# Patient Record
Sex: FEMALE | Race: WHITE | NOT HISPANIC OR LATINO | Employment: OTHER | URBAN - METROPOLITAN AREA
[De-identification: names, ages, dates, MRNs, and addresses within clinical notes are randomized per-mention and may not be internally consistent; named-entity substitution may affect disease eponyms.]

---

## 2017-01-10 ENCOUNTER — CONVERSION ENCOUNTER (OUTPATIENT)
Dept: FAMILY MEDICINE CLINIC | Facility: CLINIC | Age: 67
End: 2017-01-10

## 2017-01-10 LAB
ALBUMIN SERPL-MCNC: 4.1 G/DL (ref 3.6–5.1)
ALBUMIN/GLOB SERPL: ABNORMAL {RATIO} (ref 1–2.5)
ALP SERPL-CCNC: 93 UNITS/L (ref 33–130)
ALT SERPL-CCNC: 21 UNITS/L (ref 6–29)
AST SERPL-CCNC: 23 UNITS/L (ref 10–35)
BASOPHILS # BLD AUTO: ABNORMAL 10*3/MM3 (ref 0–200)
BASOPHILS NFR BLD AUTO: 0.5 %
BILIRUB SERPL-MCNC: 0.5 MG/DL (ref 0.2–1.2)
BUN SERPL-MCNC: 18 MG/DL (ref 7–25)
BUN/CREAT SERPL: ABNORMAL (ref 6–22)
CALCIUM SERPL-MCNC: 10.3 MG/DL (ref 8.6–10.4)
CHLORIDE SERPL-SCNC: 102 MMOL/L (ref 98–110)
CHOLEST SERPL-MCNC: 209 MG/DL (ref 125–200)
CHOLEST/HDLC SERPL: ABNORMAL {RATIO}
CO2 CONTENT VENOUS: 32 MMOL/L (ref 20–31)
CONV NEUTROPHILS/100 LEUKOCYTES IN BODY FLUID BY MANUAL COUNT: 67 %
CONV TOTAL PROTEIN: 7.3 G/DL (ref 6.1–8.1)
CREAT UR-MCNC: 0.72 MG/DL (ref 0.5–0.99)
EOSINOPHIL # BLD AUTO: 3 %
EOSINOPHIL # BLD AUTO: ABNORMAL 10*3/MM3 (ref 15–500)
ERYTHROCYTE [DISTWIDTH] IN BLOOD BY AUTOMATED COUNT: 13.6 % (ref 11–15)
GLOBULIN UR ELPH-MCNC: ABNORMAL G/DL (ref 1.9–3.7)
GLUCOSE SERPL-MCNC: 95 MG/DL (ref 65–99)
HCT VFR BLD AUTO: 43.2 % (ref 35–45)
HDLC SERPL-MCNC: 46 MG/DL
HGB BLD-MCNC: 14.4 G/DL (ref 11.7–15.5)
LDLC SERPL CALC-MCNC: ABNORMAL MG/DL
LYMPHOCYTES # BLD AUTO: ABNORMAL 10*3/MM3 (ref 850–3900)
LYMPHOCYTES NFR BLD AUTO: 24.8 %
MCH RBC QN AUTO: 31 PG (ref 27–33)
MCHC RBC AUTO-ENTMCNC: ABNORMAL % (ref 32–36)
MCV RBC AUTO: 93.3 FL (ref 80–100)
MONOCYTES # BLD AUTO: ABNORMAL 10*3/MICROLITER (ref 200–950)
MONOCYTES NFR BLD AUTO: 4.7 %
NEUTROPHILS # BLD AUTO: ABNORMAL 10*3/MM3 (ref 1500–7800)
PLATELET # BLD AUTO: ABNORMAL 10*3/MM3 (ref 140–400)
PMV BLD AUTO: 9.3 FL (ref 7.5–11.5)
POTASSIUM SERPL-SCNC: 4.2 MMOL/L (ref 3.5–5.3)
RBC # BLD AUTO: ABNORMAL 10*6/MM3 (ref 3.8–5.1)
SODIUM SERPL-SCNC: 142 MMOL/L (ref 135–146)
TRIGL SERPL-MCNC: 400 MG/DL
TSH SERPL-ACNC: 3.06 MIU/L (ref 0.4–4.5)
WBC # BLD AUTO: ABNORMAL K/UL (ref 3.8–10.8)

## 2017-06-02 LAB
CONV RF TITER: 10
CRP SERPL-MCNC: 0.89 MG/DL
DSDNA AB SER-ACNC: 4 [IU]/ML
ERYTHROCYTE [SEDIMENTATION RATE] IN BLOOD BY WESTERGREN METHOD: 14 MM/HR

## 2017-09-19 LAB
ALBUMIN SERPL-MCNC: 4.4 G/DL (ref 3.6–5.1)
ALBUMIN/GLOB SERPL: ABNORMAL {RATIO} (ref 1–2.5)
ALP SERPL-CCNC: 107 UNITS/L (ref 33–130)
ALT SERPL-CCNC: 21 UNITS/L (ref 6–29)
AST SERPL-CCNC: 26 UNITS/L (ref 10–35)
BASOPHILS # BLD AUTO: ABNORMAL 10*3/MM3 (ref 0–200)
BASOPHILS NFR BLD AUTO: 1.5 %
BILIRUB SERPL-MCNC: 0.8 MG/DL (ref 0.2–1.2)
BILIRUB UR QL STRIP: NEGATIVE
BUN SERPL-MCNC: 19 MG/DL (ref 7–25)
BUN/CREAT SERPL: ABNORMAL (ref 6–22)
C3 SERPL-MCNC: 221 MG/DL (ref 90–180)
C4 SERPL-MCNC: 29 MG/DL (ref 16–47)
CALCIUM SERPL-MCNC: 9.9 MG/DL (ref 8.6–10.4)
CHLORIDE SERPL-SCNC: 103 MMOL/L (ref 98–110)
CO2 CONTENT VENOUS: 32 MMOL/L (ref 20–31)
COLOR UR: ABNORMAL
CONV BACTERIA IN URINE MICRO: ABNORMAL /HPF
CONV HYALINE CASTS IN URINE MICRO: ABNORMAL
CONV NEUTROPHILS/100 LEUKOCYTES IN BODY FLUID BY MANUAL COUNT: 63.8 %
CONV PROTEIN IN URINE BY AUTOMATED TEST STRIP: NEGATIVE
CONV TOTAL PROTEIN: 7.6 G/DL (ref 6.1–8.1)
CREAT UR-MCNC: 0.77 MG/DL (ref 0.5–0.99)
CRP SERPL-MCNC: 1.02 MG/DL
EOSINOPHIL # BLD AUTO: 3.4 %
EOSINOPHIL # BLD AUTO: ABNORMAL 10*3/MM3 (ref 15–500)
ERYTHROCYTE [DISTWIDTH] IN BLOOD BY AUTOMATED COUNT: 13.2 % (ref 11–15)
ERYTHROCYTE [SEDIMENTATION RATE] IN BLOOD BY WESTERGREN METHOD: 6 MM/HR
GLOBULIN UR ELPH-MCNC: ABNORMAL G/DL (ref 1.9–3.7)
GLUCOSE SERPL-MCNC: 97 MG/DL (ref 65–99)
GLUCOSE UR QL: NEGATIVE G/DL
HCT VFR BLD AUTO: 42.2 % (ref 35–45)
HGB BLD-MCNC: 15 G/DL (ref 11.7–15.5)
HGB UR QL STRIP: NEGATIVE
KETONES UR QL STRIP: NEGATIVE
LYMPHOCYTES # BLD AUTO: ABNORMAL 10*3/MM3 (ref 850–3900)
LYMPHOCYTES NFR BLD AUTO: 25.2 %
MCH RBC QN AUTO: 32.5 PG (ref 27–33)
MCHC RBC AUTO-ENTMCNC: ABNORMAL % (ref 32–36)
MCV RBC AUTO: 91.5 FL (ref 80–100)
MONOCYTES # BLD AUTO: ABNORMAL 10*3/MICROLITER (ref 200–950)
MONOCYTES NFR BLD AUTO: 6.1 %
NEUTROPHILS # BLD AUTO: ABNORMAL 10*3/MM3 (ref 1500–7800)
PH UR STRIP.AUTO: 7.5 [PH] (ref 5–8)
PLATELET # BLD AUTO: ABNORMAL 10*3/MM3 (ref 140–400)
PMV BLD AUTO: 10.4 FL (ref 7.5–12.5)
POTASSIUM SERPL-SCNC: 4.3 MMOL/L (ref 3.5–5.3)
RBC # BLD AUTO: ABNORMAL 10*6/MM3 (ref 3.8–5.1)
RBC #/AREA URNS HPF: ABNORMAL /[HPF]
SODIUM SERPL-SCNC: 141 MMOL/L (ref 135–146)
SP GR UR: 1.02 (ref 1–1.03)
SQUAMOUS #/AREA URNS HPF: ABNORMAL /HPF
WBC # BLD AUTO: ABNORMAL K/UL (ref 3.8–10.8)
WBC #/AREA URNS HPF: ABNORMAL CELLS/HPF

## 2017-10-11 ENCOUNTER — HOSPITAL ENCOUNTER (OUTPATIENT)
Dept: RHEUMATOLOGY | Facility: CLINIC | Age: 67
Discharge: HOME | End: 2017-10-11
Attending: INTERNAL MEDICINE | Admitting: INTERNAL MEDICINE

## 2017-11-20 ENCOUNTER — HOSPITAL ENCOUNTER (OUTPATIENT)
Dept: LAB | Facility: HOSPITAL | Age: 67
Discharge: HOME | End: 2017-11-20
Attending: INTERNAL MEDICINE | Admitting: INTERNAL MEDICINE

## 2017-11-20 LAB
ALBUMIN SERPL-MCNC: 3.9 G/DL (ref 3.5–4.8)
ALBUMIN/GLOB SERPL: 1.3 {RATIO} (ref 1–1.7)
ALP SERPL-CCNC: 86 IU/L (ref 32–91)
ALT SERPL-CCNC: 27 IU/L (ref 14–54)
ANION GAP SERPL CALC-SCNC: 10.3 MMOL/L (ref 10–20)
AST SERPL-CCNC: 27 IU/L (ref 15–41)
BASOPHILS # BLD AUTO: 0 10*3/UL (ref 0–0.2)
BASOPHILS NFR BLD AUTO: 1 % (ref 0–2)
BILIRUB SERPL-MCNC: 1.1 MG/DL (ref 0.3–1.2)
BILIRUB UR QL STRIP: NEGATIVE MG/DL
BUN SERPL-MCNC: 16 MG/DL (ref 8–20)
BUN/CREAT SERPL: 22.9 (ref 5.4–26.2)
CALCIUM SERPL-MCNC: 9.2 MG/DL (ref 8.9–10.3)
CASTS URNS QL MICRO: ABNORMAL /[LPF]
CHLORIDE SERPL-SCNC: 102 MMOL/L (ref 101–111)
COLOR UR: YELLOW
CONV BACTERIA IN URINE MICRO: NEGATIVE
CONV CLARITY OF URINE: CLEAR
CONV CO2: 29 MMOL/L (ref 22–32)
CONV HIV-1/ HIV-2: NORMAL
CONV HIV-1/ HIV-2: NORMAL
CONV HYALINE CASTS IN URINE MICRO: 2 /[LPF] (ref 0–5)
CONV PROTEIN IN URINE BY AUTOMATED TEST STRIP: NEGATIVE MG/DL
CONV SMALL ROUND CELLS: ABNORMAL /[HPF]
CONV TOTAL PROTEIN: 6.8 G/DL (ref 6.1–7.9)
CONV UROBILINOGEN IN URINE BY AUTOMATED TEST STRIP: 0.2 MG/DL
CREAT UR-MCNC: 0.7 MG/DL (ref 0.4–1)
CRP SERPL-MCNC: 0.98 MG/DL (ref 0–0.7)
DIFFERENTIAL METHOD BLD: (no result)
EOSINOPHIL # BLD AUTO: 0.2 10*3/UL (ref 0–0.3)
EOSINOPHIL # BLD AUTO: 2 % (ref 0–3)
ERYTHROCYTE [DISTWIDTH] IN BLOOD BY AUTOMATED COUNT: 14.6 % (ref 11.5–14.5)
ERYTHROCYTE [SEDIMENTATION RATE] IN BLOOD BY WESTERGREN METHOD: 21 MM/HR (ref 0–30)
GLOBULIN UR ELPH-MCNC: 2.9 G/DL (ref 2.5–3.8)
GLUCOSE SERPL-MCNC: 103 MG/DL (ref 65–99)
GLUCOSE UR QL: NEGATIVE MG/DL
HAV IGM SERPL QL IA: NONREACTIVE
HBV CORE IGM SERPL QL IA: NONREACTIVE
HBV SURFACE AG SERPL QL IA: NONREACTIVE
HCT VFR BLD AUTO: 41.8 % (ref 35–49)
HCV AB SER DONR QL: NORMAL
HCV AB SER DONR QL: NORMAL
HGB BLD-MCNC: 14.2 G/DL (ref 12–15)
HGB UR QL STRIP: NEGATIVE
KETONES UR QL STRIP: NEGATIVE MG/DL
LEUKOCYTE ESTERASE UR QL STRIP: ABNORMAL
LYMPHOCYTES # BLD AUTO: 1.4 10*3/UL (ref 0.8–4.8)
LYMPHOCYTES NFR BLD AUTO: 17 % (ref 18–42)
MCH RBC QN AUTO: 31.8 PG (ref 26–32)
MCHC RBC AUTO-ENTMCNC: 34 G/DL (ref 32–36)
MCV RBC AUTO: 93.4 FL (ref 80–94)
MONOCYTES # BLD AUTO: 0.6 10*3/UL (ref 0.1–1.3)
MONOCYTES NFR BLD AUTO: 7 % (ref 2–11)
NEUTROPHILS # BLD AUTO: 6.1 10*3/UL (ref 2.3–8.6)
NEUTROPHILS NFR BLD AUTO: 73 % (ref 50–75)
NITRITE UR QL STRIP: NEGATIVE
NRBC BLD AUTO-RTO: 0 /100{WBCS}
NRBC/RBC NFR BLD MANUAL: 0 10*3/UL
PH UR STRIP.AUTO: 6.5 [PH] (ref 4.5–8)
PLATELET # BLD AUTO: 185 10*3/UL (ref 150–450)
PMV BLD AUTO: 7.7 FL (ref 7.4–10.4)
POTASSIUM SERPL-SCNC: 3.3 MMOL/L (ref 3.6–5.1)
PTH-INTACT SERPL-MCNC: 92 PG/ML (ref 11–72)
RBC # BLD AUTO: 4.48 10*6/UL (ref 4–5.4)
RBC #/AREA URNS HPF: 1 /[HPF] (ref 0–3)
SODIUM SERPL-SCNC: 138 MMOL/L (ref 136–144)
SP GR UR: 1.01 (ref 1–1.03)
SPERM URNS QL MICRO: ABNORMAL /[HPF]
SQUAMOUS SPT QL MICRO: 1 /[HPF] (ref 0–5)
UNIDENT CRYS URNS QL MICRO: ABNORMAL /[HPF]
WBC # BLD AUTO: 8.3 10*3/UL (ref 4.5–11.5)
WBC #/AREA URNS HPF: 2 /[HPF] (ref 0–5)
YEAST SPEC QL WET PREP: ABNORMAL /[HPF]

## 2017-11-20 PROCEDURE — 86481 TB AG RESPONSE T-CELL SUSP: CPT

## 2017-11-21 ENCOUNTER — LAB REQUISITION (OUTPATIENT)
Dept: LAB | Facility: HOSPITAL | Age: 67
End: 2017-11-21

## 2017-11-21 DIAGNOSIS — Z00.00 ROUTINE GENERAL MEDICAL EXAMINATION AT A HEALTH CARE FACILITY: ICD-10-CM

## 2017-11-22 LAB
TSPOT INTERPRETATION: NEGATIVE
TSPOT NIL CONTROL: 0
TSPOT PANEL A: 0
TSPOT PANEL B: 0
TSPOT POS CONTROL: 51

## 2017-11-24 LAB — TSPOT INTERPRETATION: NORMAL

## 2017-12-20 LAB
BUN SERPL-MCNC: 12 MG/DL (ref 7–25)
BUN/CREAT SERPL: NORMAL (ref 6–22)
CALCIUM SERPL-MCNC: 9.7 MG/DL (ref 8.6–10.2)
CHLORIDE SERPL-SCNC: 104 MMOL/L (ref 98–110)
CONV CO2: 29 MMOL/L (ref 21–33)
CREAT UR-MCNC: 0.8 MG/DL (ref 0.6–1.18)
GLUCOSE SERPL-MCNC: 92 MG/DL (ref 65–99)
MAGNESIUM SERPL-MCNC: 2.2 MG/DL (ref 1.5–2.5)
POTASSIUM SERPL-SCNC: 3.5 MMOL/L (ref 3.5–5.3)
SODIUM SERPL-SCNC: 141 MMOL/L (ref 135–146)
T4 FREE SERPL-MCNC: 1.2 NG/DL (ref 0.8–1.8)
TSH SERPL-ACNC: 1.23 MICROINTL UNITS/ML (ref 0.4–4.5)

## 2018-01-19 ENCOUNTER — HOSPITAL ENCOUNTER (OUTPATIENT)
Dept: LAB | Facility: HOSPITAL | Age: 68
Discharge: HOME | End: 2018-01-19
Attending: INTERNAL MEDICINE | Admitting: INTERNAL MEDICINE

## 2018-01-19 LAB
ALBUMIN SERPL-MCNC: 4.1 G/DL (ref 3.5–4.8)
ALBUMIN/GLOB SERPL: 1.3 {RATIO} (ref 1–1.7)
ALP SERPL-CCNC: 80 IU/L (ref 32–91)
ALT SERPL-CCNC: 25 IU/L (ref 14–54)
ANION GAP SERPL CALC-SCNC: 15.1 MMOL/L (ref 10–20)
AST SERPL-CCNC: 31 IU/L (ref 15–41)
BASOPHILS # BLD AUTO: 0.1 10*3/UL (ref 0–0.2)
BASOPHILS NFR BLD AUTO: 1 % (ref 0–2)
BILIRUB SERPL-MCNC: 0.7 MG/DL (ref 0.3–1.2)
BILIRUB UR QL STRIP: NEGATIVE MG/DL
BUN SERPL-MCNC: 14 MG/DL (ref 8–20)
BUN/CREAT SERPL: 20 (ref 5.4–26.2)
CALCIUM SERPL-MCNC: 9.6 MG/DL (ref 8.9–10.3)
CASTS URNS QL MICRO: NORMAL /[LPF]
CHLORIDE SERPL-SCNC: 104 MMOL/L (ref 101–111)
COLOR UR: YELLOW
CONV BACTERIA IN URINE MICRO: NEGATIVE
CONV CLARITY OF URINE: NORMAL
CONV CO2: 24 MMOL/L (ref 22–32)
CONV HYALINE CASTS IN URINE MICRO: NORMAL /[LPF] (ref 0–5)
CONV PROTEIN IN URINE BY AUTOMATED TEST STRIP: NEGATIVE MG/DL
CONV SMALL ROUND CELLS: NORMAL /[HPF]
CONV TOTAL PROTEIN: 7.2 G/DL (ref 6.1–7.9)
CONV UROBILINOGEN IN URINE BY AUTOMATED TEST STRIP: 0.2 MG/DL
CREAT UR-MCNC: 0.7 MG/DL (ref 0.4–1)
CRP SERPL-MCNC: 0.62 MG/DL (ref 0–0.7)
CULTURE INDICATED?: NORMAL
DIFFERENTIAL METHOD BLD: (no result)
EOSINOPHIL # BLD AUTO: 0.3 10*3/UL (ref 0–0.3)
EOSINOPHIL # BLD AUTO: 5 % (ref 0–3)
ERYTHROCYTE [DISTWIDTH] IN BLOOD BY AUTOMATED COUNT: 15.2 % (ref 11.5–14.5)
ERYTHROCYTE [SEDIMENTATION RATE] IN BLOOD BY WESTERGREN METHOD: 26 MM/HR (ref 0–30)
GLOBULIN UR ELPH-MCNC: 3.1 G/DL (ref 2.5–3.8)
GLUCOSE SERPL-MCNC: 96 MG/DL (ref 65–99)
GLUCOSE UR QL: NEGATIVE MG/DL
HCT VFR BLD AUTO: 40.2 % (ref 35–49)
HGB BLD-MCNC: 13.8 G/DL (ref 12–15)
HGB UR QL STRIP: NEGATIVE
KETONES UR QL STRIP: NEGATIVE MG/DL
LEUKOCYTE ESTERASE UR QL STRIP: NEGATIVE
LYMPHOCYTES # BLD AUTO: 1.3 10*3/UL (ref 0.8–4.8)
LYMPHOCYTES NFR BLD AUTO: 23 % (ref 18–42)
MCH RBC QN AUTO: 32.7 PG (ref 26–32)
MCHC RBC AUTO-ENTMCNC: 34.3 G/DL (ref 32–36)
MCV RBC AUTO: 95.4 FL (ref 80–94)
MONOCYTES # BLD AUTO: 0.3 10*3/UL (ref 0.1–1.3)
MONOCYTES NFR BLD AUTO: 5 % (ref 2–11)
NEUTROPHILS # BLD AUTO: 3.9 10*3/UL (ref 2.3–8.6)
NEUTROPHILS NFR BLD AUTO: 66 % (ref 50–75)
NITRITE UR QL STRIP: NEGATIVE
NRBC BLD AUTO-RTO: 0 /100{WBCS}
NRBC/RBC NFR BLD MANUAL: 0 10*3/UL
PH UR STRIP.AUTO: 6 [PH] (ref 4.5–8)
PLATELET # BLD AUTO: 220 10*3/UL (ref 150–450)
PMV BLD AUTO: 8.4 FL (ref 7.4–10.4)
POTASSIUM SERPL-SCNC: 4.1 MMOL/L (ref 3.6–5.1)
RBC # BLD AUTO: 4.22 10*6/UL (ref 4–5.4)
RBC #/AREA URNS HPF: 1 /[HPF] (ref 0–3)
SODIUM SERPL-SCNC: 139 MMOL/L (ref 136–144)
SP GR UR: 1.02 (ref 1–1.03)
SPERM URNS QL MICRO: NORMAL /[HPF]
SQUAMOUS SPT QL MICRO: 2 /[HPF] (ref 0–5)
UNIDENT CRYS URNS QL MICRO: NORMAL /[HPF]
WBC # BLD AUTO: 5.8 10*3/UL (ref 4.5–11.5)
WBC #/AREA URNS HPF: 1 /[HPF] (ref 0–5)
YEAST SPEC QL WET PREP: NORMAL /[HPF]

## 2018-01-24 LAB
ANTI-CARDIOLIPIN IGG ANTIBODY: <14 GPL
INR PPP: 1
PTT LA MIX: 34 SEC
SCREEN DRVVT: 39 SEC

## 2018-04-05 ENCOUNTER — HOSPITAL ENCOUNTER (OUTPATIENT)
Dept: LAB | Facility: HOSPITAL | Age: 68
Discharge: HOME | End: 2018-04-05
Attending: INTERNAL MEDICINE | Admitting: INTERNAL MEDICINE

## 2018-04-05 LAB
ALBUMIN SERPL-MCNC: 4 G/DL (ref 3.5–4.8)
ALBUMIN/GLOB SERPL: 1.4 {RATIO} (ref 1–1.7)
ALP SERPL-CCNC: 87 IU/L (ref 32–91)
ALT SERPL-CCNC: 31 IU/L (ref 14–54)
ANION GAP SERPL CALC-SCNC: 10.1 MMOL/L (ref 10–20)
AST SERPL-CCNC: 32 IU/L (ref 15–41)
BASOPHILS # BLD AUTO: 0 10*3/UL (ref 0–0.2)
BASOPHILS NFR BLD AUTO: 1 % (ref 0–2)
BILIRUB SERPL-MCNC: 0.3 MG/DL (ref 0.3–1.2)
BILIRUB UR QL STRIP: NEGATIVE MG/DL
BUN SERPL-MCNC: 13 MG/DL (ref 8–20)
BUN/CREAT SERPL: 16.3 (ref 5.4–26.2)
CALCIUM SERPL-MCNC: 9.3 MG/DL (ref 8.9–10.3)
CASTS URNS QL MICRO: ABNORMAL /[LPF]
CHLORIDE SERPL-SCNC: 103 MMOL/L (ref 101–111)
COLOR UR: YELLOW
CONV BACTERIA IN URINE MICRO: NEGATIVE
CONV CLARITY OF URINE: CLEAR
CONV CO2: 29 MMOL/L (ref 22–32)
CONV HYALINE CASTS IN URINE MICRO: 2 /[LPF] (ref 0–5)
CONV PROTEIN IN URINE BY AUTOMATED TEST STRIP: NEGATIVE MG/DL
CONV SMALL ROUND CELLS: ABNORMAL /[HPF]
CONV TOTAL PROTEIN: 6.8 G/DL (ref 6.1–7.9)
CONV UROBILINOGEN IN URINE BY AUTOMATED TEST STRIP: 0.2 MG/DL
CREAT UR-MCNC: 0.8 MG/DL (ref 0.4–1)
CRP SERPL-MCNC: 0.79 MG/DL (ref 0–0.7)
CULTURE INDICATED?: ABNORMAL
DIFFERENTIAL METHOD BLD: (no result)
EOSINOPHIL # BLD AUTO: 0.2 10*3/UL (ref 0–0.3)
EOSINOPHIL # BLD AUTO: 5 % (ref 0–3)
ERYTHROCYTE [DISTWIDTH] IN BLOOD BY AUTOMATED COUNT: 14.6 % (ref 11.5–14.5)
ERYTHROCYTE [SEDIMENTATION RATE] IN BLOOD BY WESTERGREN METHOD: 20 MM/HR (ref 0–30)
GLOBULIN UR ELPH-MCNC: 2.8 G/DL (ref 2.5–3.8)
GLUCOSE SERPL-MCNC: 103 MG/DL (ref 65–99)
GLUCOSE UR QL: NEGATIVE MG/DL
HCT VFR BLD AUTO: 39.8 % (ref 35–49)
HGB BLD-MCNC: 13.3 G/DL (ref 12–15)
HGB UR QL STRIP: NEGATIVE
KETONES UR QL STRIP: NEGATIVE MG/DL
LEUKOCYTE ESTERASE UR QL STRIP: ABNORMAL
LYMPHOCYTES # BLD AUTO: 1.5 10*3/UL (ref 0.8–4.8)
LYMPHOCYTES NFR BLD AUTO: 28 % (ref 18–42)
MCH RBC QN AUTO: 32.4 PG (ref 26–32)
MCHC RBC AUTO-ENTMCNC: 33.5 G/DL (ref 32–36)
MCV RBC AUTO: 96.8 FL (ref 80–94)
MONOCYTES # BLD AUTO: 0.4 10*3/UL (ref 0.1–1.3)
MONOCYTES NFR BLD AUTO: 8 % (ref 2–11)
NEUTROPHILS # BLD AUTO: 3 10*3/UL (ref 2.3–8.6)
NEUTROPHILS NFR BLD AUTO: 58 % (ref 50–75)
NITRITE UR QL STRIP: NEGATIVE
NRBC BLD AUTO-RTO: 0 /100{WBCS}
NRBC/RBC NFR BLD MANUAL: 0 10*3/UL
PH UR STRIP.AUTO: 6.5 [PH] (ref 4.5–8)
PLATELET # BLD AUTO: 196 10*3/UL (ref 150–450)
PMV BLD AUTO: 8.4 FL (ref 7.4–10.4)
POTASSIUM SERPL-SCNC: 4.1 MMOL/L (ref 3.6–5.1)
RBC # BLD AUTO: 4.12 10*6/UL (ref 4–5.4)
RBC #/AREA URNS HPF: 1 /[HPF] (ref 0–3)
SODIUM SERPL-SCNC: 138 MMOL/L (ref 136–144)
SP GR UR: 1.01 (ref 1–1.03)
SPERM URNS QL MICRO: ABNORMAL /[HPF]
SQUAMOUS SPT QL MICRO: 2 /[HPF] (ref 0–5)
UNIDENT CRYS URNS QL MICRO: ABNORMAL /[HPF]
WBC # BLD AUTO: 5.2 10*3/UL (ref 4.5–11.5)
WBC #/AREA URNS HPF: 5 /[HPF] (ref 0–5)
YEAST SPEC QL WET PREP: ABNORMAL /[HPF]

## 2018-06-07 ENCOUNTER — HOSPITAL ENCOUNTER (OUTPATIENT)
Dept: FAMILY MEDICINE CLINIC | Facility: CLINIC | Age: 68
Discharge: HOME | End: 2018-06-07
Attending: NURSE PRACTITIONER | Admitting: NURSE PRACTITIONER

## 2018-06-11 ENCOUNTER — HOSPITAL ENCOUNTER (OUTPATIENT)
Dept: CARDIOLOGY | Facility: HOSPITAL | Age: 68
Discharge: HOME | End: 2018-06-11

## 2018-07-09 ENCOUNTER — HOSPITAL ENCOUNTER (OUTPATIENT)
Dept: LAB | Facility: HOSPITAL | Age: 68
Discharge: HOME | End: 2018-07-09
Attending: INTERNAL MEDICINE | Admitting: INTERNAL MEDICINE

## 2018-07-09 LAB
ALBUMIN SERPL-MCNC: 4.2 G/DL (ref 3.5–4.8)
ALBUMIN/GLOB SERPL: 1.2 {RATIO} (ref 1–1.7)
ALP SERPL-CCNC: 92 IU/L (ref 32–91)
ALT SERPL-CCNC: 39 IU/L (ref 14–54)
ANION GAP SERPL CALC-SCNC: 9.6 MMOL/L (ref 10–20)
AST SERPL-CCNC: 41 IU/L (ref 15–41)
BASOPHILS # BLD AUTO: 0.1 10*3/UL (ref 0–0.2)
BASOPHILS NFR BLD AUTO: 1 % (ref 0–2)
BILIRUB SERPL-MCNC: 1 MG/DL (ref 0.3–1.2)
BILIRUB UR QL STRIP: NEGATIVE MG/DL
BUN SERPL-MCNC: 16 MG/DL (ref 8–20)
BUN/CREAT SERPL: 20 (ref 5.4–26.2)
CALCIUM SERPL-MCNC: 10.2 MG/DL (ref 8.9–10.3)
CASTS URNS QL MICRO: ABNORMAL /[LPF]
CHLORIDE SERPL-SCNC: 101 MMOL/L (ref 101–111)
COLOR UR: YELLOW
CONV BACTERIA IN URINE MICRO: NEGATIVE
CONV CLARITY OF URINE: CLEAR
CONV CO2: 32 MMOL/L (ref 22–32)
CONV HYALINE CASTS IN URINE MICRO: ABNORMAL /[LPF] (ref 0–5)
CONV PROTEIN IN URINE BY AUTOMATED TEST STRIP: NEGATIVE MG/DL
CONV SMALL ROUND CELLS: ABNORMAL /[HPF]
CONV TOTAL PROTEIN: 7.6 G/DL (ref 6.1–7.9)
CONV UROBILINOGEN IN URINE BY AUTOMATED TEST STRIP: 0.2 MG/DL
CREAT UR-MCNC: 0.8 MG/DL (ref 0.4–1)
CRP SERPL-MCNC: 1.39 MG/DL (ref 0–0.7)
CULTURE INDICATED?: ABNORMAL
DIFFERENTIAL METHOD BLD: (no result)
EOSINOPHIL # BLD AUTO: 0.3 10*3/UL (ref 0–0.3)
EOSINOPHIL # BLD AUTO: 4 % (ref 0–3)
ERYTHROCYTE [DISTWIDTH] IN BLOOD BY AUTOMATED COUNT: 15.3 % (ref 11.5–14.5)
ERYTHROCYTE [SEDIMENTATION RATE] IN BLOOD BY WESTERGREN METHOD: 24 MM/HR (ref 0–30)
GLOBULIN UR ELPH-MCNC: 3.4 G/DL (ref 2.5–3.8)
GLUCOSE SERPL-MCNC: 106 MG/DL (ref 65–99)
GLUCOSE UR QL: NEGATIVE MG/DL
HCT VFR BLD AUTO: 44.2 % (ref 35–49)
HGB BLD-MCNC: 14.8 G/DL (ref 12–15)
HGB UR QL STRIP: NEGATIVE
KETONES UR QL STRIP: NEGATIVE MG/DL
LEUKOCYTE ESTERASE UR QL STRIP: ABNORMAL
LYMPHOCYTES # BLD AUTO: 1.1 10*3/UL (ref 0.8–4.8)
LYMPHOCYTES NFR BLD AUTO: 17 % (ref 18–42)
MCH RBC QN AUTO: 32.5 PG (ref 26–32)
MCHC RBC AUTO-ENTMCNC: 33.5 G/DL (ref 32–36)
MCV RBC AUTO: 96.9 FL (ref 80–94)
MONOCYTES # BLD AUTO: 0.6 10*3/UL (ref 0.1–1.3)
MONOCYTES NFR BLD AUTO: 10 % (ref 2–11)
NEUTROPHILS # BLD AUTO: 4.2 10*3/UL (ref 2.3–8.6)
NEUTROPHILS NFR BLD AUTO: 68 % (ref 50–75)
NITRITE UR QL STRIP: NEGATIVE
NRBC BLD AUTO-RTO: 0 /100{WBCS}
NRBC/RBC NFR BLD MANUAL: 0 10*3/UL
PH UR STRIP.AUTO: 6 [PH] (ref 4.5–8)
PLATELET # BLD AUTO: 233 10*3/UL (ref 150–450)
PMV BLD AUTO: 8 FL (ref 7.4–10.4)
POTASSIUM SERPL-SCNC: 3.6 MMOL/L (ref 3.6–5.1)
RBC # BLD AUTO: 4.56 10*6/UL (ref 4–5.4)
RBC #/AREA URNS HPF: 1 /[HPF] (ref 0–3)
SODIUM SERPL-SCNC: 139 MMOL/L (ref 136–144)
SP GR UR: 1.01 (ref 1–1.03)
SPERM URNS QL MICRO: ABNORMAL /[HPF]
SQUAMOUS SPT QL MICRO: 5 /[HPF] (ref 0–5)
UNIDENT CRYS URNS QL MICRO: ABNORMAL /[HPF]
WBC # BLD AUTO: 6.3 10*3/UL (ref 4.5–11.5)
WBC #/AREA URNS HPF: 10 /[HPF] (ref 0–5)
YEAST SPEC QL WET PREP: ABNORMAL /[HPF]

## 2018-08-10 ENCOUNTER — HOSPITAL ENCOUNTER (OUTPATIENT)
Dept: FAMILY MEDICINE CLINIC | Facility: CLINIC | Age: 68
Discharge: HOME | End: 2018-08-10
Attending: NURSE PRACTITIONER | Admitting: NURSE PRACTITIONER

## 2018-08-10 LAB
ALBUMIN SERPL-MCNC: 3.9 G/DL (ref 3.6–5.1)
ALBUMIN/GLOB SERPL: ABNORMAL {RATIO} (ref 1–2.5)
ALP SERPL-CCNC: 100 UNITS/L (ref 33–130)
ALT SERPL-CCNC: 14 UNITS/L (ref 6–29)
AST SERPL-CCNC: 20 UNITS/L (ref 10–35)
BASOPHILS # BLD AUTO: ABNORMAL 10*3/MM3 (ref 0–200)
BASOPHILS NFR BLD AUTO: 1 %
BILIRUB SERPL-MCNC: 1 MG/DL (ref 0.2–1.2)
BUN SERPL-MCNC: 14 MG/DL (ref 7–25)
BUN/CREAT SERPL: ABNORMAL (ref 6–22)
CALCIUM SERPL-MCNC: 9.4 MG/DL (ref 8.6–10.4)
CHLORIDE SERPL-SCNC: 102 MMOL/L (ref 98–110)
CO2 CONTENT VENOUS: 28 MMOL/L (ref 20–32)
CONV NEUTROPHILS/100 LEUKOCYTES IN BODY FLUID BY MANUAL COUNT: 73.6 %
CONV TOTAL PROTEIN: 7 G/DL (ref 6.1–8.1)
CREAT UR-MCNC: 1.13 MG/DL (ref 0.5–0.99)
EOSINOPHIL # BLD AUTO: 2.6 %
EOSINOPHIL # BLD AUTO: ABNORMAL 10*3/MM3 (ref 15–500)
ERYTHROCYTE [DISTWIDTH] IN BLOOD BY AUTOMATED COUNT: 13.8 % (ref 11–15)
GLOBULIN UR ELPH-MCNC: ABNORMAL G/DL (ref 1.9–3.7)
GLUCOSE SERPL-MCNC: 91 MG/DL (ref 65–99)
HCT VFR BLD AUTO: 34.6 % (ref 35–45)
HGB BLD-MCNC: 11.6 G/DL (ref 11.7–15.5)
LYMPHOCYTES # BLD AUTO: ABNORMAL 10*3/MM3 (ref 850–3900)
LYMPHOCYTES NFR BLD AUTO: 15.8 %
MCH RBC QN AUTO: 32.1 PG (ref 27–33)
MCHC RBC AUTO-ENTMCNC: ABNORMAL % (ref 32–36)
MCV RBC AUTO: 95.8 FL (ref 80–100)
MONOCYTES # BLD AUTO: ABNORMAL 10*3/MICROLITER (ref 200–950)
MONOCYTES NFR BLD AUTO: 7 %
NEUTROPHILS # BLD AUTO: ABNORMAL 10*3/MM3 (ref 1500–7800)
PLATELET # BLD AUTO: ABNORMAL 10*3/MM3 (ref 140–400)
PMV BLD AUTO: 9.9 FL (ref 7.5–12.5)
POTASSIUM SERPL-SCNC: 4.1 MMOL/L (ref 3.5–5.3)
RBC # BLD AUTO: ABNORMAL 10*6/MM3 (ref 3.8–5.1)
SODIUM SERPL-SCNC: 140 MMOL/L (ref 135–146)
TSH SERPL-ACNC: 1.33 MIU/L (ref 0.4–4.5)
WBC # BLD AUTO: ABNORMAL K/UL (ref 3.8–10.8)

## 2018-11-23 ENCOUNTER — HOSPITAL ENCOUNTER (OUTPATIENT)
Dept: LAB | Facility: HOSPITAL | Age: 68
Discharge: HOME | End: 2018-11-23
Attending: NURSE PRACTITIONER | Admitting: NURSE PRACTITIONER

## 2018-11-23 LAB
ALBUMIN SERPL-MCNC: 4 G/DL (ref 3.5–4.8)
ALBUMIN/GLOB SERPL: 1.3 {RATIO} (ref 1–1.7)
ALP SERPL-CCNC: 82 IU/L (ref 32–91)
ALT SERPL-CCNC: 31 IU/L (ref 14–54)
ANION GAP SERPL CALC-SCNC: 12 MMOL/L (ref 10–20)
AST SERPL-CCNC: 35 IU/L (ref 15–41)
BASOPHILS # BLD AUTO: 0 10*3/UL (ref 0–0.2)
BASOPHILS NFR BLD AUTO: 0 % (ref 0–2)
BILIRUB SERPL-MCNC: 0.8 MG/DL (ref 0.3–1.2)
BUN SERPL-MCNC: 10 MG/DL (ref 8–20)
BUN/CREAT SERPL: 12.5 (ref 5.4–26.2)
CALCIUM SERPL-MCNC: 9.4 MG/DL (ref 8.9–10.3)
CHLORIDE SERPL-SCNC: 106 MMOL/L (ref 101–111)
CONV CO2: 28 MMOL/L (ref 22–32)
CONV TOTAL PROTEIN: 7.2 G/DL (ref 6.1–7.9)
CREAT UR-MCNC: 0.8 MG/DL (ref 0.4–1)
CRP SERPL-MCNC: 0.28 MG/DL (ref 0–0.7)
DIFFERENTIAL METHOD BLD: (no result)
EOSINOPHIL # BLD AUTO: 0.2 10*3/UL (ref 0–0.3)
EOSINOPHIL # BLD AUTO: 3 % (ref 0–3)
ERYTHROCYTE [DISTWIDTH] IN BLOOD BY AUTOMATED COUNT: 15.8 % (ref 11.5–14.5)
ERYTHROCYTE [SEDIMENTATION RATE] IN BLOOD BY WESTERGREN METHOD: 26 MM/HR (ref 0–30)
GLOBULIN UR ELPH-MCNC: 3.2 G/DL (ref 2.5–3.8)
GLUCOSE SERPL-MCNC: 95 MG/DL (ref 65–99)
HCT VFR BLD AUTO: 40.7 % (ref 35–49)
HGB BLD-MCNC: 13.5 G/DL (ref 12–15)
LYMPHOCYTES # BLD AUTO: 2.1 10*3/UL (ref 0.8–4.8)
LYMPHOCYTES NFR BLD AUTO: 34 % (ref 18–42)
MAGNESIUM SERPL-MCNC: 2.1 MG/DL (ref 1.8–2.5)
MCH RBC QN AUTO: 30 PG (ref 26–32)
MCHC RBC AUTO-ENTMCNC: 33.1 G/DL (ref 32–36)
MCV RBC AUTO: 90.5 FL (ref 80–94)
MONOCYTES # BLD AUTO: 0.4 10*3/UL (ref 0.1–1.3)
MONOCYTES NFR BLD AUTO: 6 % (ref 2–11)
NEUTROPHILS # BLD AUTO: 3.5 10*3/UL (ref 2.3–8.6)
NEUTROPHILS NFR BLD AUTO: 57 % (ref 50–75)
NRBC BLD AUTO-RTO: 0 /100{WBCS}
NRBC/RBC NFR BLD MANUAL: 0 10*3/UL
PLATELET # BLD AUTO: 249 10*3/UL (ref 150–450)
PMV BLD AUTO: 8.1 FL (ref 7.4–10.4)
POTASSIUM SERPL-SCNC: 4 MMOL/L (ref 3.6–5.1)
RBC # BLD AUTO: 4.5 10*6/UL (ref 4–5.4)
SODIUM SERPL-SCNC: 142 MMOL/L (ref 136–144)
URATE SERPL-MCNC: 4.7 MG/DL (ref 2.6–8)
WBC # BLD AUTO: 6.1 10*3/UL (ref 4.5–11.5)

## 2018-11-27 ENCOUNTER — HOSPITAL ENCOUNTER (OUTPATIENT)
Dept: LAB | Facility: HOSPITAL | Age: 68
Discharge: HOME | End: 2018-11-27
Attending: NURSE PRACTITIONER | Admitting: NURSE PRACTITIONER

## 2018-11-27 PROCEDURE — 86481 TB AG RESPONSE T-CELL SUSP: CPT

## 2018-11-28 ENCOUNTER — LAB REQUISITION (OUTPATIENT)
Dept: LAB | Facility: HOSPITAL | Age: 68
End: 2018-11-28

## 2018-11-28 DIAGNOSIS — Z00.00 ROUTINE GENERAL MEDICAL EXAMINATION AT A HEALTH CARE FACILITY: ICD-10-CM

## 2018-11-28 LAB
HAV IGM SERPL QL IA: NONREACTIVE
HBV CORE IGM SERPL QL IA: NONREACTIVE
HBV SURFACE AG SERPL QL IA: NONREACTIVE
HCV AB SER DONR QL: NORMAL
HCV AB SER DONR QL: NORMAL

## 2018-11-29 LAB
TSPOT INTERPRETATION: NEGATIVE
TSPOT INTERPRETATION: NORMAL
TSPOT NIL CONTROL INTERPRETATION: NORMAL
TSPOT PANEL A: 1
TSPOT PANEL B: 0
TSPOT POS CONTROL INTERPRETATION: NORMAL

## 2019-04-29 LAB
ALBUMIN SERPL-MCNC: 4.2 G/DL (ref 3.6–5.1)
ALBUMIN/GLOB SERPL: NORMAL {RATIO} (ref 1–2.5)
ALP SERPL-CCNC: 94 UNITS/L (ref 33–130)
ALT SERPL-CCNC: 25 UNITS/L (ref 6–29)
AST SERPL-CCNC: 26 UNITS/L (ref 10–35)
BILIRUB SERPL-MCNC: 0.5 MG/DL (ref 0.2–1.2)
BUN SERPL-MCNC: 15 MG/DL (ref 7–25)
BUN/CREAT SERPL: NORMAL (ref 6–22)
CALCIUM SERPL-MCNC: 10.4 MG/DL (ref 8.6–10.4)
CHLORIDE SERPL-SCNC: 102 MMOL/L (ref 98–110)
CO2 CONTENT VENOUS: 32 MMOL/L (ref 20–32)
CONV TOTAL PROTEIN: 7.2 G/DL (ref 6.1–8.1)
CREAT UR-MCNC: 0.73 MG/DL (ref 0.6–0.93)
GLOBULIN UR ELPH-MCNC: NORMAL G/DL (ref 1.9–3.7)
GLUCOSE SERPL-MCNC: 96 MG/DL (ref 65–99)
POTASSIUM SERPL-SCNC: 4.1 MMOL/L (ref 3.5–5.3)
SODIUM SERPL-SCNC: 141 MMOL/L (ref 135–146)
TSH SERPL-ACNC: 3.91 MIU/L (ref 0.4–4.5)

## 2019-06-03 VITALS
HEART RATE: 65 BPM | WEIGHT: 166.2 LBS | BODY MASS INDEX: 28.38 KG/M2 | DIASTOLIC BLOOD PRESSURE: 85 MMHG | OXYGEN SATURATION: 97 % | HEIGHT: 64 IN | SYSTOLIC BLOOD PRESSURE: 139 MMHG

## 2019-07-24 ENCOUNTER — TELEPHONE (OUTPATIENT)
Dept: FAMILY MEDICINE CLINIC | Facility: CLINIC | Age: 69
End: 2019-07-24

## 2019-08-12 ENCOUNTER — OFFICE VISIT (OUTPATIENT)
Dept: FAMILY MEDICINE CLINIC | Facility: CLINIC | Age: 69
End: 2019-08-12

## 2019-08-12 VITALS
WEIGHT: 167 LBS | SYSTOLIC BLOOD PRESSURE: 123 MMHG | HEART RATE: 73 BPM | HEIGHT: 64 IN | OXYGEN SATURATION: 93 % | BODY MASS INDEX: 28.51 KG/M2 | TEMPERATURE: 98.1 F | DIASTOLIC BLOOD PRESSURE: 78 MMHG

## 2019-08-12 DIAGNOSIS — Z00.00 PREVENTATIVE HEALTH CARE: ICD-10-CM

## 2019-08-12 DIAGNOSIS — Z12.39 BREAST CANCER SCREENING: ICD-10-CM

## 2019-08-12 DIAGNOSIS — Z78.0 POSTMENOPAUSE: ICD-10-CM

## 2019-08-12 DIAGNOSIS — R25.2 LEG CRAMPS: ICD-10-CM

## 2019-08-12 DIAGNOSIS — E78.2 MIXED HYPERLIPIDEMIA: Primary | ICD-10-CM

## 2019-08-12 DIAGNOSIS — Z13.820 ENCOUNTER FOR SCREENING FOR OSTEOPOROSIS: ICD-10-CM

## 2019-08-12 DIAGNOSIS — Z12.31 SCREENING MAMMOGRAM, ENCOUNTER FOR: ICD-10-CM

## 2019-08-12 DIAGNOSIS — E03.9 ACQUIRED HYPOTHYROIDISM: ICD-10-CM

## 2019-08-12 DIAGNOSIS — E55.9 HYPOVITAMINOSIS D: ICD-10-CM

## 2019-08-12 PROBLEM — M81.0 OSTEOPOROSIS: Status: ACTIVE | Noted: 2017-10-11

## 2019-08-12 PROBLEM — E87.6 HYPOKALEMIA: Status: ACTIVE | Noted: 2017-11-28

## 2019-08-12 PROBLEM — R76.8 ELEVATED ANTINUCLEAR ANTIBODY (ANA) LEVEL: Status: ACTIVE | Noted: 2017-09-18

## 2019-08-12 PROBLEM — F41.8 ANXIETY ASSOCIATED WITH DEPRESSION: Status: ACTIVE | Noted: 2018-05-21

## 2019-08-12 PROBLEM — Z79.899 OTHER LONG TERM (CURRENT) DRUG THERAPY: Status: ACTIVE | Noted: 2018-04-12

## 2019-08-12 PROBLEM — J30.9 ALLERGIC RHINITIS: Status: ACTIVE | Noted: 2019-01-28

## 2019-08-12 PROBLEM — Z23 ENCOUNTER FOR IMMUNIZATION: Status: ACTIVE | Noted: 2017-10-11

## 2019-08-12 PROBLEM — I10 HYPERTENSION: Status: ACTIVE | Noted: 2019-08-12

## 2019-08-12 PROBLEM — M54.50 LOW BACK PAIN: Status: ACTIVE | Noted: 2017-09-18

## 2019-08-12 PROBLEM — G56.00 CARPAL TUNNEL SYNDROME: Status: ACTIVE | Noted: 2017-10-11

## 2019-08-12 PROCEDURE — 99214 OFFICE O/P EST MOD 30 MIN: CPT | Performed by: NURSE PRACTITIONER

## 2019-08-12 RX ORDER — IBUPROFEN 200 MG
2 CAPSULE ORAL 2 TIMES DAILY
COMMUNITY
Start: 2018-09-18 | End: 2020-05-29

## 2019-08-12 RX ORDER — ATENOLOL 50 MG/1
1 TABLET ORAL DAILY
COMMUNITY
Start: 2019-06-03 | End: 2019-09-06 | Stop reason: SDUPTHER

## 2019-08-12 RX ORDER — CALCIUM CARBONATE 300MG(750)
400 TABLET,CHEWABLE ORAL 2 TIMES DAILY
Qty: 60 TABLET | Refills: 3 | Status: SHIPPED | OUTPATIENT
Start: 2019-08-12 | End: 2020-05-29

## 2019-08-12 RX ORDER — DOXEPIN HYDROCHLORIDE 50 MG/1
1 CAPSULE ORAL DAILY
COMMUNITY
Start: 2019-06-08 | End: 2021-09-28 | Stop reason: ALTCHOICE

## 2019-08-12 RX ORDER — LANOLIN ALCOHOL/MO/W.PET/CERES
1 CREAM (GRAM) TOPICAL DAILY
COMMUNITY
Start: 2019-07-01 | End: 2020-05-29

## 2019-08-12 RX ORDER — HYDROCHLOROTHIAZIDE 25 MG/1
1 TABLET ORAL DAILY
COMMUNITY
Start: 2019-07-06 | End: 2019-12-23

## 2019-08-12 RX ORDER — LOSARTAN POTASSIUM 100 MG/1
1 TABLET ORAL DAILY
COMMUNITY
Start: 2019-06-07 | End: 2019-12-19

## 2019-08-12 RX ORDER — SERTRALINE HYDROCHLORIDE 100 MG/1
1 TABLET, FILM COATED ORAL DAILY
COMMUNITY
Start: 2019-07-06 | End: 2020-01-07

## 2019-08-12 RX ORDER — LEVOTHYROXINE SODIUM 0.1 MG/1
1 TABLET ORAL DAILY
COMMUNITY
Start: 2019-06-03 | End: 2019-08-15 | Stop reason: DRUGHIGH

## 2019-08-12 NOTE — PROGRESS NOTES
Subjective   Shannon Dyson is a 68 y.o. female.     68-year-old obese white female with history of hypertension, hyperlipidemia, RA, persistent breast disease, hypothyroidism, anxiety depression who comes in today for follow-up visit he complains of left lower extremity soreness.  Patient states last night she had a very bad cramp in her left lower calf and today she is having pain on the outside near the area with ambulation.  There is no swelling or erythema noted.  Patient is afraid of blood clots will monitor weight for 2 days and she is to let me know if symptoms persist.  I am doing blood work today and place her magnesium  Blood pressure 120/78 heart rate 72 with murmur she denies any chest pain, dizziness, tachycardia, or edema  Last visit we increased her Zoloft 100 mg and she states she is doing a lot better  Weight is unchanged at 167.  I am scheduling patient for mammogram and bone scan she still refuses colonoscopy     fasting blood work today   DEXA scan/ mammogram   Call office if left lower extremity does not improve         The following portions of the patient's history were reviewed and updated as appropriate: allergies, current medications, past family history, past medical history, past social history, past surgical history and problem list.    Review of Systems   Constitutional: Negative.    HENT: Negative.    Respiratory: Negative.    Cardiovascular: Negative.    Gastrointestinal: Negative.    Genitourinary: Negative.    Musculoskeletal:        Left leg pain   Neurological: Negative.    Psychiatric/Behavioral: Negative.        Objective   Physical Exam   Constitutional: She is oriented to person, place, and time. She appears well-developed and well-nourished.   Cardiovascular: Normal rate and regular rhythm.   Murmur heard.  Pulmonary/Chest: Effort normal.   Abdominal: Soft. Bowel sounds are normal.   Musculoskeletal:   Soreness on outside the area with ambulation no swelling or redness  or heat noted to extremity.  I think this is soreness from severity of leg cramp the patient is going to monitor   Neurological: She is alert and oriented to person, place, and time.   Skin: Skin is dry.   Psychiatric: She has a normal mood and affect.         Assessment/Plan   Shannon was seen today for leg pain.    Diagnoses and all orders for this visit:    Mixed hyperlipidemia  -     Lipid Panel With LDL / HDL Ratio    Hypovitaminosis D  -     Vitamin D 1,25 Dihydroxy    Acquired hypothyroidism  -     TSH+Free T4  -     T3    Preventative health care  -     CBC (No Diff)  -     Comprehensive Metabolic Panel    Leg cramps  -     Magnesium 400 MG tablet; Take 400 mg by mouth 2 (Two) Times a Day.    Encounter for screening for osteoporosis  -     DEXA Bone Density Axial; Future    Postmenopause  -     DEXA Bone Density Axial; Future    Breast cancer screening  -     Mammo Screening Digital Tomosynthesis Bilateral With CAD; Future    Screening mammogram, encounter for  -     Mammo Screening Digital Tomosynthesis Bilateral With CAD; Future

## 2019-08-12 NOTE — PATIENT INSTRUCTIONS
Blood work today   take magnesium as directed   any signs of symptoms of blood clot Call office for ultrasound   keep appointment for mammogram and DEXA scan

## 2019-08-14 LAB
1,25(OH)2D3 SERPL-MCNC: 28.7 PG/ML (ref 19.9–79.3)
ALBUMIN SERPL-MCNC: 4.4 G/DL (ref 3.5–4.8)
ALBUMIN/GLOB SERPL: 1.6 {RATIO} (ref 1.2–2.2)
ALP SERPL-CCNC: 104 IU/L (ref 39–117)
ALT SERPL-CCNC: 31 IU/L (ref 0–32)
AST SERPL-CCNC: 41 IU/L (ref 0–40)
BILIRUB SERPL-MCNC: 0.4 MG/DL (ref 0–1.2)
BUN SERPL-MCNC: 17 MG/DL (ref 8–27)
BUN/CREAT SERPL: 20 (ref 12–28)
CALCIUM SERPL-MCNC: 10.6 MG/DL (ref 8.7–10.3)
CHLORIDE SERPL-SCNC: 102 MMOL/L (ref 96–106)
CHOLEST SERPL-MCNC: 266 MG/DL (ref 100–199)
CO2 SERPL-SCNC: 25 MMOL/L (ref 20–29)
CREAT SERPL-MCNC: 0.84 MG/DL (ref 0.57–1)
ERYTHROCYTE [DISTWIDTH] IN BLOOD BY AUTOMATED COUNT: 14.9 % (ref 12.3–15.4)
GLOBULIN SER CALC-MCNC: 2.8 G/DL (ref 1.5–4.5)
GLUCOSE SERPL-MCNC: 104 MG/DL (ref 65–99)
HCT VFR BLD AUTO: 42.3 % (ref 34–46.6)
HDLC SERPL-MCNC: 53 MG/DL
HGB BLD-MCNC: 14.1 G/DL (ref 11.1–15.9)
LDLC SERPL CALC-MCNC: 144 MG/DL (ref 0–99)
LDLC/HDLC SERPL: 2.7 RATIO (ref 0–3.2)
MCH RBC QN AUTO: 30.3 PG (ref 26.6–33)
MCHC RBC AUTO-ENTMCNC: 33.3 G/DL (ref 31.5–35.7)
MCV RBC AUTO: 91 FL (ref 79–97)
PLATELET # BLD AUTO: 224 X10E3/UL (ref 150–450)
POTASSIUM SERPL-SCNC: 4.1 MMOL/L (ref 3.5–5.2)
PROT SERPL-MCNC: 7.2 G/DL (ref 6–8.5)
RBC # BLD AUTO: 4.66 X10E6/UL (ref 3.77–5.28)
SODIUM SERPL-SCNC: 143 MMOL/L (ref 134–144)
T3 SERPL-MCNC: 138 NG/DL (ref 71–180)
T4 FREE SERPL-MCNC: 1.15 NG/DL (ref 0.82–1.77)
TRIGL SERPL-MCNC: 343 MG/DL (ref 0–149)
TSH SERPL DL<=0.005 MIU/L-ACNC: 5.86 UIU/ML (ref 0.45–4.5)
VLDLC SERPL CALC-MCNC: 69 MG/DL (ref 5–40)
WBC # BLD AUTO: 6.6 X10E3/UL (ref 3.4–10.8)

## 2019-08-15 DIAGNOSIS — E03.9 ACQUIRED HYPOTHYROIDISM: Primary | ICD-10-CM

## 2019-08-15 DIAGNOSIS — E78.2 MIXED HYPERLIPIDEMIA: ICD-10-CM

## 2019-08-15 RX ORDER — GEMFIBROZIL 600 MG/1
600 TABLET, FILM COATED ORAL 2 TIMES DAILY
Qty: 180 TABLET | Refills: 1 | Status: SHIPPED | OUTPATIENT
Start: 2019-08-15 | End: 2020-03-31

## 2019-08-15 RX ORDER — LEVOTHYROXINE SODIUM 112 UG/1
112 TABLET ORAL DAILY
Qty: 7 TABLET | Refills: 0 | Status: SHIPPED | OUTPATIENT
Start: 2019-08-15 | End: 2019-08-15 | Stop reason: SDUPTHER

## 2019-08-15 RX ORDER — LEVOTHYROXINE SODIUM 112 UG/1
112 TABLET ORAL DAILY
Qty: 90 TABLET | Refills: 1 | Status: SHIPPED | OUTPATIENT
Start: 2019-08-15 | End: 2020-02-11

## 2019-08-16 ENCOUNTER — HOSPITAL ENCOUNTER (OUTPATIENT)
Dept: MAMMOGRAPHY | Facility: HOSPITAL | Age: 69
Discharge: HOME | End: 2019-08-16
Admitting: NURSE PRACTITIONER

## 2019-08-16 ENCOUNTER — HOSPITAL ENCOUNTER (OUTPATIENT)
Dept: BONE DENSITY | Facility: HOSPITAL | Age: 69
Discharge: HOME | End: 2019-08-16

## 2019-08-16 DIAGNOSIS — Z12.31 SCREENING MAMMOGRAM, ENCOUNTER FOR: ICD-10-CM

## 2019-08-16 DIAGNOSIS — Z13.820 ENCOUNTER FOR SCREENING FOR OSTEOPOROSIS: ICD-10-CM

## 2019-08-16 DIAGNOSIS — Z78.0 POSTMENOPAUSE: ICD-10-CM

## 2019-08-16 DIAGNOSIS — Z12.39 BREAST CANCER SCREENING: ICD-10-CM

## 2019-08-16 PROCEDURE — 77080 DXA BONE DENSITY AXIAL: CPT

## 2019-08-16 PROCEDURE — 77067 SCR MAMMO BI INCL CAD: CPT

## 2019-08-16 PROCEDURE — 77063 BREAST TOMOSYNTHESIS BI: CPT

## 2019-09-08 RX ORDER — ATENOLOL 50 MG/1
TABLET ORAL
Qty: 90 TABLET | Refills: 1 | Status: SHIPPED | OUTPATIENT
Start: 2019-09-08 | End: 2020-06-08

## 2019-10-10 RX ORDER — CHOLECALCIFEROL (VITAMIN D3) 50 MCG
CAPSULE ORAL
Qty: 180 CAPSULE | Refills: 1 | Status: SHIPPED | OUTPATIENT
Start: 2019-10-10 | End: 2020-03-31

## 2019-11-07 ENCOUNTER — OFFICE VISIT (OUTPATIENT)
Dept: FAMILY MEDICINE CLINIC | Facility: CLINIC | Age: 69
End: 2019-11-07

## 2019-11-07 VITALS
DIASTOLIC BLOOD PRESSURE: 75 MMHG | TEMPERATURE: 98.5 F | HEART RATE: 69 BPM | BODY MASS INDEX: 28.85 KG/M2 | SYSTOLIC BLOOD PRESSURE: 137 MMHG | WEIGHT: 169 LBS | OXYGEN SATURATION: 97 % | HEIGHT: 64 IN

## 2019-11-07 DIAGNOSIS — E78.2 MIXED HYPERLIPIDEMIA: Primary | ICD-10-CM

## 2019-11-07 DIAGNOSIS — R19.7 DIARRHEA, UNSPECIFIED TYPE: ICD-10-CM

## 2019-11-07 DIAGNOSIS — E53.8 B12 DEFICIENCY: ICD-10-CM

## 2019-11-07 DIAGNOSIS — Z00.00 PREVENTATIVE HEALTH CARE: ICD-10-CM

## 2019-11-07 DIAGNOSIS — E55.9 HYPOVITAMINOSIS D: ICD-10-CM

## 2019-11-07 DIAGNOSIS — K92.1 BLACK STOOLS: ICD-10-CM

## 2019-11-07 DIAGNOSIS — E03.9 ACQUIRED HYPOTHYROIDISM: ICD-10-CM

## 2019-11-07 PROCEDURE — 99214 OFFICE O/P EST MOD 30 MIN: CPT | Performed by: NURSE PRACTITIONER

## 2019-11-07 RX ORDER — DICYCLOMINE HYDROCHLORIDE 10 MG/1
CAPSULE ORAL
Qty: 60 CAPSULE | Refills: 2 | Status: SHIPPED | OUTPATIENT
Start: 2019-11-07 | End: 2020-05-29

## 2019-11-07 NOTE — PROGRESS NOTES
Subjective   Shannon Dyson is a 69 y.o. female.     69-year-old obese white female with history of hypertension, hyperlipidemia, rheumatoid arthritis, persistent breast disease, hypothyroidism, anxiety depression who comes in today with 4-day history of diarrhea that looks like it has blood in it.  I am checking , blood on her and placing her on BentylTo try to stop the diarrhea.  She says she had some reflux but nothing bad she denies any abdominal pain.  Patient has no history of bowel issues has not been on any antibiotics or had any changes in medication.  Blood pressure 136/74 heart rate 68 she denies any chest pain, dyspnea, tachycardia, dizziness or edema.  Patient is to schedule eye exam but other than that is up-to-date on preventive maintenance  Weight is 169     Bentyl 10 mg 1 4 times a day as needed diarrhea   occult blood stool   blood work today         The following portions of the patient's history were reviewed and updated as appropriate: allergies, current medications, past family history, past medical history, past social history, past surgical history and problem list.    Review of Systems   Constitutional: Negative.    HENT: Negative.    Respiratory: Negative.    Cardiovascular: Negative.    Gastrointestinal: Positive for blood in stool and diarrhea.   Genitourinary: Negative.    Musculoskeletal: Negative.    Skin: Negative.    Neurological: Negative.    Psychiatric/Behavioral: Negative.        Objective   Physical Exam   Constitutional: She is oriented to person, place, and time. She appears well-developed and well-nourished.   Cardiovascular: Normal rate and regular rhythm.   Pulmonary/Chest: Effort normal and breath sounds normal.   Abdominal: Soft. Bowel sounds are normal.   Musculoskeletal: Normal range of motion.   Neurological: She is alert and oriented to person, place, and time.   Skin: Skin is warm and dry.   Psychiatric: She has a normal mood and affect.         Assessment/Plan    Shannon was seen today for rectal bleeding.    Diagnoses and all orders for this visit:    Mixed hyperlipidemia  -     Lipid Panel With LDL / HDL Ratio    Hypovitaminosis D  -     Vitamin D 1,25 Dihydroxy    Acquired hypothyroidism  -     T3  -     TSH+Free T4    B12 deficiency  -     Vitamin B12    Preventative health care  -     CBC & Differential  -     Comprehensive Metabolic Panel    Diarrhea, unspecified type    Black stools    Other orders  -     dicyclomine (BENTYL) 10 MG capsule; One to four times a day as needed for diarrhea. Play around with dose to get diarrhea controlled

## 2019-11-08 LAB
1,25(OH)2D3 SERPL-MCNC: 55.3 PG/ML (ref 19.9–79.3)
ALBUMIN SERPL-MCNC: 4.7 G/DL (ref 3.5–4.8)
ALBUMIN/GLOB SERPL: 2 {RATIO} (ref 1.2–2.2)
ALP SERPL-CCNC: 116 IU/L (ref 39–117)
ALT SERPL-CCNC: 55 IU/L (ref 0–32)
AST SERPL-CCNC: 67 IU/L (ref 0–40)
BASOPHILS # BLD AUTO: 0.1 X10E3/UL (ref 0–0.2)
BASOPHILS NFR BLD AUTO: 1 %
BILIRUB SERPL-MCNC: 0.4 MG/DL (ref 0–1.2)
BUN SERPL-MCNC: 23 MG/DL (ref 8–27)
BUN/CREAT SERPL: 32 (ref 12–28)
CALCIUM SERPL-MCNC: 10.2 MG/DL (ref 8.7–10.3)
CHLORIDE SERPL-SCNC: 103 MMOL/L (ref 96–106)
CHOLEST SERPL-MCNC: 263 MG/DL (ref 100–199)
CO2 SERPL-SCNC: 23 MMOL/L (ref 20–29)
CREAT SERPL-MCNC: 0.72 MG/DL (ref 0.57–1)
EOSINOPHIL # BLD AUTO: 0.3 X10E3/UL (ref 0–0.4)
EOSINOPHIL NFR BLD AUTO: 5 %
ERYTHROCYTE [DISTWIDTH] IN BLOOD BY AUTOMATED COUNT: 14.7 % (ref 12.3–15.4)
GLOBULIN SER CALC-MCNC: 2.4 G/DL (ref 1.5–4.5)
GLUCOSE SERPL-MCNC: 93 MG/DL (ref 65–99)
HCT VFR BLD AUTO: 40 % (ref 34–46.6)
HDLC SERPL-MCNC: 55 MG/DL
HGB BLD-MCNC: 13.2 G/DL (ref 11.1–15.9)
IMM GRANULOCYTES # BLD AUTO: 0 X10E3/UL (ref 0–0.1)
IMM GRANULOCYTES NFR BLD AUTO: 0 %
LDLC SERPL CALC-MCNC: 161 MG/DL (ref 0–99)
LDLC/HDLC SERPL: 2.9 RATIO (ref 0–3.2)
LYMPHOCYTES # BLD AUTO: 1.3 X10E3/UL (ref 0.7–3.1)
LYMPHOCYTES NFR BLD AUTO: 20 %
MCH RBC QN AUTO: 29.9 PG (ref 26.6–33)
MCHC RBC AUTO-ENTMCNC: 33 G/DL (ref 31.5–35.7)
MCV RBC AUTO: 91 FL (ref 79–97)
MONOCYTES # BLD AUTO: 0.5 X10E3/UL (ref 0.1–0.9)
MONOCYTES NFR BLD AUTO: 8 %
NEUTROPHILS # BLD AUTO: 4.3 X10E3/UL (ref 1.4–7)
NEUTROPHILS NFR BLD AUTO: 66 %
PLATELET # BLD AUTO: 239 X10E3/UL (ref 150–450)
POTASSIUM SERPL-SCNC: 4.1 MMOL/L (ref 3.5–5.2)
PROT SERPL-MCNC: 7.1 G/DL (ref 6–8.5)
RBC # BLD AUTO: 4.42 X10E6/UL (ref 3.77–5.28)
SODIUM SERPL-SCNC: 144 MMOL/L (ref 134–144)
T3 SERPL-MCNC: 111 NG/DL (ref 71–180)
T4 FREE SERPL-MCNC: 1.07 NG/DL (ref 0.82–1.77)
TRIGL SERPL-MCNC: 234 MG/DL (ref 0–149)
TSH SERPL DL<=0.005 MIU/L-ACNC: 4.44 UIU/ML (ref 0.45–4.5)
VIT B12 SERPL-MCNC: 924 PG/ML (ref 232–1245)
VLDLC SERPL CALC-MCNC: 47 MG/DL (ref 5–40)
WBC # BLD AUTO: 6.5 X10E3/UL (ref 3.4–10.8)

## 2019-11-13 ENCOUNTER — CLINICAL SUPPORT (OUTPATIENT)
Dept: FAMILY MEDICINE CLINIC | Facility: CLINIC | Age: 69
End: 2019-11-13

## 2019-11-13 DIAGNOSIS — R19.7 DIARRHEA, UNSPECIFIED TYPE: Primary | ICD-10-CM

## 2019-11-13 LAB
DEVELOPER EXPIRATION DATE: ABNORMAL
DEVELOPER LOT NUMBER: ABNORMAL
EXPIRATION DATE: ABNORMAL
FECAL OCCULT BLOOD SCREEN, POC: POSITIVE
Lab: ABNORMAL
NEGATIVE CONTROL: NEGATIVE
POSITIVE CONTROL: POSITIVE

## 2019-11-13 PROCEDURE — 82270 OCCULT BLOOD FECES: CPT | Performed by: NURSE PRACTITIONER

## 2019-11-15 ENCOUNTER — RESULTS ENCOUNTER (OUTPATIENT)
Dept: FAMILY MEDICINE CLINIC | Facility: CLINIC | Age: 69
End: 2019-11-15

## 2019-11-15 DIAGNOSIS — E78.2 MIXED HYPERLIPIDEMIA: ICD-10-CM

## 2019-11-15 DIAGNOSIS — R19.5 POSITIVE FECAL OCCULT BLOOD TEST: ICD-10-CM

## 2019-11-15 DIAGNOSIS — Z12.11 COLON CANCER SCREENING: Primary | ICD-10-CM

## 2019-11-15 DIAGNOSIS — E03.9 ACQUIRED HYPOTHYROIDISM: ICD-10-CM

## 2019-12-19 ENCOUNTER — OFFICE (AMBULATORY)
Dept: URBAN - METROPOLITAN AREA PATHOLOGY 4 | Facility: PATHOLOGY | Age: 69
End: 2019-12-19
Payer: MEDICARE

## 2019-12-19 ENCOUNTER — ON CAMPUS - OUTPATIENT (AMBULATORY)
Dept: URBAN - METROPOLITAN AREA HOSPITAL 2 | Facility: HOSPITAL | Age: 69
End: 2019-12-19
Payer: MEDICARE

## 2019-12-19 VITALS
SYSTOLIC BLOOD PRESSURE: 132 MMHG | SYSTOLIC BLOOD PRESSURE: 81 MMHG | SYSTOLIC BLOOD PRESSURE: 96 MMHG | OXYGEN SATURATION: 100 % | DIASTOLIC BLOOD PRESSURE: 66 MMHG | DIASTOLIC BLOOD PRESSURE: 48 MMHG | OXYGEN SATURATION: 98 % | DIASTOLIC BLOOD PRESSURE: 39 MMHG | WEIGHT: 189 LBS | TEMPERATURE: 98.2 F | OXYGEN SATURATION: 99 % | SYSTOLIC BLOOD PRESSURE: 77 MMHG | OXYGEN SATURATION: 95 % | DIASTOLIC BLOOD PRESSURE: 71 MMHG | DIASTOLIC BLOOD PRESSURE: 44 MMHG | SYSTOLIC BLOOD PRESSURE: 89 MMHG | DIASTOLIC BLOOD PRESSURE: 62 MMHG | HEART RATE: 61 BPM | RESPIRATION RATE: 18 BRPM | SYSTOLIC BLOOD PRESSURE: 110 MMHG | SYSTOLIC BLOOD PRESSURE: 88 MMHG | HEIGHT: 63 IN | HEART RATE: 62 BPM | HEART RATE: 75 BPM | OXYGEN SATURATION: 97 % | DIASTOLIC BLOOD PRESSURE: 45 MMHG | HEART RATE: 63 BPM | HEART RATE: 60 BPM | RESPIRATION RATE: 16 BRPM | HEART RATE: 59 BPM | RESPIRATION RATE: 15 BRPM | SYSTOLIC BLOOD PRESSURE: 116 MMHG

## 2019-12-19 DIAGNOSIS — D12.3 BENIGN NEOPLASM OF TRANSVERSE COLON: ICD-10-CM

## 2019-12-19 DIAGNOSIS — K57.30 DIVERTICULOSIS OF LARGE INTESTINE WITHOUT PERFORATION OR ABS: ICD-10-CM

## 2019-12-19 DIAGNOSIS — K64.0 FIRST DEGREE HEMORRHOIDS: ICD-10-CM

## 2019-12-19 DIAGNOSIS — R19.5 OTHER FECAL ABNORMALITIES: ICD-10-CM

## 2019-12-19 LAB
GI HISTOLOGY: A. UNSPECIFIED: (no result)
GI HISTOLOGY: PDF REPORT: (no result)

## 2019-12-19 PROCEDURE — 88305 TISSUE EXAM BY PATHOLOGIST: CPT | Performed by: INTERNAL MEDICINE

## 2019-12-19 PROCEDURE — 45385 COLONOSCOPY W/LESION REMOVAL: CPT | Performed by: INTERNAL MEDICINE

## 2019-12-19 PROCEDURE — 88305 TISSUE EXAM BY PATHOLOGIST: CPT | Mod: 26 | Performed by: INTERNAL MEDICINE

## 2019-12-19 RX ORDER — LOSARTAN POTASSIUM 100 MG/1
TABLET ORAL
Qty: 90 TABLET | Refills: 2 | Status: SHIPPED | OUTPATIENT
Start: 2019-12-19 | End: 2020-09-29

## 2019-12-20 ENCOUNTER — LAB REQUISITION (OUTPATIENT)
Dept: LAB | Facility: HOSPITAL | Age: 69
End: 2019-12-20

## 2019-12-20 DIAGNOSIS — K64.0 FIRST DEGREE HEMORRHOIDS: ICD-10-CM

## 2019-12-20 DIAGNOSIS — D12.3 BENIGN NEOPLASM OF TRANSVERSE COLON: ICD-10-CM

## 2019-12-20 DIAGNOSIS — R19.5 OTHER FECAL ABNORMALITIES: ICD-10-CM

## 2019-12-20 DIAGNOSIS — K57.30 DIVERTICULOSIS OF LARGE INTESTINE WITHOUT PERFORATION OR ABSCESS WITHOUT BLEEDING: ICD-10-CM

## 2019-12-23 LAB
LAB AP CASE REPORT: NORMAL
PATH REPORT.FINAL DX SPEC: NORMAL
PATH REPORT.GROSS SPEC: NORMAL

## 2019-12-23 RX ORDER — HYDROCHLOROTHIAZIDE 25 MG/1
TABLET ORAL
Qty: 90 TABLET | Refills: 2 | Status: SHIPPED | OUTPATIENT
Start: 2019-12-23 | End: 2020-10-20 | Stop reason: SDUPTHER

## 2020-01-07 RX ORDER — SERTRALINE HYDROCHLORIDE 100 MG/1
TABLET, FILM COATED ORAL
Qty: 90 TABLET | Refills: 2 | Status: SHIPPED | OUTPATIENT
Start: 2020-01-07 | End: 2020-10-20 | Stop reason: SDUPTHER

## 2020-01-28 ENCOUNTER — PATIENT OUTREACH (OUTPATIENT)
Dept: CASE MANAGEMENT | Facility: OTHER | Age: 70
End: 2020-01-28

## 2020-01-28 NOTE — OUTREACH NOTE
Spoke with patient to schedule AWV. Pt stated that Maureen has already come to her house to complete this visit. I informed her that her insurance company only covers one of these visit types per year. Pt will call to schedule next year.     Ashkan Ramos, [facility]  Health and    Phone: [phone]

## 2020-02-11 RX ORDER — LEVOTHYROXINE SODIUM 112 UG/1
TABLET ORAL
Qty: 90 TABLET | Refills: 1 | Status: SHIPPED | OUTPATIENT
Start: 2020-02-11 | End: 2020-08-11 | Stop reason: SDUPTHER

## 2020-03-31 RX ORDER — GEMFIBROZIL 600 MG/1
TABLET, FILM COATED ORAL
Qty: 180 TABLET | Refills: 1 | Status: SHIPPED | OUTPATIENT
Start: 2020-03-31 | End: 2020-10-20 | Stop reason: SDUPTHER

## 2020-03-31 RX ORDER — CHOLECALCIFEROL (VITAMIN D3) 50 MCG
CAPSULE ORAL
Qty: 180 CAPSULE | Refills: 1 | Status: SHIPPED | OUTPATIENT
Start: 2020-03-31 | End: 2020-09-06

## 2020-05-08 RX ORDER — LEVOTHYROXINE SODIUM 112 UG/1
TABLET ORAL
Qty: 90 TABLET | Refills: 1 | OUTPATIENT
Start: 2020-05-08

## 2020-05-18 DIAGNOSIS — E78.2 MIXED HYPERLIPIDEMIA: Primary | ICD-10-CM

## 2020-05-18 DIAGNOSIS — I10 HYPERTENSION, UNSPECIFIED TYPE: ICD-10-CM

## 2020-05-18 DIAGNOSIS — E03.9 ACQUIRED HYPOTHYROIDISM: ICD-10-CM

## 2020-05-19 LAB
ALBUMIN SERPL-MCNC: 5 G/DL (ref 3.7–4.7)
ALBUMIN/GLOB SERPL: 1.8 {RATIO} (ref 1.2–2.2)
ALP SERPL-CCNC: 141 IU/L (ref 39–117)
ALT SERPL-CCNC: 24 IU/L (ref 0–32)
AST SERPL-CCNC: 34 IU/L (ref 0–40)
BASOPHILS # BLD AUTO: 0.1 X10E3/UL (ref 0–0.2)
BASOPHILS NFR BLD AUTO: 2 %
BILIRUB SERPL-MCNC: 0.3 MG/DL (ref 0–1.2)
BUN SERPL-MCNC: 29 MG/DL (ref 8–27)
BUN/CREAT SERPL: 29 (ref 12–28)
CALCIUM SERPL-MCNC: 10.7 MG/DL (ref 8.7–10.3)
CHLORIDE SERPL-SCNC: 102 MMOL/L (ref 96–106)
CHOLEST SERPL-MCNC: 230 MG/DL (ref 100–199)
CO2 SERPL-SCNC: 21 MMOL/L (ref 20–29)
CREAT SERPL-MCNC: 1 MG/DL (ref 0.57–1)
EOSINOPHIL # BLD AUTO: 0.4 X10E3/UL (ref 0–0.4)
EOSINOPHIL NFR BLD AUTO: 5 %
ERYTHROCYTE [DISTWIDTH] IN BLOOD BY AUTOMATED COUNT: 12.6 % (ref 11.7–15.4)
GLOBULIN SER CALC-MCNC: 2.8 G/DL (ref 1.5–4.5)
GLUCOSE SERPL-MCNC: 111 MG/DL (ref 65–99)
HCT VFR BLD AUTO: 37.6 % (ref 34–46.6)
HDLC SERPL-MCNC: 44 MG/DL
HGB BLD-MCNC: 12.9 G/DL (ref 11.1–15.9)
IMM GRANULOCYTES # BLD AUTO: 0 X10E3/UL (ref 0–0.1)
IMM GRANULOCYTES NFR BLD AUTO: 0 %
LDLC SERPL CALC-MCNC: 140 MG/DL (ref 0–99)
LYMPHOCYTES # BLD AUTO: 1.8 X10E3/UL (ref 0.7–3.1)
LYMPHOCYTES NFR BLD AUTO: 28 %
MCH RBC QN AUTO: 31.6 PG (ref 26.6–33)
MCHC RBC AUTO-ENTMCNC: 34.3 G/DL (ref 31.5–35.7)
MCV RBC AUTO: 92 FL (ref 79–97)
MONOCYTES # BLD AUTO: 0.6 X10E3/UL (ref 0.1–0.9)
MONOCYTES NFR BLD AUTO: 9 %
NEUTROPHILS # BLD AUTO: 3.7 X10E3/UL (ref 1.4–7)
NEUTROPHILS NFR BLD AUTO: 56 %
PLATELET # BLD AUTO: 288 X10E3/UL (ref 150–450)
POTASSIUM SERPL-SCNC: 4 MMOL/L (ref 3.5–5.2)
PROT SERPL-MCNC: 7.8 G/DL (ref 6–8.5)
RBC # BLD AUTO: 4.08 X10E6/UL (ref 3.77–5.28)
SODIUM SERPL-SCNC: 144 MMOL/L (ref 134–144)
T3FREE SERPL-MCNC: 2.6 PG/ML (ref 2–4.4)
T4 SERPL-MCNC: 6.4 UG/DL (ref 4.5–12)
TRIGL SERPL-MCNC: 228 MG/DL (ref 0–149)
TSH SERPL DL<=0.005 MIU/L-ACNC: 0.34 UIU/ML (ref 0.45–4.5)
VLDLC SERPL CALC-MCNC: 46 MG/DL (ref 5–40)
WBC # BLD AUTO: 6.6 X10E3/UL (ref 3.4–10.8)

## 2020-05-29 ENCOUNTER — OFFICE VISIT (OUTPATIENT)
Dept: FAMILY MEDICINE CLINIC | Facility: CLINIC | Age: 70
End: 2020-05-29

## 2020-05-29 DIAGNOSIS — E78.2 MIXED HYPERLIPIDEMIA: Primary | ICD-10-CM

## 2020-05-29 DIAGNOSIS — E03.9 ACQUIRED HYPOTHYROIDISM: ICD-10-CM

## 2020-05-29 PROCEDURE — 99442 PR PHYS/QHP TELEPHONE EVALUATION 11-20 MIN: CPT | Performed by: NURSE PRACTITIONER

## 2020-05-29 NOTE — PATIENT INSTRUCTIONS
Schedule eye exam  Mammogram in August  Diet and exercise as discussed  Fasting blood work and office visit in August

## 2020-05-29 NOTE — PROGRESS NOTES
Shannon Dyson is a 69 y.o. female.     You have chosen to receive care through a telephone visit. Do you consent to use a telephone visit for your medical care today? Yes    69-year-old obese white female with history of hypertension, hyperlipidemia, rheumatoid arthritis, fibrocystic breast disease, hypothyroidism, anxiety depression who calls in today for follow-up telephone visit.  Patient had labs drawn previous to visit.  Labs were within normal limits with exception of lipid panel with mildly elevated cholesterol and triglycerides of 228.  6 months ago I placed her on gemfibrozil but it has not improved very much.  Patient admits to eating a lot of sweets and carbs and we discussed changing her diet or changing medications.  Patient wants to try to change her diet and repeat labs in 3 months  Patient does not check blood pressure at home but she denies any chest pain, dyspnea, tachycardia or dizziness.  She will come in on next visit and we will evaluate blood pressure  Thyroid levels were within normal limits no changes at this time will recheck in 3 months the patient denies any tachycardia or symptoms  On last visit in November of last year patient was having a lot of diarrhea and was placed on Bentyl but she states that has resolved now  Patient had colonoscopy in December 2019 is up-to-date on mammograms and DEXA scan but still needs to schedule an eye exam    Visit time 18 minutes    Mammogram in August  Fasting blood work and office visit in August  Diet and exercise as we discussed       The following portions of the patient's history were reviewed and updated as appropriate: allergies, current medications, past family history, past medical history, past social history, past surgical history and problem list.    There were no vitals filed for this visit.  There is no height or weight on file to calculate BMI.    Past Medical History:   Diagnosis Date   • Anxiety    • Breast cyst    •  Fibrocystic breast    • GERD (gastroesophageal reflux disease)    • Hyperlipidemia    • Hypertension    • Hypothyroidism      Past Surgical History:   Procedure Laterality Date   • APPENDECTOMY     • BREAST BIOPSY     • BREAST SURGERY     • HYSTERECTOMY       Family History   Problem Relation Age of Onset   • Heart disease Mother    • Arthritis Mother    • Heart disease Father    • Arthritis Father      Immunization History   Administered Date(s) Administered   • Fluzone High Dose =>65 Years ([employer] ONLY) 11/02/2019   • Pneumococcal Conjugate 13-Valent (PCV13) 10/02/2019   • Shingrix 10/02/2019       Orders Only on 05/18/2020   Component Date Value Ref Range Status   • WBC 05/18/2020 6.6  3.4 - 10.8 x10E3/uL Final   • RBC 05/18/2020 4.08  3.77 - 5.28 x10E6/uL Final   • Hemoglobin 05/18/2020 12.9  11.1 - 15.9 g/dL Final   • Hematocrit 05/18/2020 37.6  34.0 - 46.6 % Final   • MCV 05/18/2020 92  79 - 97 fL Final   • MCH 05/18/2020 31.6  26.6 - 33.0 pg Final   • MCHC 05/18/2020 34.3  31.5 - 35.7 g/dL Final   • RDW 05/18/2020 12.6  11.7 - 15.4 % Final   • Platelets 05/18/2020 288  150 - 450 x10E3/uL Final   • Neutrophil Rel % 05/18/2020 56  Not Estab. % Final   • Lymphocyte Rel % 05/18/2020 28  Not Estab. % Final   • Monocyte Rel % 05/18/2020 9  Not Estab. % Final   • Eosinophil Rel % 05/18/2020 5  Not Estab. % Final   • Basophil Rel % 05/18/2020 2  Not Estab. % Final   • Neutrophils Absolute 05/18/2020 3.7  1.4 - 7.0 x10E3/uL Final   • Lymphocytes Absolute 05/18/2020 1.8  0.7 - 3.1 x10E3/uL Final   • Monocytes Absolute 05/18/2020 0.6  0.1 - 0.9 x10E3/uL Final   • Eosinophils Absolute 05/18/2020 0.4  0.0 - 0.4 x10E3/uL Final   • Basophils Absolute 05/18/2020 0.1  0.0 - 0.2 x10E3/uL Final   • Immature Granulocyte Rel % 05/18/2020 0  Not Estab. % Final   • Immature Grans Absolute 05/18/2020 0.0  0.0 - 0.1 x10E3/uL Final   • Glucose 05/18/2020 111* 65 - 99 mg/dL Final   • BUN 05/18/2020 29* 8 - 27 mg/dL Final   •  Creatinine 05/18/2020 1.00  0.57 - 1.00 mg/dL Final   • eGFR Non African Am 05/18/2020 57* >59 mL/min/1.73 Final   • eGFR African Am 05/18/2020 65  >59 mL/min/1.73 Final   • BUN/Creatinine Ratio 05/18/2020 29* 12 - 28 Final   • Sodium 05/18/2020 144  134 - 144 mmol/L Final   • Potassium 05/18/2020 4.0  3.5 - 5.2 mmol/L Final   • Chloride 05/18/2020 102  96 - 106 mmol/L Final   • Total CO2 05/18/2020 21  20 - 29 mmol/L Final   • Calcium 05/18/2020 10.7* 8.7 - 10.3 mg/dL Final   • Total Protein 05/18/2020 7.8  6.0 - 8.5 g/dL Final   • Albumin 05/18/2020 5.0* 3.7 - 4.7 g/dL Final   • Globulin 05/18/2020 2.8  1.5 - 4.5 g/dL Final   • A/G Ratio 05/18/2020 1.8  1.2 - 2.2 Final   • Total Bilirubin 05/18/2020 0.3  0.0 - 1.2 mg/dL Final   • Alkaline Phosphatase 05/18/2020 141* 39 - 117 IU/L Final   • AST (SGOT) 05/18/2020 34  0 - 40 IU/L Final   • ALT (SGPT) 05/18/2020 24  0 - 32 IU/L Final   • Total Cholesterol 05/18/2020 230* 100 - 199 mg/dL Final   • Triglycerides 05/18/2020 228* 0 - 149 mg/dL Final   • HDL Cholesterol 05/18/2020 44  >39 mg/dL Final   • VLDL Cholesterol 05/18/2020 46* 5 - 40 mg/dL Final   • LDL Cholesterol  05/18/2020 140* 0 - 99 mg/dL Final   • T4, Total 05/18/2020 6.4  4.5 - 12.0 ug/dL Final   • TSH 05/18/2020 0.338* 0.450 - 4.500 uIU/mL Final   • T3, Free 05/18/2020 2.6  2.0 - 4.4 pg/mL Final         Review of Systems   Constitutional: Negative.    HENT: Negative.    Respiratory: Negative.    Cardiovascular: Negative.    Genitourinary: Negative.    Musculoskeletal: Negative.    Skin: Negative.    Neurological: Negative.    Psychiatric/Behavioral: Negative.        Objective   Physical Exam   Constitutional: She is oriented to person, place, and time.   Pulmonary/Chest:   No cough or dyspnea noted with conversation   Neurological: She is alert and oriented to person, place, and time.       Procedures    Assessment/Plan   Shannon was seen today for hypertension, hyperlipidemia and  hypothyroidism.    Diagnoses and all orders for this visit:    Mixed hyperlipidemia    Acquired hypothyroidism          Current Outpatient Medications:   •  atenolol (TENORMIN) 50 MG tablet, TAKE 1 TABLET EVERY DAY, Disp: 90 tablet, Rfl: 1  •  D3 SUPER STRENGTH 50 MCG (2000 UT) capsule, TAKE 2 CAPSULES EVERY DAY, Disp: 180 capsule, Rfl: 1  •  doxepin (SINEquan) 50 MG capsule, Take 1 capsule by mouth Daily., Disp: , Rfl:   •  gemfibrozil (LOPID) 600 MG tablet, TAKE 1 TABLET TWICE DAILY, Disp: 180 tablet, Rfl: 1  •  hydroCHLOROthiazide (HYDRODIURIL) 25 MG tablet, TAKE 1/2 TO  1 TABLET  DAILY, Disp: 90 tablet, Rfl: 2  •  levothyroxine (SYNTHROID, LEVOTHROID) 112 MCG tablet, TAKE 1 TABLET EVERY DAY, Disp: 90 tablet, Rfl: 1  •  losartan (COZAAR) 100 MG tablet, TAKE 1 TABLET EVERY DAY, Disp: 90 tablet, Rfl: 2  •  sertraline (ZOLOFT) 100 MG tablet, TAKE 1 TABLET EVERY DAY, Disp: 90 tablet, Rfl: 2

## 2020-06-08 RX ORDER — ATENOLOL 50 MG/1
TABLET ORAL
Qty: 90 TABLET | Refills: 1 | Status: SHIPPED | OUTPATIENT
Start: 2020-06-08 | End: 2020-09-14

## 2020-07-16 ENCOUNTER — OFFICE VISIT (OUTPATIENT)
Dept: FAMILY MEDICINE CLINIC | Facility: CLINIC | Age: 70
End: 2020-07-16

## 2020-07-16 VITALS
WEIGHT: 159 LBS | DIASTOLIC BLOOD PRESSURE: 69 MMHG | TEMPERATURE: 97.3 F | BODY MASS INDEX: 27.14 KG/M2 | HEIGHT: 64 IN | OXYGEN SATURATION: 95 % | HEART RATE: 63 BPM | SYSTOLIC BLOOD PRESSURE: 114 MMHG

## 2020-07-16 DIAGNOSIS — K13.79 PAIN IN MOUTH: Primary | ICD-10-CM

## 2020-07-16 PROCEDURE — 99213 OFFICE O/P EST LOW 20 MIN: CPT | Performed by: NURSE PRACTITIONER

## 2020-07-16 NOTE — PROGRESS NOTES
"    Shannon Dyson is a 69 y.o. female.     69-year-old obese white female with history of hypertension, hyperlipidemia, rheumatoid arthritis, fibrocystic breast disease, hypothyroidism, anxiety depression who comes in today with complaints of 2 to 3-week history of mouth and tongue being sore making it difficult to eat and sore throat.  Examination reveals rough red tongue no blisters or lesions seen in mouth.  She is afebrile has no other symptoms.  I am giving her nystatin swish and swallow if this does not improve by Monday she is to call office  Blood pressure 114/66 heart rate 62 she denies any chest pain, dyspnea, tachycardia or dizziness  Weight is 158    Nystatin swish and swallow       The following portions of the patient's history were reviewed and updated as appropriate: allergies, current medications, past family history, past medical history, past social history, past surgical history and problem list.    Vitals:    07/16/20 1100   BP: 114/69   BP Location: Right arm   Patient Position: Sitting   Cuff Size: Adult   Pulse: 63   Temp: 97.3 °F (36.3 °C)   TempSrc: Temporal   SpO2: 95%   Weight: 72.1 kg (159 lb)   Height: 161.3 cm (63.5\")     Body mass index is 27.72 kg/m².    Past Medical History:   Diagnosis Date   • Anxiety    • Breast cyst    • Fibrocystic breast    • GERD (gastroesophageal reflux disease)    • Hyperlipidemia    • Hypertension    • Hypothyroidism      Past Surgical History:   Procedure Laterality Date   • APPENDECTOMY     • BREAST BIOPSY     • BREAST SURGERY     • HYSTERECTOMY       Family History   Problem Relation Age of Onset   • Heart disease Mother    • Arthritis Mother    • Heart disease Father    • Arthritis Father      Immunization History   Administered Date(s) Administered   • FLUARIX/FLUZONE/AFLURIA/FLULAVAL QUAD 11/02/2019   • Fluzone High Dose =>65 Years ([employer] ONLY) 09/28/2019, 11/02/2019   • H1N1 Inj Preservative Free 11/19/2009   • Influenza Quad Vaccine " (Inpatient) 10/08/2015   • Pneumococcal Conjugate 13-Valent (PCV13) 10/02/2019   • Shingrix 10/02/2019   • Zostavax 10/28/2016       Orders Only on 05/18/2020   Component Date Value Ref Range Status   • WBC 05/18/2020 6.6  3.4 - 10.8 x10E3/uL Final   • RBC 05/18/2020 4.08  3.77 - 5.28 x10E6/uL Final   • Hemoglobin 05/18/2020 12.9  11.1 - 15.9 g/dL Final   • Hematocrit 05/18/2020 37.6  34.0 - 46.6 % Final   • MCV 05/18/2020 92  79 - 97 fL Final   • MCH 05/18/2020 31.6  26.6 - 33.0 pg Final   • MCHC 05/18/2020 34.3  31.5 - 35.7 g/dL Final   • RDW 05/18/2020 12.6  11.7 - 15.4 % Final   • Platelets 05/18/2020 288  150 - 450 x10E3/uL Final   • Neutrophil Rel % 05/18/2020 56  Not Estab. % Final   • Lymphocyte Rel % 05/18/2020 28  Not Estab. % Final   • Monocyte Rel % 05/18/2020 9  Not Estab. % Final   • Eosinophil Rel % 05/18/2020 5  Not Estab. % Final   • Basophil Rel % 05/18/2020 2  Not Estab. % Final   • Neutrophils Absolute 05/18/2020 3.7  1.4 - 7.0 x10E3/uL Final   • Lymphocytes Absolute 05/18/2020 1.8  0.7 - 3.1 x10E3/uL Final   • Monocytes Absolute 05/18/2020 0.6  0.1 - 0.9 x10E3/uL Final   • Eosinophils Absolute 05/18/2020 0.4  0.0 - 0.4 x10E3/uL Final   • Basophils Absolute 05/18/2020 0.1  0.0 - 0.2 x10E3/uL Final   • Immature Granulocyte Rel % 05/18/2020 0  Not Estab. % Final   • Immature Grans Absolute 05/18/2020 0.0  0.0 - 0.1 x10E3/uL Final   • Glucose 05/18/2020 111* 65 - 99 mg/dL Final   • BUN 05/18/2020 29* 8 - 27 mg/dL Final   • Creatinine 05/18/2020 1.00  0.57 - 1.00 mg/dL Final   • eGFR Non African Am 05/18/2020 57* >59 mL/min/1.73 Final   • eGFR African Am 05/18/2020 65  >59 mL/min/1.73 Final   • BUN/Creatinine Ratio 05/18/2020 29* 12 - 28 Final   • Sodium 05/18/2020 144  134 - 144 mmol/L Final   • Potassium 05/18/2020 4.0  3.5 - 5.2 mmol/L Final   • Chloride 05/18/2020 102  96 - 106 mmol/L Final   • Total CO2 05/18/2020 21  20 - 29 mmol/L Final   • Calcium 05/18/2020 10.7* 8.7 - 10.3 mg/dL Final   •  Total Protein 05/18/2020 7.8  6.0 - 8.5 g/dL Final   • Albumin 05/18/2020 5.0* 3.7 - 4.7 g/dL Final   • Globulin 05/18/2020 2.8  1.5 - 4.5 g/dL Final   • A/G Ratio 05/18/2020 1.8  1.2 - 2.2 Final   • Total Bilirubin 05/18/2020 0.3  0.0 - 1.2 mg/dL Final   • Alkaline Phosphatase 05/18/2020 141* 39 - 117 IU/L Final   • AST (SGOT) 05/18/2020 34  0 - 40 IU/L Final   • ALT (SGPT) 05/18/2020 24  0 - 32 IU/L Final   • Total Cholesterol 05/18/2020 230* 100 - 199 mg/dL Final   • Triglycerides 05/18/2020 228* 0 - 149 mg/dL Final   • HDL Cholesterol 05/18/2020 44  >39 mg/dL Final   • VLDL Cholesterol 05/18/2020 46* 5 - 40 mg/dL Final   • LDL Cholesterol  05/18/2020 140* 0 - 99 mg/dL Final   • T4, Total 05/18/2020 6.4  4.5 - 12.0 ug/dL Final   • TSH 05/18/2020 0.338* 0.450 - 4.500 uIU/mL Final   • T3, Free 05/18/2020 2.6  2.0 - 4.4 pg/mL Final         Review of Systems   Constitutional: Negative.    HENT:        Mouth and tongue are sore   Respiratory: Negative.    Cardiovascular: Negative.    Gastrointestinal: Negative.    Genitourinary: Negative.    Musculoskeletal: Negative.    Skin: Negative.    Neurological: Negative.    Psychiatric/Behavioral: Negative.        Objective   Physical Exam   Constitutional: She is oriented to person, place, and time. She appears well-developed and well-nourished.   HENT:   Tongue rough and red   Cardiovascular: Normal rate and regular rhythm.   Pulmonary/Chest: Effort normal and breath sounds normal.   Abdominal: Soft. Bowel sounds are normal.   Musculoskeletal: Normal range of motion.   Neurological: She is alert and oriented to person, place, and time.   Skin: Skin is warm and dry.   Psychiatric: She has a normal mood and affect.       Procedures    Assessment/Plan   There are no diagnoses linked to this encounter.      Current Outpatient Medications:   •  atenolol (TENORMIN) 50 MG tablet, TAKE 1 TABLET EVERY DAY, Disp: 90 tablet, Rfl: 1  •  D3 SUPER STRENGTH 50 MCG (2000 UT) capsule, TAKE  2 CAPSULES EVERY DAY, Disp: 180 capsule, Rfl: 1  •  doxepin (SINEquan) 50 MG capsule, Take 1 capsule by mouth Daily., Disp: , Rfl:   •  gemfibrozil (LOPID) 600 MG tablet, TAKE 1 TABLET TWICE DAILY, Disp: 180 tablet, Rfl: 1  •  hydroCHLOROthiazide (HYDRODIURIL) 25 MG tablet, TAKE 1/2 TO  1 TABLET  DAILY, Disp: 90 tablet, Rfl: 2  •  levothyroxine (SYNTHROID, LEVOTHROID) 112 MCG tablet, TAKE 1 TABLET EVERY DAY, Disp: 90 tablet, Rfl: 1  •  losartan (COZAAR) 100 MG tablet, TAKE 1 TABLET EVERY DAY, Disp: 90 tablet, Rfl: 2  •  sertraline (ZOLOFT) 100 MG tablet, TAKE 1 TABLET EVERY DAY, Disp: 90 tablet, Rfl: 2  •  nystatin (MYCOSTATIN) 082522 UNIT/ML suspension, Swish and swallow 5 mL 4 (Four) Times a Day., Disp: 473 mL, Rfl: 0

## 2020-07-16 NOTE — PATIENT INSTRUCTIONS
Use nystatin as directed 4-5 times a day  Call office Monday or Tuesday if condition has not improved

## 2020-08-11 RX ORDER — LEVOTHYROXINE SODIUM 112 UG/1
112 TABLET ORAL DAILY
Qty: 90 TABLET | Refills: 1 | Status: SHIPPED | OUTPATIENT
Start: 2020-08-11 | End: 2021-02-18

## 2020-08-20 ENCOUNTER — TELEPHONE (OUTPATIENT)
Dept: FAMILY MEDICINE CLINIC | Facility: CLINIC | Age: 70
End: 2020-08-20

## 2020-08-20 DIAGNOSIS — Z12.31 ENCOUNTER FOR SCREENING MAMMOGRAM FOR BREAST CANCER: Primary | ICD-10-CM

## 2020-08-20 NOTE — TELEPHONE ENCOUNTER
Please place referral for patients screening mammo to be completed at [facility].

## 2020-09-06 RX ORDER — ACETAMINOPHEN 160 MG
TABLET,DISINTEGRATING ORAL
Qty: 180 CAPSULE | Refills: 1 | Status: SHIPPED | OUTPATIENT
Start: 2020-09-06 | End: 2021-04-29

## 2020-09-10 ENCOUNTER — HOSPITAL ENCOUNTER (OUTPATIENT)
Dept: MAMMOGRAPHY | Facility: HOSPITAL | Age: 70
Discharge: HOME | End: 2020-09-10
Admitting: NURSE PRACTITIONER

## 2020-09-10 DIAGNOSIS — Z12.31 ENCOUNTER FOR SCREENING MAMMOGRAM FOR BREAST CANCER: ICD-10-CM

## 2020-09-10 PROCEDURE — 77063 BREAST TOMOSYNTHESIS BI: CPT

## 2020-09-10 PROCEDURE — 77067 SCR MAMMO BI INCL CAD: CPT

## 2020-09-14 RX ORDER — ATENOLOL 50 MG/1
TABLET ORAL
Qty: 90 TABLET | Refills: 1 | Status: SHIPPED | OUTPATIENT
Start: 2020-09-14 | End: 2021-05-03 | Stop reason: SDUPTHER

## 2020-09-29 RX ORDER — LOSARTAN POTASSIUM 100 MG/1
TABLET ORAL
Qty: 90 TABLET | Refills: 2 | Status: SHIPPED | OUTPATIENT
Start: 2020-09-29 | End: 2021-06-23

## 2020-10-20 RX ORDER — SERTRALINE HYDROCHLORIDE 100 MG/1
100 TABLET, FILM COATED ORAL DAILY
Qty: 90 TABLET | Refills: 2 | Status: SHIPPED | OUTPATIENT
Start: 2020-10-20 | End: 2020-10-26

## 2020-10-20 RX ORDER — GEMFIBROZIL 600 MG/1
600 TABLET, FILM COATED ORAL 2 TIMES DAILY
Qty: 180 TABLET | Refills: 1 | Status: SHIPPED | OUTPATIENT
Start: 2020-10-20 | End: 2020-10-26

## 2020-10-20 RX ORDER — HYDROCHLOROTHIAZIDE 25 MG/1
12.5-25 TABLET ORAL DAILY
Qty: 90 TABLET | Refills: 2 | Status: SHIPPED | OUTPATIENT
Start: 2020-10-20 | End: 2020-10-26

## 2020-10-26 PROCEDURE — 87086 URINE CULTURE/COLONY COUNT: CPT

## 2020-10-26 PROCEDURE — U0003 INFECTIOUS AGENT DETECTION BY NUCLEIC ACID (DNA OR RNA); SEVERE ACUTE RESPIRATORY SYNDROME CORONAVIRUS 2 (SARS-COV-2) (CORONAVIRUS DISEASE [COVID-19]), AMPLIFIED PROBE TECHNIQUE, MAKING USE OF HIGH THROUGHPUT TECHNOLOGIES AS DESCRIBED BY CMS-2020-01-R: HCPCS

## 2020-11-16 ENCOUNTER — OFFICE VISIT (OUTPATIENT)
Dept: FAMILY MEDICINE CLINIC | Facility: CLINIC | Age: 70
End: 2020-11-16

## 2020-11-16 VITALS
HEIGHT: 64 IN | HEART RATE: 62 BPM | SYSTOLIC BLOOD PRESSURE: 112 MMHG | OXYGEN SATURATION: 98 % | TEMPERATURE: 96.8 F | WEIGHT: 151 LBS | BODY MASS INDEX: 25.78 KG/M2 | DIASTOLIC BLOOD PRESSURE: 68 MMHG

## 2020-11-16 DIAGNOSIS — R11.0 NAUSEA: ICD-10-CM

## 2020-11-16 DIAGNOSIS — Z00.00 PREVENTATIVE HEALTH CARE: ICD-10-CM

## 2020-11-16 DIAGNOSIS — E78.2 MIXED HYPERLIPIDEMIA: Primary | ICD-10-CM

## 2020-11-16 DIAGNOSIS — E03.9 ACQUIRED HYPOTHYROIDISM: ICD-10-CM

## 2020-11-16 PROCEDURE — 99213 OFFICE O/P EST LOW 20 MIN: CPT | Performed by: NURSE PRACTITIONER

## 2020-11-16 NOTE — PROGRESS NOTES
"    Shannon Dyson is a 70 y.o. female.     70-year-old white female with history of hypertension, hyperlipidemia, rheumatoid arthritis, fibrocystic breast disease, hypothyroidism, anxiety depression who comes in today for follow-up visit.  Couple months ago patient thinks she had Covid but went at the beginning of symptoms and had a negative test.  She states her symptoms have resolved now  Blood pressure 112/68 heart rate 62 she denies any chest pain, dyspnea, tachycardia or dizziness  Patient's main complaint is persistent nausea and burning especially at night.  I instructed her to elevate the head of her bed I am placing her on Pepcid and Carafate she is to let me know if symptoms do not resolve weight is down 7 pounds at 151 with a BMI of 25.9 we discussed diet and weight loss  Patient is up-to-date on preventative    Blood work today  Pepcid 20 mg twice daily  Carafate 1 g twice daily       The following portions of the patient's history were reviewed and updated as appropriate: allergies, current medications, past family history, past medical history, past social history, past surgical history and problem list.    Vitals:    11/16/20 0804   BP: 112/68   BP Location: Right arm   Patient Position: Sitting   Cuff Size: Adult   Pulse: 62   Temp: 96.8 °F (36 °C)   TempSrc: Temporal   SpO2: 98%   Weight: 68.5 kg (151 lb)   Height: 162.6 cm (64\")     Body mass index is 25.92 kg/m².    Past Medical History:   Diagnosis Date   • Anxiety    • Breast cyst    • Fibrocystic breast    • GERD (gastroesophageal reflux disease)    • Hyperlipidemia    • Hypertension    • Hypothyroidism      Past Surgical History:   Procedure Laterality Date   • APPENDECTOMY     • BREAST BIOPSY     • BREAST SURGERY     • HYSTERECTOMY       Family History   Problem Relation Age of Onset   • Heart disease Mother    • Arthritis Mother    • Heart disease Father    • Arthritis Father      Immunization History   Administered Date(s) Administered "   • Flulaval/Fluarix/Fluzone Quad 11/02/2019   • Fluzone High Dose =>65 Years ([facility] ONLY) 09/28/2019, 11/02/2019   • H1N1 Inj Preservative Free 11/19/2009   • Influenza Quad Vaccine (Inpatient) 10/08/2015   • Pneumococcal Conjugate 13-Valent (PCV13) 10/02/2019, 10/06/2020   • Shingrix 10/02/2019, 10/06/2020   • Zostavax 10/28/2016       Admission on 10/26/2020, Discharged on 10/26/2020   Component Date Value Ref Range Status   • Color 10/26/2020 Yellow  Yellow, Straw, Dark Yellow, [place] Final   • Clarity, UA 10/26/2020 Clear  Clear Final   • Specific Gravity  10/26/2020 1.015  1.005 - 1.030 Final   • pH, Urine 10/26/2020 6.5  5.0 - 8.0 Final   • Leukocytes 10/26/2020 Small (1+)* Negative Final   • Nitrite, UA 10/26/2020 Negative  Negative Final   • Protein, POC 10/26/2020 Negative  Negative mg/dL Final   • Glucose, UA 10/26/2020 Negative  Negative, 1000 mg/dL (3+) mg/dL Final   • Ketones, UA 10/26/2020 Negative  Negative Final   • Urobilinogen, UA 10/26/2020 Normal  Normal Final   • Bilirubin 10/26/2020 Negative  Negative Final   • Blood, UA 10/26/2020 Negative  Negative Final   • Urine Culture 10/26/2020 No growth   Final   • SARS-CoV-2, LENCHO 10/26/2020 Not Detected  Not Detected Final    Comment: This nucleic acid amplification test was developed and its performance  characteristics determined by [facility]. Nucleic acid  amplification tests include PCR and TMA. This test has not been FDA  cleared or approved. This test has been authorized by FDA under an  Emergency Use Authorization (EUA). This test is only authorized for  the duration of time the declaration that circumstances exist  justifying the authorization of the emergency use of in vitro  diagnostic tests for detection of SARS-CoV-2 virus and/or diagnosis  of COVID-19 infection under section 564(b)(1) of the Act, 21 U.S.C.  360bbb-3(b) (1), unless the authorization is terminated or revoked  sooner.  When diagnostic testing is negative, the  possibility of a false  negative result should be considered in the context of a patient's  recent exposures and the presence of clinical signs and symptoms  consistent with COVID-19. An individual without symptoms of COVID-19  and who is not shedding SARS-CoV-2 virus would                            expect to have a  negative (not detected) result in this assay.         Review of Systems   Constitutional: Negative.    HENT: Negative.    Respiratory: Negative.    Gastrointestinal: Positive for nausea.   Genitourinary: Negative.    Skin: Negative.    Neurological: Negative.    Psychiatric/Behavioral: Negative.        Objective   Physical Exam  Constitutional:       Appearance: Normal appearance.   Neck:      Musculoskeletal: Normal range of motion.   Cardiovascular:      Rate and Rhythm: Normal rate and regular rhythm.      Pulses: Normal pulses.      Heart sounds: Normal heart sounds.   Pulmonary:      Effort: Pulmonary effort is normal.      Breath sounds: Normal breath sounds.   Abdominal:      General: Bowel sounds are normal.   Musculoskeletal: Normal range of motion.   Skin:     General: Skin is warm and dry.   Neurological:      General: No focal deficit present.      Mental Status: She is alert and oriented to person, place, and time.   Psychiatric:         Mood and Affect: Mood normal.         Behavior: Behavior normal.         Procedures    Assessment/Plan   Diagnoses and all orders for this visit:    1. Mixed hyperlipidemia (Primary)  -     Lipid Panel With LDL / HDL Ratio    2. Acquired hypothyroidism  -     TSH+Free T4  -     T3    3. Preventative health care  -     CBC & Differential  -     Comprehensive Metabolic Panel    4. BMI 25.0-25.9,adult    5. Nausea          Current Outpatient Medications:   •  atenolol (TENORMIN) 50 MG tablet, TAKE 1 TABLET EVERY DAY, Disp: 90 tablet, Rfl: 1  •  Cholecalciferol (VITAMIN D3) 50 MCG (2000 UT) capsule, TAKE 2 CAPSULES EVERY DAY, Disp: 180 capsule, Rfl: 1  •   doxepin (SINEquan) 50 MG capsule, Take 1 capsule by mouth Daily., Disp: , Rfl:   •  levothyroxine (SYNTHROID, LEVOTHROID) 112 MCG tablet, Take 1 tablet by mouth Daily., Disp: 90 tablet, Rfl: 1  •  losartan (COZAAR) 100 MG tablet, TAKE 1 TABLET EVERY DAY, Disp: 90 tablet, Rfl: 2

## 2020-11-16 NOTE — PATIENT INSTRUCTIONS
Take Pepcid Carafate as directed call office if nausea does not resolve  Fasting blood work today  Follow-up 6 months

## 2020-11-17 ENCOUNTER — TELEPHONE (OUTPATIENT)
Dept: FAMILY MEDICINE CLINIC | Facility: CLINIC | Age: 70
End: 2020-11-17

## 2020-11-17 LAB
ALBUMIN SERPL-MCNC: 5 G/DL (ref 3.7–4.7)
ALBUMIN/GLOB SERPL: 1.7 {RATIO} (ref 1.2–2.2)
ALP SERPL-CCNC: 143 IU/L (ref 39–117)
ALT SERPL-CCNC: 12 IU/L (ref 0–32)
AST SERPL-CCNC: 23 IU/L (ref 0–40)
BASOPHILS # BLD AUTO: 0.1 X10E3/UL (ref 0–0.2)
BASOPHILS NFR BLD AUTO: 2 %
BILIRUB SERPL-MCNC: 0.3 MG/DL (ref 0–1.2)
BUN SERPL-MCNC: 43 MG/DL (ref 8–27)
BUN/CREAT SERPL: 44 (ref 12–28)
CALCIUM SERPL-MCNC: 10.9 MG/DL (ref 8.7–10.3)
CHLORIDE SERPL-SCNC: 107 MMOL/L (ref 96–106)
CHOLEST SERPL-MCNC: 278 MG/DL (ref 100–199)
CO2 SERPL-SCNC: 22 MMOL/L (ref 20–29)
CREAT SERPL-MCNC: 0.98 MG/DL (ref 0.57–1)
EOSINOPHIL # BLD AUTO: 0.4 X10E3/UL (ref 0–0.4)
EOSINOPHIL NFR BLD AUTO: 6 %
ERYTHROCYTE [DISTWIDTH] IN BLOOD BY AUTOMATED COUNT: 12.5 % (ref 11.7–15.4)
GLOBULIN SER CALC-MCNC: 3 G/DL (ref 1.5–4.5)
GLUCOSE SERPL-MCNC: 104 MG/DL (ref 65–99)
HCT VFR BLD AUTO: 38.9 % (ref 34–46.6)
HDLC SERPL-MCNC: 47 MG/DL
HGB BLD-MCNC: 13.2 G/DL (ref 11.1–15.9)
IMM GRANULOCYTES # BLD AUTO: 0 X10E3/UL (ref 0–0.1)
IMM GRANULOCYTES NFR BLD AUTO: 0 %
LDLC SERPL CALC-MCNC: 188 MG/DL (ref 0–99)
LDLC/HDLC SERPL: 4 RATIO (ref 0–3.2)
LYMPHOCYTES # BLD AUTO: 1.5 X10E3/UL (ref 0.7–3.1)
LYMPHOCYTES NFR BLD AUTO: 23 %
MCH RBC QN AUTO: 30.8 PG (ref 26.6–33)
MCHC RBC AUTO-ENTMCNC: 33.9 G/DL (ref 31.5–35.7)
MCV RBC AUTO: 91 FL (ref 79–97)
MONOCYTES # BLD AUTO: 0.5 X10E3/UL (ref 0.1–0.9)
MONOCYTES NFR BLD AUTO: 8 %
NEUTROPHILS # BLD AUTO: 3.9 X10E3/UL (ref 1.4–7)
NEUTROPHILS NFR BLD AUTO: 61 %
PLATELET # BLD AUTO: 308 X10E3/UL (ref 150–450)
POTASSIUM SERPL-SCNC: 3.9 MMOL/L (ref 3.5–5.2)
PROT SERPL-MCNC: 8 G/DL (ref 6–8.5)
RBC # BLD AUTO: 4.29 X10E6/UL (ref 3.77–5.28)
SODIUM SERPL-SCNC: 144 MMOL/L (ref 134–144)
T3 SERPL-MCNC: 50 NG/DL (ref 71–180)
T4 FREE SERPL-MCNC: 0.81 NG/DL (ref 0.82–1.77)
TRIGL SERPL-MCNC: 226 MG/DL (ref 0–149)
TSH SERPL DL<=0.005 MIU/L-ACNC: 0.65 UIU/ML (ref 0.45–4.5)
VLDLC SERPL CALC-MCNC: 43 MG/DL (ref 5–40)
WBC # BLD AUTO: 6.4 X10E3/UL (ref 3.4–10.8)

## 2020-11-17 RX ORDER — SUCRALFATE 1 G/1
1 TABLET ORAL 2 TIMES DAILY
Qty: 60 TABLET | Refills: 2 | Status: SHIPPED | OUTPATIENT
Start: 2020-11-17 | End: 2020-12-28 | Stop reason: SDUPTHER

## 2020-11-17 RX ORDER — FAMOTIDINE 20 MG/1
20 TABLET, FILM COATED ORAL 2 TIMES DAILY
Qty: 60 TABLET | Refills: 2 | Status: SHIPPED | OUTPATIENT
Start: 2020-11-17 | End: 2021-02-16

## 2020-11-17 NOTE — TELEPHONE ENCOUNTER
Patient is requesting prescriptions for two medications you spoke about at her appointment yesterday. The pharmacy told her Pepcid is much cheaper when prescribed so she would like that called in. Also, the medication Carafate is only available by prescription so she would like that called in as well.

## 2020-11-25 RX ORDER — PRAVASTATIN SODIUM 10 MG
10 TABLET ORAL DAILY
Qty: 30 TABLET | Refills: 2 | Status: SHIPPED | OUTPATIENT
Start: 2020-11-25 | End: 2020-11-25

## 2020-11-25 RX ORDER — PRAVASTATIN SODIUM 10 MG
10 TABLET ORAL DAILY
Qty: 90 TABLET | Refills: 1 | Status: SHIPPED | OUTPATIENT
Start: 2020-11-25 | End: 2021-11-16

## 2020-12-28 RX ORDER — SUCRALFATE 1 G/1
1 TABLET ORAL 2 TIMES DAILY
Qty: 180 TABLET | Refills: 1 | Status: SHIPPED | OUTPATIENT
Start: 2020-12-28 | End: 2021-08-11

## 2021-02-16 ENCOUNTER — OFFICE VISIT (OUTPATIENT)
Dept: FAMILY MEDICINE CLINIC | Facility: CLINIC | Age: 71
End: 2021-02-16

## 2021-02-16 VITALS
TEMPERATURE: 96.9 F | OXYGEN SATURATION: 99 % | HEIGHT: 64 IN | SYSTOLIC BLOOD PRESSURE: 110 MMHG | BODY MASS INDEX: 25.4 KG/M2 | HEART RATE: 59 BPM | WEIGHT: 148.8 LBS | DIASTOLIC BLOOD PRESSURE: 70 MMHG

## 2021-02-16 DIAGNOSIS — E78.2 MIXED HYPERLIPIDEMIA: Primary | ICD-10-CM

## 2021-02-16 DIAGNOSIS — R41.3 MEMORY LOSS: ICD-10-CM

## 2021-02-16 DIAGNOSIS — E03.9 ACQUIRED HYPOTHYROIDISM: ICD-10-CM

## 2021-02-16 DIAGNOSIS — F41.8 ANXIETY ASSOCIATED WITH DEPRESSION: ICD-10-CM

## 2021-02-16 DIAGNOSIS — R73.9 ELEVATED BLOOD SUGAR: ICD-10-CM

## 2021-02-16 DIAGNOSIS — M54.50 ACUTE RIGHT-SIDED LOW BACK PAIN WITHOUT SCIATICA: ICD-10-CM

## 2021-02-16 DIAGNOSIS — Z00.00 PREVENTATIVE HEALTH CARE: ICD-10-CM

## 2021-02-16 PROCEDURE — 99214 OFFICE O/P EST MOD 30 MIN: CPT | Performed by: NURSE PRACTITIONER

## 2021-02-16 RX ORDER — SERTRALINE HYDROCHLORIDE 100 MG/1
1 TABLET, FILM COATED ORAL DAILY
COMMUNITY
Start: 2021-01-18 | End: 2021-02-16

## 2021-02-16 RX ORDER — PREDNISONE 5 MG/1
TABLET ORAL
Qty: 20 TABLET | Refills: 0 | Status: SHIPPED | OUTPATIENT
Start: 2021-02-16 | End: 2021-02-24

## 2021-02-16 RX ORDER — CITALOPRAM 10 MG/1
10 TABLET ORAL DAILY
Qty: 30 TABLET | Refills: 2 | Status: SHIPPED | OUTPATIENT
Start: 2021-02-16 | End: 2021-08-11 | Stop reason: SDUPTHER

## 2021-02-16 RX ORDER — CYCLOBENZAPRINE HCL 10 MG
10 TABLET ORAL NIGHTLY PRN
Qty: 30 TABLET | Refills: 0 | Status: SHIPPED | OUTPATIENT
Start: 2021-02-16 | End: 2021-06-16 | Stop reason: SDUPTHER

## 2021-02-16 RX ORDER — HYDROCHLOROTHIAZIDE 25 MG/1
1 TABLET ORAL DAILY
COMMUNITY
Start: 2021-01-04 | End: 2021-03-03

## 2021-02-16 NOTE — PROGRESS NOTES
Shannon Dyson is a 70 y.o. female.     70-year-old white female with history of hypertension, hyperlipidemia, rheumatoid arthritis, fibrocystic breast disease, hypothyroidism, anxiety depression who comes in today for follow-up visit  Patient states she is having some worsening memory issues enough that her family members are concerned about her.  She states it started about 3 years ago but after she got Covid in October it got a lot worse.  She also does not want to eat and continues to lose weight.   states she sits in a chair all day during the day will not get up and do anything or go anywhere.  Patient has depression and has been on Zoloft for quite a while and does have a history of Alzheimer's in the family on her mother side.  I am going to take her off Zoloft place her on Celexa get a CT of the head and possible referral to neurology .  Patient's demeanor appeared depressed however she denies any suicidal or homicidal ideation  Patient also complains of low back pain for about 3 weeks and states she was doing some lifting 2 to 3 weeks ago that probably got it aggravated.  I am placing her on steroids muscle relaxers along with back stretching exercises heat and ice and patient is going to let me know if she has no improvement in 2 weeks  Patient's fasting blood sugars are always mildly elevated and I would be checking A1c with blood work [facility]  Blood pressure 110/70 heart rate 58 she denies any chest pain, dyspnea, tachycardia or dizziness  Weight is down 3 pounds of weight of 149 and BMI of 25.5    Fasting blood work  Prednisone 5 mg taper dose/Flexeril 10 mg 3 times daily  CT  Brain  Stop Zoloft and wean as discussed during exam  Celexa 10 mg daily  Possible neurology referral  Health shakes 2-3 times a day       The following portions of the patient's history were reviewed and updated as appropriate: allergies, current medications, past family history, past medical history, past social  "history, past surgical history and problem list.    Vitals:    02/16/21 1336   BP: 110/70   BP Location: Left arm   Patient Position: Sitting   Cuff Size: Adult   Pulse: 59   Temp: 96.9 °F (36.1 °C)   TempSrc: Temporal   SpO2: 99%   Weight: 67.5 kg (148 lb 12.8 oz)   Height: 162.6 cm (64\")     Body mass index is 25.54 kg/m².    Past Medical History:   Diagnosis Date   • Anxiety    • Breast cyst    • Fibrocystic breast    • GERD (gastroesophageal reflux disease)    • Hyperlipidemia    • Hypertension    • Hypothyroidism      Past Surgical History:   Procedure Laterality Date   • APPENDECTOMY     • BREAST BIOPSY     • BREAST SURGERY     • HYSTERECTOMY       Family History   Problem Relation Age of Onset   • Heart disease Mother    • Arthritis Mother    • Heart disease Father    • Arthritis Father      Immunization History   Administered Date(s) Administered   • Flulaval/Fluarix/Fluzone Quad 11/02/2019   • Fluzone High Dose =>65 Years ([facility] ONLY) 09/28/2019, 11/02/2019   • H1N1 Inj Preservative Free 11/19/2009   • Influenza Quad Vaccine (Inpatient) 10/08/2015   • Pneumococcal Conjugate 13-Valent (PCV13) 10/02/2019, 10/06/2020   • Shingrix 10/02/2019, 10/06/2020   • Zostavax 10/28/2016       Office Visit on 11/16/2020   Component Date Value Ref Range Status   • WBC 11/16/2020 6.4  3.4 - 10.8 x10E3/uL Final   • RBC 11/16/2020 4.29  3.77 - 5.28 x10E6/uL Final   • Hemoglobin 11/16/2020 13.2  11.1 - 15.9 g/dL Final   • Hematocrit 11/16/2020 38.9  34.0 - 46.6 % Final   • MCV 11/16/2020 91  79 - 97 fL Final   • MCH 11/16/2020 30.8  26.6 - 33.0 pg Final   • MCHC 11/16/2020 33.9  31.5 - 35.7 g/dL Final   • RDW 11/16/2020 12.5  11.7 - 15.4 % Final   • Platelets 11/16/2020 308  150 - 450 x10E3/uL Final   • Neutrophil Rel % 11/16/2020 61  Not Estab. % Final   • Lymphocyte Rel % 11/16/2020 23  Not Estab. % Final   • Monocyte Rel % 11/16/2020 8  Not Estab. % Final   • Eosinophil Rel % 11/16/2020 6  Not Estab. % Final   • Basophil " Rel % 11/16/2020 2  Not Estab. % Final   • Neutrophils Absolute 11/16/2020 3.9  1.4 - 7.0 x10E3/uL Final   • Lymphocytes Absolute 11/16/2020 1.5  0.7 - 3.1 x10E3/uL Final   • Monocytes Absolute 11/16/2020 0.5  0.1 - 0.9 x10E3/uL Final   • Eosinophils Absolute 11/16/2020 0.4  0.0 - 0.4 x10E3/uL Final   • Basophils Absolute 11/16/2020 0.1  0.0 - 0.2 x10E3/uL Final   • Immature Granulocyte Rel % 11/16/2020 0  Not Estab. % Final   • Immature Grans Absolute 11/16/2020 0.0  0.0 - 0.1 x10E3/uL Final   • Glucose 11/16/2020 104* 65 - 99 mg/dL Final   • BUN 11/16/2020 43* 8 - 27 mg/dL Final   • Creatinine 11/16/2020 0.98  0.57 - 1.00 mg/dL Final   • eGFR Non African Am 11/16/2020 58* >59 mL/min/1.73 Final   • eGFR African Am 11/16/2020 67  >59 mL/min/1.73 Final   • BUN/Creatinine Ratio 11/16/2020 44* 12 - 28 Final   • Sodium 11/16/2020 144  134 - 144 mmol/L Final   • Potassium 11/16/2020 3.9  3.5 - 5.2 mmol/L Final   • Chloride 11/16/2020 107* 96 - 106 mmol/L Final   • Total CO2 11/16/2020 22  20 - 29 mmol/L Final   • Calcium 11/16/2020 10.9* 8.7 - 10.3 mg/dL Final   • Total Protein 11/16/2020 8.0  6.0 - 8.5 g/dL Final   • Albumin 11/16/2020 5.0* 3.7 - 4.7 g/dL Final   • Globulin 11/16/2020 3.0  1.5 - 4.5 g/dL Final   • A/G Ratio 11/16/2020 1.7  1.2 - 2.2 Final   • Total Bilirubin 11/16/2020 0.3  0.0 - 1.2 mg/dL Final   • Alkaline Phosphatase 11/16/2020 143* 39 - 117 IU/L Final   • AST (SGOT) 11/16/2020 23  0 - 40 IU/L Final   • ALT (SGPT) 11/16/2020 12  0 - 32 IU/L Final   • Total Cholesterol 11/16/2020 278* 100 - 199 mg/dL Final   • Triglycerides 11/16/2020 226* 0 - 149 mg/dL Final   • HDL Cholesterol 11/16/2020 47  >39 mg/dL Final   • VLDL Cholesterol [place] 11/16/2020 43* 5 - 40 mg/dL Final   • LDL Chol Calc ([facility]) 11/16/2020 188* 0 - 99 mg/dL Final   • LDL/HDL RATIO 11/16/2020 4.0* 0.0 - 3.2 ratio Final    Comment:                                     LDL/HDL Ratio                                              Men  Women                                 1/2 Avg.Risk  1.0    1.5                                    Avg.Risk  3.6    3.2                                 2X Avg.Risk  6.2    5.0                                 3X Avg.Risk  8.0    6.1     • TSH 11/16/2020 0.645  0.450 - 4.500 uIU/mL Final   • Free T4 11/16/2020 0.81* 0.82 - 1.77 ng/dL Final   • T3, Total 11/16/2020 50* 71 - 180 ng/dL Final         Review of Systems   Constitutional: Positive for fatigue.   HENT: Negative.    Respiratory: Negative.    Cardiovascular: Negative.    Gastrointestinal: Negative.    Genitourinary: Negative.    Musculoskeletal: Positive for back pain.   Skin: Negative.    Neurological: Positive for memory problem.   Psychiatric/Behavioral: Positive for dysphoric mood.       Objective   Physical Exam  Constitutional:       Appearance: Normal appearance.   Neck:      Musculoskeletal: Normal range of motion.   Cardiovascular:      Rate and Rhythm: Normal rate and regular rhythm.      Pulses: Normal pulses.      Heart sounds: Normal heart sounds.   Pulmonary:      Effort: Pulmonary effort is normal.      Breath sounds: Normal breath sounds.   Abdominal:      General: Bowel sounds are normal.   Musculoskeletal: Normal range of motion.   Skin:     General: Skin is warm and dry.   Neurological:      Mental Status: She is alert.      Comments: Complains of worsening memory loss not remembering doing things   Psychiatric:      Comments: Patient told demeanor is flat and appears depressed         Procedures    Assessment/Plan   Problems Addressed this Visit        Cardiac and Vasculature    Hyperlipidemia - Primary    Relevant Orders    Lipid Panel With LDL / HDL Ratio       Endocrine and Metabolic    Hypothyroidism    Relevant Medications    predniSONE 5 MG (48) tablet therapy pack dose pack    Other Relevant Orders    TSH+Free T4    T3       Mental Health    Anxiety associated with depression    Relevant Medications    citalopram (CeleXA) 10 MG tablet        Musculoskeletal and Injuries    Low back pain      Other Visit Diagnoses     Elevated blood sugar        Relevant Orders    Comprehensive Metabolic Panel    Hemoglobin A1c    Preventative health care        Relevant Orders    CBC & Differential    Memory loss        Relevant Orders    CT Head Without Contrast    BMI 25.0-25.9,adult          Diagnoses       Codes Comments    Mixed hyperlipidemia    -  Primary ICD-10-CM: E78.2  ICD-9-CM: 272.2     Acquired hypothyroidism     ICD-10-CM: E03.9  ICD-9-CM: 244.9     Elevated blood sugar     ICD-10-CM: R73.9  ICD-9-CM: 790.29     Preventative health care     ICD-10-CM: Z00.00  ICD-9-CM: V70.0     Memory loss     ICD-10-CM: R41.3  ICD-9-CM: 780.93     Anxiety associated with depression     ICD-10-CM: F41.8  ICD-9-CM: 300.4     Acute right-sided low back pain without sciatica     ICD-10-CM: M54.5  ICD-9-CM: 724.2     BMI 25.0-25.9,adult     ICD-10-CM: Z68.25  ICD-9-CM: V85.21             Current Outpatient Medications:   •  atenolol (TENORMIN) 50 MG tablet, TAKE 1 TABLET EVERY DAY, Disp: 90 tablet, Rfl: 1  •  Cholecalciferol (VITAMIN D3) 50 MCG (2000 UT) capsule, TAKE 2 CAPSULES EVERY DAY, Disp: 180 capsule, Rfl: 1  •  doxepin (SINEquan) 50 MG capsule, Take 1 capsule by mouth Daily., Disp: , Rfl:   •  hydroCHLOROthiazide (HYDRODIURIL) 25 MG tablet, Take 1 tablet by mouth Daily., Disp: , Rfl:   •  levothyroxine (SYNTHROID, LEVOTHROID) 112 MCG tablet, Take 1 tablet by mouth Daily., Disp: 90 tablet, Rfl: 1  •  losartan (COZAAR) 100 MG tablet, TAKE 1 TABLET EVERY DAY, Disp: 90 tablet, Rfl: 2  •  citalopram (CeleXA) 10 MG tablet, Take 1 tablet by mouth Daily., Disp: 30 tablet, Rfl: 2  •  cyclobenzaprine (FLEXERIL) 10 MG tablet, Take 1 tablet by mouth At Night As Needed for Muscle Spasms., Disp: 30 tablet, Rfl: 0  •  pravastatin (PRAVACHOL) 10 MG tablet, TAKE 1 TABLET BY MOUTH DAILY, Disp: 90 tablet, Rfl: 1  •  predniSONE 5 MG (48) tablet therapy pack dose pack, Take 4 tablets by  mouth Daily for 2 days, THEN 3 tablets Daily for 2 days, THEN 2 tablets Daily for 2 days, THEN 1 tablet Daily for 2 days., Disp: 20 tablet, Rfl: 0  •  sucralfate (Carafate) 1 g tablet, Take 1 tablet by mouth 2 (Two) Times a Day., Disp: 180 tablet, Rfl: 1

## 2021-02-16 NOTE — PATIENT INSTRUCTIONS
Take steroids and muscle relaxers as directed  Ice heat back stretching exercises  Schedule appointment for blood work  After you have finished back medications  wean off Zoloft as directed and start taking dyslexia  Keep appointment for CAT scan  Possible neurology referral  Health shakes to 3 times a day

## 2021-02-17 ENCOUNTER — TELEPHONE (OUTPATIENT)
Dept: FAMILY MEDICINE CLINIC | Facility: CLINIC | Age: 71
End: 2021-02-17

## 2021-02-17 DIAGNOSIS — Z00.00 PREVENTATIVE HEALTH CARE: ICD-10-CM

## 2021-02-17 DIAGNOSIS — E03.9 ACQUIRED HYPOTHYROIDISM: ICD-10-CM

## 2021-02-17 DIAGNOSIS — R73.9 ELEVATED BLOOD SUGAR: ICD-10-CM

## 2021-02-17 DIAGNOSIS — E78.2 MIXED HYPERLIPIDEMIA: ICD-10-CM

## 2021-02-17 NOTE — TELEPHONE ENCOUNTER
SPOKE WITH PATIENT LET HER KNOW WE HAVE TO GET APPROVAL FROM THE INSURANCE FIRST AND THAN WILL CALL TO SCHEDULE IT AND WILL LET HER KNOW THE DAY AND TIME. VOICED SHE UNDERSTOOD.

## 2021-02-17 NOTE — TELEPHONE ENCOUNTER
Shannon Dyson ([facility]) [phone] (H)     PATIENT CALLED AND WANTED TO KNOW ABOUT CT SCAN FOR HEAD     PATIENT CANNOT GET [facility] TO WORK, AND NEEDS A CALL BACK WITH APPT TIMES/DATE     PLEASE ADVISE

## 2021-02-18 DIAGNOSIS — E87.6 HYPOKALEMIA: Primary | ICD-10-CM

## 2021-02-18 LAB
ALBUMIN SERPL-MCNC: 4.9 G/DL (ref 3.7–4.7)
ALBUMIN/GLOB SERPL: 1.7 {RATIO} (ref 1.2–2.2)
ALP SERPL-CCNC: 150 IU/L (ref 39–117)
ALT SERPL-CCNC: 13 IU/L (ref 0–32)
AST SERPL-CCNC: 26 IU/L (ref 0–40)
BASOPHILS # BLD AUTO: 0.1 X10E3/UL (ref 0–0.2)
BASOPHILS NFR BLD AUTO: 2 %
BILIRUB SERPL-MCNC: 0.3 MG/DL (ref 0–1.2)
BUN SERPL-MCNC: 50 MG/DL (ref 8–27)
BUN/CREAT SERPL: 43 (ref 12–28)
CALCIUM SERPL-MCNC: 10.5 MG/DL (ref 8.7–10.3)
CHLORIDE SERPL-SCNC: 108 MMOL/L (ref 96–106)
CHOLEST SERPL-MCNC: 269 MG/DL (ref 100–199)
CO2 SERPL-SCNC: 18 MMOL/L (ref 20–29)
CREAT SERPL-MCNC: 1.16 MG/DL (ref 0.57–1)
EOSINOPHIL # BLD AUTO: 0.5 X10E3/UL (ref 0–0.4)
EOSINOPHIL NFR BLD AUTO: 9 %
ERYTHROCYTE [DISTWIDTH] IN BLOOD BY AUTOMATED COUNT: 12.8 % (ref 11.7–15.4)
GLOBULIN SER CALC-MCNC: 2.9 G/DL (ref 1.5–4.5)
GLUCOSE SERPL-MCNC: 95 MG/DL (ref 65–99)
HBA1C MFR BLD: 5.4 % (ref 4.8–5.6)
HCT VFR BLD AUTO: 37.4 % (ref 34–46.6)
HDLC SERPL-MCNC: 44 MG/DL
HGB BLD-MCNC: 13 G/DL (ref 11.1–15.9)
IMM GRANULOCYTES # BLD AUTO: 0 X10E3/UL (ref 0–0.1)
IMM GRANULOCYTES NFR BLD AUTO: 0 %
LDLC SERPL CALC-MCNC: 187 MG/DL (ref 0–99)
LDLC/HDLC SERPL: 4.3 RATIO (ref 0–3.2)
LYMPHOCYTES # BLD AUTO: 1.2 X10E3/UL (ref 0.7–3.1)
LYMPHOCYTES NFR BLD AUTO: 21 %
MCH RBC QN AUTO: 31.7 PG (ref 26.6–33)
MCHC RBC AUTO-ENTMCNC: 34.8 G/DL (ref 31.5–35.7)
MCV RBC AUTO: 91 FL (ref 79–97)
MONOCYTES # BLD AUTO: 0.5 X10E3/UL (ref 0.1–0.9)
MONOCYTES NFR BLD AUTO: 8 %
NEUTROPHILS # BLD AUTO: 3.3 X10E3/UL (ref 1.4–7)
NEUTROPHILS NFR BLD AUTO: 60 %
PLATELET # BLD AUTO: 285 X10E3/UL (ref 150–450)
POTASSIUM SERPL-SCNC: 3.2 MMOL/L (ref 3.5–5.2)
PROT SERPL-MCNC: 7.8 G/DL (ref 6–8.5)
RBC # BLD AUTO: 4.1 X10E6/UL (ref 3.77–5.28)
SODIUM SERPL-SCNC: 143 MMOL/L (ref 134–144)
T3 SERPL-MCNC: 55 NG/DL (ref 71–180)
T4 FREE SERPL-MCNC: 0.95 NG/DL (ref 0.82–1.77)
TRIGL SERPL-MCNC: 199 MG/DL (ref 0–149)
TSH SERPL DL<=0.005 MIU/L-ACNC: 0.23 UIU/ML (ref 0.45–4.5)
VLDLC SERPL CALC-MCNC: 38 MG/DL (ref 5–40)
WBC # BLD AUTO: 5.6 X10E3/UL (ref 3.4–10.8)

## 2021-02-18 RX ORDER — LEVOTHYROXINE SODIUM 0.1 MG/1
100 TABLET ORAL DAILY
Qty: 90 TABLET | Refills: 1 | Status: SHIPPED | OUTPATIENT
Start: 2021-02-18 | End: 2021-03-03

## 2021-02-18 RX ORDER — LEVOTHYROXINE SODIUM 0.1 MG/1
100 TABLET ORAL DAILY
Qty: 30 TABLET | Refills: 2 | Status: CANCELLED | OUTPATIENT
Start: 2021-02-18

## 2021-02-18 RX ORDER — LEVOTHYROXINE SODIUM 0.1 MG/1
100 TABLET ORAL DAILY
Qty: 30 TABLET | Refills: 2 | Status: SHIPPED | OUTPATIENT
Start: 2021-02-18 | End: 2021-02-18 | Stop reason: SDUPTHER

## 2021-02-18 RX ORDER — POTASSIUM CHLORIDE 20 MEQ/1
40 TABLET, EXTENDED RELEASE ORAL DAILY
Qty: 4 TABLET | Refills: 0 | Status: SHIPPED | OUTPATIENT
Start: 2021-02-18 | End: 2021-08-09

## 2021-02-18 NOTE — TELEPHONE ENCOUNTER
Pt now requesting a 90 day supply of Levothyroxine be sent to [facility] in [place].

## 2021-03-02 ENCOUNTER — HOSPITAL ENCOUNTER (EMERGENCY)
Facility: HOSPITAL | Age: 71
Discharge: HOME | End: 2021-03-02
Attending: EMERGENCY MEDICINE | Admitting: EMERGENCY MEDICINE

## 2021-03-02 ENCOUNTER — APPOINTMENT (OUTPATIENT)
Dept: GENERAL RADIOLOGY | Facility: HOSPITAL | Age: 71
End: 2021-03-02

## 2021-03-02 VITALS
DIASTOLIC BLOOD PRESSURE: 68 MMHG | BODY MASS INDEX: 24.95 KG/M2 | OXYGEN SATURATION: 96 % | RESPIRATION RATE: 16 BRPM | WEIGHT: 146.16 LBS | HEART RATE: 65 BPM | HEIGHT: 64 IN | TEMPERATURE: 98.2 F | SYSTOLIC BLOOD PRESSURE: 95 MMHG

## 2021-03-02 DIAGNOSIS — E87.6 HYPOKALEMIA: ICD-10-CM

## 2021-03-02 DIAGNOSIS — I95.9 HYPOTENSION, UNSPECIFIED HYPOTENSION TYPE: Primary | ICD-10-CM

## 2021-03-02 LAB
ALBUMIN SERPL-MCNC: 4.3 G/DL (ref 3.5–5.2)
ALBUMIN/GLOB SERPL: 1.2 G/DL
ALP SERPL-CCNC: 126 U/L (ref 39–117)
ALT SERPL W P-5'-P-CCNC: 8 U/L (ref 1–33)
ANION GAP SERPL CALCULATED.3IONS-SCNC: 14 MMOL/L (ref 5–15)
AST SERPL-CCNC: 15 U/L (ref 1–32)
BACTERIA UR QL AUTO: ABNORMAL /HPF
BASOPHILS # BLD AUTO: 0 10*3/MM3 (ref 0–0.2)
BASOPHILS NFR BLD AUTO: 0.2 % (ref 0–1.5)
BILIRUB SERPL-MCNC: 0.3 MG/DL (ref 0–1.2)
BILIRUB UR QL STRIP: NEGATIVE
BUN SERPL-MCNC: 55 MG/DL (ref 8–23)
BUN/CREAT SERPL: 54.5 (ref 7–25)
CALCIUM SPEC-SCNC: 10.5 MG/DL (ref 8.6–10.5)
CHLORIDE SERPL-SCNC: 105 MMOL/L (ref 98–107)
CLARITY UR: CLEAR
CO2 SERPL-SCNC: 19 MMOL/L (ref 22–29)
COLOR UR: YELLOW
CREAT SERPL-MCNC: 1.01 MG/DL (ref 0.57–1)
DEPRECATED RDW RBC AUTO: 42 FL (ref 37–54)
EOSINOPHIL # BLD AUTO: 0.4 10*3/MM3 (ref 0–0.4)
EOSINOPHIL NFR BLD AUTO: 5.7 % (ref 0.3–6.2)
ERYTHROCYTE [DISTWIDTH] IN BLOOD BY AUTOMATED COUNT: 13.2 % (ref 12.3–15.4)
GFR SERPL CREATININE-BSD FRML MDRD: 54 ML/MIN/1.73
GLOBULIN UR ELPH-MCNC: 3.5 GM/DL
GLUCOSE SERPL-MCNC: 91 MG/DL (ref 65–99)
GLUCOSE UR STRIP-MCNC: NEGATIVE MG/DL
HCT VFR BLD AUTO: 36.8 % (ref 34–46.6)
HGB BLD-MCNC: 12.7 G/DL (ref 12–15.9)
HGB UR QL STRIP.AUTO: NEGATIVE
HOLD SPECIMEN: NORMAL
HYALINE CASTS UR QL AUTO: ABNORMAL /LPF
KETONES UR QL STRIP: NEGATIVE
LEUKOCYTE ESTERASE UR QL STRIP.AUTO: ABNORMAL
LYMPHOCYTES # BLD AUTO: 1.6 10*3/MM3 (ref 0.7–3.1)
LYMPHOCYTES NFR BLD AUTO: 21.7 % (ref 19.6–45.3)
MAGNESIUM SERPL-MCNC: 2.2 MG/DL (ref 1.6–2.4)
MCH RBC QN AUTO: 31.5 PG (ref 26.6–33)
MCHC RBC AUTO-ENTMCNC: 34.3 G/DL (ref 31.5–35.7)
MCV RBC AUTO: 91.8 FL (ref 79–97)
MONOCYTES # BLD AUTO: 0.6 10*3/MM3 (ref 0.1–0.9)
MONOCYTES NFR BLD AUTO: 8.4 % (ref 5–12)
NEUTROPHILS NFR BLD AUTO: 4.6 10*3/MM3 (ref 1.7–7)
NEUTROPHILS NFR BLD AUTO: 64 % (ref 42.7–76)
NITRITE UR QL STRIP: NEGATIVE
NRBC BLD AUTO-RTO: 0 /100 WBC (ref 0–0.2)
PH UR STRIP.AUTO: 6.5 [PH] (ref 5–8)
PLATELET # BLD AUTO: 258 10*3/MM3 (ref 140–450)
PMV BLD AUTO: 8.4 FL (ref 6–12)
POTASSIUM SERPL-SCNC: 3 MMOL/L (ref 3.5–5.2)
PROT SERPL-MCNC: 7.8 G/DL (ref 6–8.5)
PROT UR QL STRIP: NEGATIVE
RBC # BLD AUTO: 4.01 10*6/MM3 (ref 3.77–5.28)
RBC # UR: ABNORMAL /HPF
REF LAB TEST METHOD: ABNORMAL
SODIUM SERPL-SCNC: 138 MMOL/L (ref 136–145)
SP GR UR STRIP: 1.01 (ref 1–1.03)
SQUAMOUS #/AREA URNS HPF: ABNORMAL /HPF
TSH SERPL DL<=0.05 MIU/L-ACNC: 0.02 UIU/ML (ref 0.27–4.2)
UROBILINOGEN UR QL STRIP: ABNORMAL
WBC # BLD AUTO: 7.2 10*3/MM3 (ref 3.4–10.8)
WBC UR QL AUTO: ABNORMAL /HPF
WHOLE BLOOD HOLD SPECIMEN: NORMAL

## 2021-03-02 PROCEDURE — 81001 URINALYSIS AUTO W/SCOPE: CPT | Performed by: EMERGENCY MEDICINE

## 2021-03-02 PROCEDURE — 93005 ELECTROCARDIOGRAM TRACING: CPT

## 2021-03-02 PROCEDURE — 99284 EMERGENCY DEPT VISIT MOD MDM: CPT

## 2021-03-02 PROCEDURE — 84443 ASSAY THYROID STIM HORMONE: CPT | Performed by: EMERGENCY MEDICINE

## 2021-03-02 PROCEDURE — 85025 COMPLETE CBC W/AUTO DIFF WBC: CPT | Performed by: EMERGENCY MEDICINE

## 2021-03-02 PROCEDURE — 80053 COMPREHEN METABOLIC PANEL: CPT | Performed by: EMERGENCY MEDICINE

## 2021-03-02 PROCEDURE — 93005 ELECTROCARDIOGRAM TRACING: CPT | Performed by: EMERGENCY MEDICINE

## 2021-03-02 PROCEDURE — 87086 URINE CULTURE/COLONY COUNT: CPT | Performed by: EMERGENCY MEDICINE

## 2021-03-02 PROCEDURE — 83735 ASSAY OF MAGNESIUM: CPT | Performed by: EMERGENCY MEDICINE

## 2021-03-02 PROCEDURE — 71045 X-RAY EXAM CHEST 1 VIEW: CPT

## 2021-03-02 RX ORDER — SODIUM CHLORIDE 0.9 % (FLUSH) 0.9 %
10 SYRINGE (ML) INJECTION AS NEEDED
Status: DISCONTINUED | OUTPATIENT
Start: 2021-03-02 | End: 2021-03-02 | Stop reason: HOSPADM

## 2021-03-02 RX ADMIN — SODIUM CHLORIDE, POTASSIUM CHLORIDE, SODIUM LACTATE AND CALCIUM CHLORIDE 1000 ML: 600; 310; 30; 20 INJECTION, SOLUTION INTRAVENOUS at 19:10

## 2021-03-02 RX ADMIN — SODIUM CHLORIDE 1000 ML: 9 INJECTION, SOLUTION INTRAVENOUS at 17:57

## 2021-03-02 NOTE — ED PROVIDER NOTES
Subjective   History of Present Illness  Low blood pressure  70-year-old female had her annual insurance physical exam today at her house and her blood pressure is low and she was told to come to the hospital for evaluation.  States she has had some weight loss and decreased oral intake over the last 6 months losing about 20 pounds.  She states she is still on her chronic antihypertensive regimen.  She reports no chest pain or abdominal pain or vomiting or shortness of breath.  She reports no headache or focal numbness or weakness.  She reports no fevers or chills  Review of Systems   Constitutional: Positive for appetite change and unexpected weight change. Negative for fever.   HENT: Negative.    Eyes: Negative.    Respiratory: Negative.    Cardiovascular: Negative.    Gastrointestinal: Negative.    Genitourinary: Negative.    Musculoskeletal: Negative.    Skin: Negative.    Neurological: Negative.    Psychiatric/Behavioral: Negative.        Past Medical History:   Diagnosis Date   • Anxiety    • Breast cyst    • Fibrocystic breast    • GERD (gastroesophageal reflux disease)    • Hyperlipidemia    • Hypertension    • Hypothyroidism        Allergies   Allergen Reactions   • Codeine Nausea And Vomiting   • Penicillin G Unknown (See Comments)   • Amlodipine Swelling       Past Surgical History:   Procedure Laterality Date   • APPENDECTOMY     • BREAST BIOPSY     • BREAST SURGERY     • HYSTERECTOMY         Family History   Problem Relation Age of Onset   • Heart disease Mother    • Arthritis Mother    • Heart disease Father    • Arthritis Father        Social History     Socioeconomic History   • Marital status:      Spouse name: Not on file   • Number of children: Not on file   • Years of education: Not on file   • Highest education level: Not on file   Tobacco Use   • Smoking status: Never Smoker   • Smokeless tobacco: Never Used   Substance and Sexual Activity   • Alcohol use: No     Frequency: Never   •  "Drug use: No   • Sexual activity: Defer     Prior to Admission medications    Medication Sig Start Date End Date Taking? Authorizing Provider   atenolol (TENORMIN) 50 MG tablet TAKE 1 TABLET EVERY DAY 9/14/20   Ana Paula Braun APRN   Cholecalciferol (VITAMIN D3) 50 MCG (2000 UT) capsule TAKE 2 CAPSULES EVERY DAY 9/6/20   Ana Paula Braun APRN   citalopram (CeleXA) 10 MG tablet Take 1 tablet by mouth Daily. 2/16/21   Ana Paula Braun APRN   cyclobenzaprine (FLEXERIL) 10 MG tablet Take 1 tablet by mouth At Night As Needed for Muscle Spasms. 2/16/21   Ana Paula Braun APRN   doxepin (SINEquan) 50 MG capsule Take 1 capsule by mouth Daily. 6/8/19   ProviderAddie MD   hydroCHLOROthiazide (HYDRODIURIL) 25 MG tablet Take 1 tablet by mouth Daily. 1/4/21   ProviderAddie MD   levothyroxine (Synthroid) 100 MCG tablet Take 1 tablet by mouth Daily. 2/18/21   Ana Paula Braun APRN   losartan (COZAAR) 100 MG tablet TAKE 1 TABLET EVERY DAY 9/29/20   Ana Paula Braun APRN   potassium chloride (K-DUR,KLOR-CON) 20 MEQ CR tablet Take 2 tablets by mouth Daily. 2/18/21   Ana Paula Braun APRN   pravastatin (PRAVACHOL) 10 MG tablet TAKE 1 TABLET BY MOUTH DAILY 11/25/20   Ana Paula Braun APRN   sucralfate (Carafate) 1 g tablet Take 1 tablet by mouth 2 (Two) Times a Day. 12/28/20   Ana Paula Braun APRN     BP 97/64   Pulse 68   Temp 98.1 °F (36.7 °C) (Oral)   Resp 14   Ht 162.6 cm (64\")   Wt 66.3 kg (146 lb 2.6 oz)   LMP  (LMP Unknown)   SpO2 93%   Breastfeeding No   BMI 25.09 kg/m²   I examined the patient using the appropriate personal protective equipment.          Objective   Physical Exam  General: Well-developed well-appearing, no acute distress, alert and appropriate  Eyes: Pupils round and normal, sclera nonicteric  HEENT: Mucous membranes somewhat dry, no mucosal swelling  Neck: Supple, no nuchal rigidity, no lymphadenopathy  Respirations: Respirations nonlabored, equal breath sounds bilaterally, clear " lungs  Heart regular rate and rhythm, no murmurs rubs or gallops,   Abdomen soft nontender nondistended, no hepatosplenomegaly, no hernia, no mass, normal bowel sounds, no CVA tenderness  Extremities no clubbing cyanosis or edema, calves are symmetric and nontender  Neuro cranial nerves grossly intact, no focal limb deficits  Psych oriented, pleasant affect  Skin no rash, brisk cap refill  Procedures           ED Course    My EKG interpretation sinus rhythm, rate of 66, no acute ST or T wave normality       Results for orders placed or performed during the hospital encounter of 03/02/21   Comprehensive Metabolic Panel    Specimen: Blood   Result Value Ref Range    Glucose 91 65 - 99 mg/dL    BUN 55 (H) 8 - 23 mg/dL    Creatinine 1.01 (H) 0.57 - 1.00 mg/dL    Sodium 138 136 - 145 mmol/L    Potassium 3.0 (L) 3.5 - 5.2 mmol/L    Chloride 105 98 - 107 mmol/L    CO2 19.0 (L) 22.0 - 29.0 mmol/L    Calcium 10.5 8.6 - 10.5 mg/dL    Total Protein 7.8 6.0 - 8.5 g/dL    Albumin 4.30 3.50 - 5.20 g/dL    ALT (SGPT) 8 1 - 33 U/L    AST (SGOT) 15 1 - 32 U/L    Alkaline Phosphatase 126 (H) 39 - 117 U/L    Total Bilirubin 0.3 0.0 - 1.2 mg/dL    eGFR Non African Amer 54 (L) >60 mL/min/1.73    Globulin 3.5 gm/dL    A/G Ratio 1.2 g/dL    BUN/Creatinine Ratio 54.5 (H) 7.0 - 25.0    Anion Gap 14.0 5.0 - 15.0 mmol/L   TSH    Specimen: Blood   Result Value Ref Range    TSH 0.022 (L) 0.270 - 4.200 uIU/mL   Magnesium    Specimen: Blood   Result Value Ref Range    Magnesium 2.2 1.6 - 2.4 mg/dL   Urinalysis With Culture If Indicated - Urine, Clean Catch    Specimen: Urine, Clean Catch   Result Value Ref Range    Color, UA Yellow Yellow, Straw    Appearance, UA Clear Clear    pH, UA 6.5 5.0 - 8.0    Specific Gravity, UA 1.012 1.005 - 1.030    Glucose, UA Negative Negative    Ketones, UA Negative Negative    Bilirubin, UA Negative Negative    Blood, UA Negative Negative    Protein, UA Negative Negative    Leuk Esterase, UA Small (1+) (A)  Negative    Nitrite, UA Negative Negative    Urobilinogen, UA 0.2 E.U./dL 0.2 - 1.0 E.U./dL   CBC Auto Differential    Specimen: Blood   Result Value Ref Range    WBC 7.20 3.40 - 10.80 10*3/mm3    RBC 4.01 3.77 - 5.28 10*6/mm3    Hemoglobin 12.7 12.0 - 15.9 g/dL    Hematocrit 36.8 34.0 - 46.6 %    MCV 91.8 79.0 - 97.0 fL    MCH 31.5 26.6 - 33.0 pg    MCHC 34.3 31.5 - 35.7 g/dL    RDW 13.2 12.3 - 15.4 %    RDW-SD 42.0 37.0 - 54.0 fl    MPV 8.4 6.0 - 12.0 fL    Platelets 258 140 - 450 10*3/mm3    Neutrophil % 64.0 42.7 - 76.0 %    Lymphocyte % 21.7 19.6 - 45.3 %    Monocyte % 8.4 5.0 - 12.0 %    Eosinophil % 5.7 0.3 - 6.2 %    Basophil % 0.2 0.0 - 1.5 %    Neutrophils, Absolute 4.60 1.70 - 7.00 10*3/mm3    Lymphocytes, Absolute 1.60 0.70 - 3.10 10*3/mm3    Monocytes, Absolute 0.60 0.10 - 0.90 10*3/mm3    Eosinophils, Absolute 0.40 0.00 - 0.40 10*3/mm3    Basophils, Absolute 0.00 0.00 - 0.20 10*3/mm3    nRBC 0.0 0.0 - 0.2 /100 WBC   Urinalysis, Microscopic Only - Urine, Clean Catch    Specimen: Urine, Clean Catch   Result Value Ref Range    RBC, UA None Seen None Seen /HPF    WBC, UA 6-12 (A) None Seen /HPF    Bacteria, UA None Seen None Seen /HPF    Squamous Epithelial Cells, UA None Seen None Seen, 0-2 /HPF    Hyaline Casts, UA 0-2 None Seen /LPF    Methodology Automated Microscopy    ECG 12 Lead   Result Value Ref Range    QT Interval 400 ms   Light Blue Top   Result Value Ref Range    Extra Tube hold for add-on    Gold Top - SST   Result Value Ref Range    Extra Tube Hold for add-ons.      Xr Chest 1 View    Result Date: 3/2/2021  1. No evidence of active disease.  Electronically Signed By-Iva Self MD On:3/2/2021 6:33 PM This report was finalized on [id number] by  Iva Self MD.                                    MDM  I reviewed patient's recent labs her renal insufficiency is unchanged.  She does have some hypokalemia and ordered lactated Ringer's for some potassium and fluid replacement.  She is  encouraged to stop her hydrochlorothiazide.  She does note about a 20 pound weight loss over the last 6 months and she needs to cut back on her antihypertensive regimen.  I think her hypotension is mainly iatrogenic.  She is advised of the lab findings including the low TSH which may need some adjustment in her thyroid replacement.  She does need to follow-up with her doctor over the next couple days for further management of her chronic conditions.  We also discussed the potential need for more expanded evaluation of her decreased appetite and weight loss over the last 6 months.  They are discharged good condition and given warning signs for return  Final diagnoses:   Hypotension, unspecified hypotension type   Hypokalemia            Bry Arvizu MD  03/02/21 1943

## 2021-03-03 ENCOUNTER — OFFICE VISIT (OUTPATIENT)
Dept: FAMILY MEDICINE CLINIC | Facility: CLINIC | Age: 71
End: 2021-03-03

## 2021-03-03 ENCOUNTER — TELEPHONE (OUTPATIENT)
Dept: FAMILY MEDICINE CLINIC | Facility: CLINIC | Age: 71
End: 2021-03-03

## 2021-03-03 VITALS
TEMPERATURE: 97.1 F | WEIGHT: 146.8 LBS | HEART RATE: 74 BPM | SYSTOLIC BLOOD PRESSURE: 114 MMHG | BODY MASS INDEX: 25.06 KG/M2 | HEIGHT: 64 IN | DIASTOLIC BLOOD PRESSURE: 73 MMHG | OXYGEN SATURATION: 97 %

## 2021-03-03 DIAGNOSIS — R63.4 WEIGHT LOSS, UNINTENTIONAL: ICD-10-CM

## 2021-03-03 DIAGNOSIS — F41.8 ANXIETY ASSOCIATED WITH DEPRESSION: ICD-10-CM

## 2021-03-03 DIAGNOSIS — R79.89 ELEVATED SERUM CREATININE: ICD-10-CM

## 2021-03-03 DIAGNOSIS — G30.9 ALZHEIMER'S DEMENTIA WITH BEHAVIORAL DISTURBANCE, UNSPECIFIED TIMING OF DEMENTIA ONSET: ICD-10-CM

## 2021-03-03 DIAGNOSIS — F02.81 ALZHEIMER'S DEMENTIA WITH BEHAVIORAL DISTURBANCE, UNSPECIFIED TIMING OF DEMENTIA ONSET: ICD-10-CM

## 2021-03-03 DIAGNOSIS — I95.9 HYPOTENSION, UNSPECIFIED HYPOTENSION TYPE: ICD-10-CM

## 2021-03-03 DIAGNOSIS — E03.9 ACQUIRED HYPOTHYROIDISM: Primary | ICD-10-CM

## 2021-03-03 LAB — BACTERIA SPEC AEROBE CULT: NO GROWTH

## 2021-03-03 PROCEDURE — 99214 OFFICE O/P EST MOD 30 MIN: CPT | Performed by: NURSE PRACTITIONER

## 2021-03-03 RX ORDER — LEVOTHYROXINE SODIUM 112 UG/1
112 TABLET ORAL DAILY
Qty: 30 TABLET | Refills: 2 | Status: SHIPPED | OUTPATIENT
Start: 2021-03-03 | End: 2021-03-04 | Stop reason: SDUPTHER

## 2021-03-03 NOTE — ED NOTES
Pt and family updated on pt lab work and possible plan of care. Pillow provided for comfort. UA sent to lab.      , YENNI Duarte  03/02/21 1227

## 2021-03-03 NOTE — PATIENT INSTRUCTIONS
Keep appointment with neurology  Stop hydrochlorothiazide  Monitor blood pressures with parameters given and call if they are running low  Eat more high-calorie fattening foods  Take increased dose of Synthroid as directed  Follow-up 1 month

## 2021-03-03 NOTE — TELEPHONE ENCOUNTER
SRIKANTH FROM [employer] (VENDOR FOR [employer]K ASSESSMENTS) CALLING TO GIVE PATIENTS MOST RECENT BLOOD PRESSURE READINGS FROM 3/2/2021:  SITTIN/54 PULSE: 80  STANDIN/44 PULSE: NOT DONE    SHE IS BEING TREATED FOR HYPERTENSION AND WAS RECOMMENDED TO GO TO ED ON 3/2/2021. PATIENT UNDERSTOOD RECOMMENDATIONS AND URGENCY. SRIKANTH WILL SEND TO  FOR FOLLOW UP WITH THE PATIENT.    SRIKANTH CAN BE REACHED [phone].

## 2021-03-03 NOTE — ED NOTES
Pt c/o hypotension for a few days. Pt states she has had loss of appetite and has only consumed water and chocolote milk the last 3 weeks. Daughter at bedside and concerned pt is dehydrated and decline in kidney function.      Prior, YENNI Duarte  03/02/21 0515

## 2021-03-03 NOTE — PROGRESS NOTES
Shannon Dyson is a 70 y.o. female.      70-year-old white female with history of hypertension, hyperlipidemia, rheumatoid arthritis, fibrocystic breast disease, hypothyroidism, anxiety depression who comes in today for a follow-up visit.  Patient was seen on February 16 having memory issues not wanting to eat depressed and crying.  Patient's stay at home all day refusing to go anywhere.  I did a CT of the head rule out dementia and it was normal.  Patient has continued to lose weight at a weight of 147 and a BMI 25.2 she has lost about 20 pounds in the last 2 months from not eating.  Patient denies any nausea or stomach upset she states she just does not feel like eating.  Had lengthy discussion on trying to get more fatty high carb foods in the house for when she does eat.  She is already on 2 antidepressants.  She does have a family history of Alzheimer's in the family unguinal refer her to neurology since nothing we try seems to be helping and patient is slowly progressively worse.   is very concerned.    She went to the ER a couple nights ago with hypotension probably due to weight loss.  Her blood pressure was 96/60 and she was symptomatic.  Blood pressure today is 114/72 which is her norm.  We have stopped her hydrochlorothiazide and I gave them parameters to use for blood pressure to let me know if it drops again and we will start reducing atenolol or losartan.  Patient has also developed mild renal insufficiency since starting the weight loss and I am monitoring that.  Her TSH in the hospital was a little bit low and she has been complaining of hair loss which also may be due to diet however I am going to increase her Synthroid to see if this helps with any of her symptoms  Patient's potassium was low in the ER but she was given IV potassium  During visit today patient very flat affect tearful not making good eye contact.  She is refusing any kind of counseling but has agreed to go to  "neurology.  If symptoms are not Alzheimer's she is in severe depression    Neurology referral  Stop hydrochlorothiazide  Monitor blood pressures with parameters given call office if they are running low  Eat more high-calorie fattening foods  Increase Synthroid to 112 MCG daily  Follow-up 1 month       The following portions of the patient's history were reviewed and updated as appropriate: allergies, current medications, past family history, past medical history, past social history, past surgical history and problem list.    Vitals:    03/03/21 1325   BP: 114/73   BP Location: Right arm   Patient Position: Sitting   Cuff Size: Adult   Pulse: 74   Temp: 97.1 °F (36.2 °C)   TempSrc: Temporal   SpO2: 97%   Weight: 66.6 kg (146 lb 12.8 oz)   Height: 162.6 cm (64\")     Body mass index is 25.2 kg/m².    Past Medical History:   Diagnosis Date   • Anxiety    • Breast cyst    • Fibrocystic breast    • GERD (gastroesophageal reflux disease)    • Hyperlipidemia    • Hypertension    • Hypothyroidism      Past Surgical History:   Procedure Laterality Date   • APPENDECTOMY     • BREAST BIOPSY     • BREAST SURGERY     • HYSTERECTOMY       Family History   Problem Relation Age of Onset   • Heart disease Mother    • Arthritis Mother    • Heart disease Father    • Arthritis Father      Immunization History   Administered Date(s) Administered   • Flulaval/Fluarix/Fluzone Quad 11/02/2019   • Fluzone High Dose =>65 Years ([employer] ONLY) 09/28/2019, 11/02/2019   • H1N1 Inj Preservative Free 11/19/2009   • Influenza Quad Vaccine (Inpatient) 10/08/2015   • Pneumococcal Conjugate 13-Valent (PCV13) 10/02/2019, 10/06/2020   • Shingrix 10/02/2019, 10/06/2020   • Zostavax 10/28/2016       Admission on 03/02/2021, Discharged on 03/02/2021   Component Date Value Ref Range Status   • QT Interval 03/02/2021 400  ms Preliminary   • Glucose 03/02/2021 91  65 - 99 mg/dL Final   • BUN 03/02/2021 55* 8 - 23 mg/dL Final   • Creatinine 03/02/2021 1.01* " 0.57 - 1.00 mg/dL Final   • Sodium 03/02/2021 138  136 - 145 mmol/L Final   • Potassium 03/02/2021 3.0* 3.5 - 5.2 mmol/L Final   • Chloride 03/02/2021 105  98 - 107 mmol/L Final   • CO2 03/02/2021 19.0* 22.0 - 29.0 mmol/L Final   • Calcium 03/02/2021 10.5  8.6 - 10.5 mg/dL Final   • Total Protein 03/02/2021 7.8  6.0 - 8.5 g/dL Final   • Albumin 03/02/2021 4.30  3.50 - 5.20 g/dL Final   • ALT (SGPT) 03/02/2021 8  1 - 33 U/L Final   • AST (SGOT) 03/02/2021 15  1 - 32 U/L Final   • Alkaline Phosphatase 03/02/2021 126* 39 - 117 U/L Final   • Total Bilirubin 03/02/2021 0.3  0.0 - 1.2 mg/dL Final   • eGFR Non African Amer 03/02/2021 54* >60 mL/min/1.73 Final   • Globulin 03/02/2021 3.5  gm/dL Final   • A/G Ratio 03/02/2021 1.2  g/dL Final   • BUN/Creatinine Ratio 03/02/2021 54.5* 7.0 - 25.0 Final   • Anion Gap 03/02/2021 14.0  5.0 - 15.0 mmol/L Final   • TSH 03/02/2021 0.022* 0.270 - 4.200 uIU/mL Final   • Magnesium 03/02/2021 2.2  1.6 - 2.4 mg/dL Final   • Color, UA 03/02/2021 Yellow  Yellow, Straw Final   • Appearance, UA 03/02/2021 Clear  Clear Final   • pH, UA 03/02/2021 6.5  5.0 - 8.0 Final   • Specific Gravity, UA 03/02/2021 1.012  1.005 - 1.030 Final   • Glucose, UA 03/02/2021 Negative  Negative Final   • Ketones, UA 03/02/2021 Negative  Negative Final   • Bilirubin, UA 03/02/2021 Negative  Negative Final   • Blood, UA 03/02/2021 Negative  Negative Final   • Protein, UA 03/02/2021 Negative  Negative Final   • Leuk Esterase, UA 03/02/2021 Small (1+)* Negative Final   • Nitrite, UA 03/02/2021 Negative  Negative Final   • Urobilinogen, UA 03/02/2021 0.2 E.U./dL  0.2 - 1.0 E.U./dL Final   • WBC 03/02/2021 7.20  3.40 - 10.80 10*3/mm3 Final   • RBC 03/02/2021 4.01  3.77 - 5.28 10*6/mm3 Final   • Hemoglobin 03/02/2021 12.7  12.0 - 15.9 g/dL Final   • Hematocrit 03/02/2021 36.8  34.0 - 46.6 % Final   • MCV 03/02/2021 91.8  79.0 - 97.0 fL Final   • MCH 03/02/2021 31.5  26.6 - 33.0 pg Final   • MCHC 03/02/2021 34.3  31.5 -  35.7 g/dL Final   • RDW 03/02/2021 13.2  12.3 - 15.4 % Final   • RDW-SD 03/02/2021 42.0  37.0 - 54.0 fl Final   • MPV 03/02/2021 8.4  6.0 - 12.0 fL Final   • Platelets 03/02/2021 258  140 - 450 10*3/mm3 Final   • Neutrophil % 03/02/2021 64.0  42.7 - 76.0 % Final   • Lymphocyte % 03/02/2021 21.7  19.6 - 45.3 % Final   • Monocyte % 03/02/2021 8.4  5.0 - 12.0 % Final   • Eosinophil % 03/02/2021 5.7  0.3 - 6.2 % Final   • Basophil % 03/02/2021 0.2  0.0 - 1.5 % Final   • Neutrophils, Absolute 03/02/2021 4.60  1.70 - 7.00 10*3/mm3 Final   • Lymphocytes, Absolute 03/02/2021 1.60  0.70 - 3.10 10*3/mm3 Final   • Monocytes, Absolute 03/02/2021 0.60  0.10 - 0.90 10*3/mm3 Final   • Eosinophils, Absolute 03/02/2021 0.40  0.00 - 0.40 10*3/mm3 Final   • Basophils, Absolute 03/02/2021 0.00  0.00 - 0.20 10*3/mm3 Final   • nRBC 03/02/2021 0.0  0.0 - 0.2 /100 WBC Final   • Extra Tube 03/02/2021 hold for add-on   Final    Auto resulted   • Extra Tube 03/02/2021 Hold for add-ons.   Final    Auto resulted.   • RBC, UA 03/02/2021 None Seen  None Seen /HPF Final   • WBC, UA 03/02/2021 6-12* None Seen /HPF Final   • Bacteria, UA 03/02/2021 None Seen  None Seen /HPF Final   • Squamous Epithelial Cells, UA 03/02/2021 None Seen  None Seen, 0-2 /HPF Final   • Hyaline Casts, UA 03/02/2021 0-2  None Seen /LPF Final   • Methodology 03/02/2021 Automated Microscopy   Final   • Urine Culture 03/02/2021 No growth   Preliminary         Review of Systems   Constitutional: Positive for fatigue.   HENT: Negative.    Respiratory: Negative.    Cardiovascular: Negative.    Gastrointestinal: Negative.    Genitourinary: Negative.    Musculoskeletal: Negative.    Skin: Negative.    Neurological: Positive for memory problem.   Psychiatric/Behavioral: Positive for depressed mood.       Objective   Physical Exam  Constitutional:       Appearance: Normal appearance.   HENT:      Head: Normocephalic.   Neck:      Musculoskeletal: Normal range of motion.    Cardiovascular:      Rate and Rhythm: Normal rate and regular rhythm.      Pulses: Normal pulses.      Heart sounds: Normal heart sounds.   Pulmonary:      Effort: Pulmonary effort is normal.      Breath sounds: Normal breath sounds.   Abdominal:      General: Bowel sounds are normal.   Musculoskeletal: Normal range of motion.   Skin:     General: Skin is warm and dry.   Neurological:      General: No focal deficit present.      Mental Status: She is alert.      Comments: Some memory issues, refusing to eat or leave the house   Psychiatric:         Mood and Affect: Mood normal.      Comments: Very flat affect during office visit with poor eye contact tearfulness         Procedures    Assessment/Plan   Diagnoses and all orders for this visit:    1. Acquired hypothyroidism (Primary)    2. Anxiety associated with depression    3. Hypotension, unspecified hypotension type    4. BMI 25.0-25.9,adult    5. Weight loss, unintentional    6. Elevated serum creatinine    7. Alzheimer's dementia with behavioral disturbance, unspecified timing of dementia onset (CMS/[facility])  -     Ambulatory Referral to Neurology    Other orders  -     levothyroxine (Synthroid) 112 MCG tablet; Take 1 tablet by mouth Daily.  Dispense: 30 tablet; Refill: 2          Current Outpatient Medications:   •  atenolol (TENORMIN) 50 MG tablet, TAKE 1 TABLET EVERY DAY, Disp: 90 tablet, Rfl: 1  •  Cholecalciferol (VITAMIN D3) 50 MCG (2000 UT) capsule, TAKE 2 CAPSULES EVERY DAY, Disp: 180 capsule, Rfl: 1  •  citalopram (CeleXA) 10 MG tablet, Take 1 tablet by mouth Daily., Disp: 30 tablet, Rfl: 2  •  cyclobenzaprine (FLEXERIL) 10 MG tablet, Take 1 tablet by mouth At Night As Needed for Muscle Spasms., Disp: 30 tablet, Rfl: 0  •  doxepin (SINEquan) 50 MG capsule, Take 1 capsule by mouth Daily., Disp: , Rfl:   •  losartan (COZAAR) 100 MG tablet, TAKE 1 TABLET EVERY DAY, Disp: 90 tablet, Rfl: 2  •  potassium chloride (K-DUR,KLOR-CON) 20 MEQ CR tablet, Take 2  tablets by mouth Daily., Disp: 4 tablet, Rfl: 0  •  pravastatin (PRAVACHOL) 10 MG tablet, TAKE 1 TABLET BY MOUTH DAILY, Disp: 90 tablet, Rfl: 1  •  sucralfate (Carafate) 1 g tablet, Take 1 tablet by mouth 2 (Two) Times a Day., Disp: 180 tablet, Rfl: 1  •  levothyroxine (Synthroid) 112 MCG tablet, Take 1 tablet by mouth Daily., Disp: 30 tablet, Rfl: 2  No current facility-administered medications for this visit.

## 2021-03-04 RX ORDER — LEVOTHYROXINE SODIUM 112 UG/1
112 TABLET ORAL DAILY
Qty: 30 TABLET | Refills: 2 | Status: SHIPPED | OUTPATIENT
Start: 2021-03-04 | End: 2021-03-10 | Stop reason: SDUPTHER

## 2021-03-04 NOTE — TELEPHONE ENCOUNTER
Caller: DysonShannon    Relationship: Self    Best call back number: [phone]     Medication needed:   Requested Prescriptions     Pending Prescriptions Disp Refills   • levothyroxine (Synthroid) 112 MCG tablet 30 tablet 2     Sig: Take 1 tablet by mouth Daily.       When do you need the refill by: 3-4-21    What details did the patient provide when requesting the medication: PATIENT STATED PHARMACY NEVER RECEIVED MEDICATION. PATIENT STATED [facility] WAS GOING TO SEND 2 PRESCRIPTIONS, ONE TO [facility] AND THE OTHER TO [facility]       Does the patient have less than a 3 day supply:  [x] Yes  [] No    What is the patient's preferred pharmacy: [id number] - [place], [place] - [address] AT [facility] & [facility] - [phone] [facility] [phone] FX

## 2021-03-05 DIAGNOSIS — R41.3 MEMORY LOSS: ICD-10-CM

## 2021-03-07 LAB — QT INTERVAL: 400 MS

## 2021-03-09 ENCOUNTER — OFFICE VISIT (OUTPATIENT)
Dept: FAMILY MEDICINE CLINIC | Facility: CLINIC | Age: 71
End: 2021-03-09

## 2021-03-09 VITALS
DIASTOLIC BLOOD PRESSURE: 80 MMHG | HEIGHT: 64 IN | SYSTOLIC BLOOD PRESSURE: 110 MMHG | HEART RATE: 61 BPM | BODY MASS INDEX: 25.78 KG/M2 | OXYGEN SATURATION: 99 % | WEIGHT: 151 LBS | TEMPERATURE: 97.1 F

## 2021-03-09 DIAGNOSIS — H61.23 BILATERAL IMPACTED CERUMEN: ICD-10-CM

## 2021-03-09 DIAGNOSIS — R63.0 LOSS OF APPETITE: ICD-10-CM

## 2021-03-09 DIAGNOSIS — R41.3 MEMORY LOSS: Primary | ICD-10-CM

## 2021-03-09 PROCEDURE — 69209 REMOVE IMPACTED EAR WAX UNI: CPT | Performed by: NURSE PRACTITIONER

## 2021-03-09 PROCEDURE — 99213 OFFICE O/P EST LOW 20 MIN: CPT | Performed by: NURSE PRACTITIONER

## 2021-03-09 NOTE — PATIENT INSTRUCTIONS
Keep appointment with neurology  Continue health shakes and trying to eat more fattening foods  Follow-up in 3 6 months

## 2021-03-09 NOTE — PROGRESS NOTES
"    Shannon Dyson is a 70 y.o. female.     70-year-old white female with history of hypertension, hyperlipidemia, rheumatoid arthritis, fibrocystic breast disease, hypothyroidism, anxiety depression recent loss of memory and appetite who has appointment coming up with neuro who comes in today to have ears irrigated. Ears were irrigated successfully without any abnormal findings  Blood pressure 110/80 heart rate 60 she denies any chest pain, dyspnea, tachycardia or dizziness  Patient has actually gained 4 pounds of weight of 151 and a BMI of 25.9 today she has been doing health shakes and trying to make herself eat more    Keep appointment with neurology  Continue health shakes and trying to eat more fattening foods  Follow-up 3 - 6 months       The following portions of the patient's history were reviewed and updated as appropriate: allergies, current medications, past family history, past medical history, past social history, past surgical history and problem list.    Vitals:    03/09/21 0931   BP: 110/80   BP Location: Right arm   Patient Position: Sitting   Cuff Size: Adult   Pulse: 61   Temp: 97.1 °F (36.2 °C)   TempSrc: Temporal   SpO2: 99%   Weight: 68.5 kg (151 lb)   Height: 162.6 cm (64\")     Body mass index is 25.92 kg/m².    Past Medical History:   Diagnosis Date   • Anxiety    • Breast cyst    • Fibrocystic breast    • GERD (gastroesophageal reflux disease)    • Hyperlipidemia    • Hypertension    • Hypothyroidism      Past Surgical History:   Procedure Laterality Date   • APPENDECTOMY     • BREAST BIOPSY     • BREAST SURGERY     • HYSTERECTOMY       Family History   Problem Relation Age of Onset   • Heart disease Mother    • Arthritis Mother    • Heart disease Father    • Arthritis Father      Immunization History   Administered Date(s) Administered   • Flulaval/Fluarix/Fluzone Quad 11/02/2019   • Fluzone High Dose =>65 Years ([employer] ONLY) 09/28/2019, 11/02/2019   • H1N1 Inj Preservative Free " 11/19/2009   • Influenza Quad Vaccine (Inpatient) 10/08/2015   • Pneumococcal Conjugate 13-Valent (PCV13) 10/02/2019, 10/06/2020   • Shingrix 10/02/2019, 10/06/2020   • Zostavax 10/28/2016       Admission on 03/02/2021, Discharged on 03/02/2021   Component Date Value Ref Range Status   • QT Interval 03/02/2021 400  ms Final   • Glucose 03/02/2021 91  65 - 99 mg/dL Final   • BUN 03/02/2021 55* 8 - 23 mg/dL Final   • Creatinine 03/02/2021 1.01* 0.57 - 1.00 mg/dL Final   • Sodium 03/02/2021 138  136 - 145 mmol/L Final   • Potassium 03/02/2021 3.0* 3.5 - 5.2 mmol/L Final   • Chloride 03/02/2021 105  98 - 107 mmol/L Final   • CO2 03/02/2021 19.0* 22.0 - 29.0 mmol/L Final   • Calcium 03/02/2021 10.5  8.6 - 10.5 mg/dL Final   • Total Protein 03/02/2021 7.8  6.0 - 8.5 g/dL Final   • Albumin 03/02/2021 4.30  3.50 - 5.20 g/dL Final   • ALT (SGPT) 03/02/2021 8  1 - 33 U/L Final   • AST (SGOT) 03/02/2021 15  1 - 32 U/L Final   • Alkaline Phosphatase 03/02/2021 126* 39 - 117 U/L Final   • Total Bilirubin 03/02/2021 0.3  0.0 - 1.2 mg/dL Final   • eGFR Non African Amer 03/02/2021 54* >60 mL/min/1.73 Final   • Globulin 03/02/2021 3.5  gm/dL Final   • A/G Ratio 03/02/2021 1.2  g/dL Final   • BUN/Creatinine Ratio 03/02/2021 54.5* 7.0 - 25.0 Final   • Anion Gap 03/02/2021 14.0  5.0 - 15.0 mmol/L Final   • TSH 03/02/2021 0.022* 0.270 - 4.200 uIU/mL Final   • Magnesium 03/02/2021 2.2  1.6 - 2.4 mg/dL Final   • Color, UA 03/02/2021 Yellow  Yellow, Straw Final   • Appearance, UA 03/02/2021 Clear  Clear Final   • pH, UA 03/02/2021 6.5  5.0 - 8.0 Final   • Specific Gravity, UA 03/02/2021 1.012  1.005 - 1.030 Final   • Glucose, UA 03/02/2021 Negative  Negative Final   • Ketones, UA 03/02/2021 Negative  Negative Final   • Bilirubin, UA 03/02/2021 Negative  Negative Final   • Blood, UA 03/02/2021 Negative  Negative Final   • Protein, UA 03/02/2021 Negative  Negative Final   • Leuk Esterase, UA 03/02/2021 Small (1+)* Negative Final   • Nitrite,  UA 03/02/2021 Negative  Negative Final   • Urobilinogen, UA 03/02/2021 0.2 E.U./dL  0.2 - 1.0 E.U./dL Final   • WBC 03/02/2021 7.20  3.40 - 10.80 10*3/mm3 Final   • RBC 03/02/2021 4.01  3.77 - 5.28 10*6/mm3 Final   • Hemoglobin 03/02/2021 12.7  12.0 - 15.9 g/dL Final   • Hematocrit 03/02/2021 36.8  34.0 - 46.6 % Final   • MCV 03/02/2021 91.8  79.0 - 97.0 fL Final   • MCH 03/02/2021 31.5  26.6 - 33.0 pg Final   • MCHC 03/02/2021 34.3  31.5 - 35.7 g/dL Final   • RDW 03/02/2021 13.2  12.3 - 15.4 % Final   • RDW-SD 03/02/2021 42.0  37.0 - 54.0 fl Final   • MPV 03/02/2021 8.4  6.0 - 12.0 fL Final   • Platelets 03/02/2021 258  140 - 450 10*3/mm3 Final   • Neutrophil % 03/02/2021 64.0  42.7 - 76.0 % Final   • Lymphocyte % 03/02/2021 21.7  19.6 - 45.3 % Final   • Monocyte % 03/02/2021 8.4  5.0 - 12.0 % Final   • Eosinophil % 03/02/2021 5.7  0.3 - 6.2 % Final   • Basophil % 03/02/2021 0.2  0.0 - 1.5 % Final   • Neutrophils, Absolute 03/02/2021 4.60  1.70 - 7.00 10*3/mm3 Final   • Lymphocytes, Absolute 03/02/2021 1.60  0.70 - 3.10 10*3/mm3 Final   • Monocytes, Absolute 03/02/2021 0.60  0.10 - 0.90 10*3/mm3 Final   • Eosinophils, Absolute 03/02/2021 0.40  0.00 - 0.40 10*3/mm3 Final   • Basophils, Absolute 03/02/2021 0.00  0.00 - 0.20 10*3/mm3 Final   • nRBC 03/02/2021 0.0  0.0 - 0.2 /100 WBC Final   • Extra Tube 03/02/2021 hold for add-on   Final    Auto resulted   • Extra Tube 03/02/2021 Hold for add-ons.   Final    Auto resulted.   • RBC, UA 03/02/2021 None Seen  None Seen /HPF Final   • WBC, UA 03/02/2021 6-12* None Seen /HPF Final   • Bacteria, UA 03/02/2021 None Seen  None Seen /HPF Final   • Squamous Epithelial Cells, UA 03/02/2021 None Seen  None Seen, 0-2 /HPF Final   • Hyaline Casts, UA 03/02/2021 0-2  None Seen /LPF Final   • Methodology 03/02/2021 Automated Microscopy   Final   • Urine Culture 03/02/2021 No growth   Final         Review of Systems   Constitutional: Negative.    HENT: Negative.    Respiratory:  Negative.    Cardiovascular: Negative.    Genitourinary: Negative.    Musculoskeletal: Negative.    Neurological: Negative.    Psychiatric/Behavioral: Negative.        Objective   Physical Exam  Constitutional:       Appearance: Normal appearance.   HENT:      Head: Normocephalic.      Ears:      Comments: Bilateral cerumen impaction  Cardiovascular:      Rate and Rhythm: Normal rate.      Pulses: Normal pulses.   Pulmonary:      Effort: Pulmonary effort is normal.      Breath sounds: Normal breath sounds.   Abdominal:      General: Bowel sounds are normal.   Musculoskeletal:         General: Normal range of motion.      Cervical back: Normal range of motion.   Skin:     General: Skin is warm and dry.   Neurological:      General: No focal deficit present.      Mental Status: She is alert.      Comments: Still having a lot of memory issues   Psychiatric:         Mood and Affect: Mood normal.         Behavior: Behavior normal.         Ear Cerumen Removal    Date/Time: 3/9/2021 10:04 AM  Performed by: Ana Paula Braun APRN  Authorized by: Ana Paula Braun APRN     Anesthesia:  Local Anesthetic: none  Location details: left ear and right ear  Patient tolerance: patient tolerated the procedure well with no immediate complications  Procedure type: irrigation   Sedation:  Patient sedated: no            Assessment/Plan   Diagnoses and all orders for this visit:    1. Memory loss (Primary)    2. Loss of appetite    3. Bilateral impacted cerumen    4. BMI 25.0-25.9,adult          Current Outpatient Medications:   •  atenolol (TENORMIN) 50 MG tablet, TAKE 1 TABLET EVERY DAY, Disp: 90 tablet, Rfl: 1  •  Cholecalciferol (VITAMIN D3) 50 MCG (2000 UT) capsule, TAKE 2 CAPSULES EVERY DAY, Disp: 180 capsule, Rfl: 1  •  citalopram (CeleXA) 10 MG tablet, Take 1 tablet by mouth Daily., Disp: 30 tablet, Rfl: 2  •  cyclobenzaprine (FLEXERIL) 10 MG tablet, Take 1 tablet by mouth At Night As Needed for Muscle Spasms., Disp: 30 tablet, Rfl:  0  •  doxepin (SINEquan) 50 MG capsule, Take 1 capsule by mouth Daily., Disp: , Rfl:   •  levothyroxine (Synthroid) 112 MCG tablet, Take 1 tablet by mouth Daily., Disp: 30 tablet, Rfl: 2  •  losartan (COZAAR) 100 MG tablet, TAKE 1 TABLET EVERY DAY, Disp: 90 tablet, Rfl: 2  •  potassium chloride (K-DUR,KLOR-CON) 20 MEQ CR tablet, Take 2 tablets by mouth Daily., Disp: 4 tablet, Rfl: 0  •  pravastatin (PRAVACHOL) 10 MG tablet, TAKE 1 TABLET BY MOUTH DAILY, Disp: 90 tablet, Rfl: 1  •  sucralfate (Carafate) 1 g tablet, Take 1 tablet by mouth 2 (Two) Times a Day., Disp: 180 tablet, Rfl: 1

## 2021-03-11 RX ORDER — LEVOTHYROXINE SODIUM 112 UG/1
112 TABLET ORAL DAILY
Qty: 90 TABLET | Refills: 1 | Status: SHIPPED | OUTPATIENT
Start: 2021-03-11 | End: 2021-08-12

## 2021-04-05 ENCOUNTER — OFFICE VISIT (OUTPATIENT)
Dept: FAMILY MEDICINE CLINIC | Facility: CLINIC | Age: 71
End: 2021-04-05

## 2021-04-05 ENCOUNTER — TELEPHONE (OUTPATIENT)
Dept: FAMILY MEDICINE CLINIC | Facility: CLINIC | Age: 71
End: 2021-04-05

## 2021-04-05 VITALS
WEIGHT: 152 LBS | HEIGHT: 64 IN | TEMPERATURE: 96.9 F | DIASTOLIC BLOOD PRESSURE: 80 MMHG | HEART RATE: 70 BPM | SYSTOLIC BLOOD PRESSURE: 136 MMHG | BODY MASS INDEX: 25.95 KG/M2 | OXYGEN SATURATION: 96 %

## 2021-04-05 DIAGNOSIS — M54.41 ACUTE RIGHT-SIDED LOW BACK PAIN WITH RIGHT-SIDED SCIATICA: Primary | ICD-10-CM

## 2021-04-05 DIAGNOSIS — R41.3 MEMORY LOSS: ICD-10-CM

## 2021-04-05 LAB
BILIRUB BLD-MCNC: NEGATIVE MG/DL
CLARITY, POC: CLEAR
COLOR UR: YELLOW
GLUCOSE UR STRIP-MCNC: NEGATIVE MG/DL
KETONES UR QL: NEGATIVE
LEUKOCYTE EST, POC: NEGATIVE
NITRITE UR-MCNC: NEGATIVE MG/ML
PH UR: 6 [PH] (ref 5–8)
PROT UR STRIP-MCNC: NEGATIVE MG/DL
RBC # UR STRIP: NEGATIVE /UL
SP GR UR: 1.01 (ref 1–1.03)
UROBILINOGEN UR QL: NORMAL

## 2021-04-05 PROCEDURE — 99213 OFFICE O/P EST LOW 20 MIN: CPT | Performed by: NURSE PRACTITIONER

## 2021-04-05 PROCEDURE — 81003 URINALYSIS AUTO W/O SCOPE: CPT | Performed by: NURSE PRACTITIONER

## 2021-04-05 RX ORDER — PREDNISONE 5 MG/1
TABLET ORAL
Qty: 20 TABLET | Refills: 0 | Status: SHIPPED | OUTPATIENT
Start: 2021-04-05 | End: 2021-04-13

## 2021-04-05 RX ORDER — CYCLOBENZAPRINE HCL 10 MG
10 TABLET ORAL NIGHTLY PRN
Qty: 30 TABLET | Refills: 2 | Status: SHIPPED | OUTPATIENT
Start: 2021-04-05 | End: 2021-09-14

## 2021-04-05 NOTE — PROGRESS NOTES
"    Shannon Dyson is a 70 y.o. female.     70-year-old white female with history hypertension, hyperlipidemia, rheumatoid arthritis, fibrocystic breast disease, hypothyroidism, anxiety depression, worsening memory loss and appetite who comes in today with complaints of low back pain with radiculopathy down right lower extremity.  She denies any overuse or injury.  I am placing her on steroids muscle relaxers and ibuprofen along with heat ice and rest and she is to let me know if back does not improve.  Will be x-ray and lumbar and thoracic today.  Urinalysis was negative   Patient has still not heard from neurology for an appointment we have called the office and asked them once again to call patient  blood pressure 136/80 heart rate 70 she denies any chest pain, dyspnea, tachycardia or dizziness  Weight is 152 with a BMI of 26.1    Prednisone 5 mg taper dose  Home Flexeril and ibuprofen  Ice heat and rest  Urinalysis           The following portions of the patient's history were reviewed and updated as appropriate: allergies, current medications, past family history, past medical history, past social history, past surgical history and problem list.    Vitals:    04/05/21 0929   BP: 136/80   BP Location: Right arm   Patient Position: Sitting   Cuff Size: Adult   Pulse: 70   Temp: 96.9 °F (36.1 °C)   TempSrc: Temporal   SpO2: 96%   Weight: 68.9 kg (152 lb)   Height: 162.6 cm (64\")     Body mass index is 26.09 kg/m².    Past Medical History:   Diagnosis Date   • Anxiety    • Breast cyst    • Fibrocystic breast    • GERD (gastroesophageal reflux disease)    • Hyperlipidemia    • Hypertension    • Hypothyroidism      Past Surgical History:   Procedure Laterality Date   • APPENDECTOMY     • BREAST BIOPSY     • BREAST SURGERY     • HYSTERECTOMY       Family History   Problem Relation Age of Onset   • Heart disease Mother    • Arthritis Mother    • Heart disease Father    • Arthritis Father      Immunization History "   Administered Date(s) Administered   • COVID-19 (MODERNA) 01/29/2021, 02/26/2021   • Flulaval/Fluarix/Fluzone Quad 11/02/2019   • Fluzone High Dose =>65 Years ([employer] ONLY) 09/28/2019, 11/02/2019   • H1N1 Inj Preservative Free 11/19/2009   • Influenza Quad Vaccine (Inpatient) 10/08/2015   • Pneumococcal Conjugate 13-Valent (PCV13) 10/02/2019, 10/06/2020   • Shingrix 10/02/2019, 10/06/2020   • Zostavax 10/28/2016       Admission on 03/02/2021, Discharged on 03/02/2021   Component Date Value Ref Range Status   • QT Interval 03/02/2021 400  ms Final   • Glucose 03/02/2021 91  65 - 99 mg/dL Final   • BUN 03/02/2021 55* 8 - 23 mg/dL Final   • Creatinine 03/02/2021 1.01* 0.57 - 1.00 mg/dL Final   • Sodium 03/02/2021 138  136 - 145 mmol/L Final   • Potassium 03/02/2021 3.0* 3.5 - 5.2 mmol/L Final   • Chloride 03/02/2021 105  98 - 107 mmol/L Final   • CO2 03/02/2021 19.0* 22.0 - 29.0 mmol/L Final   • Calcium 03/02/2021 10.5  8.6 - 10.5 mg/dL Final   • Total Protein 03/02/2021 7.8  6.0 - 8.5 g/dL Final   • Albumin 03/02/2021 4.30  3.50 - 5.20 g/dL Final   • ALT (SGPT) 03/02/2021 8  1 - 33 U/L Final   • AST (SGOT) 03/02/2021 15  1 - 32 U/L Final   • Alkaline Phosphatase 03/02/2021 126* 39 - 117 U/L Final   • Total Bilirubin 03/02/2021 0.3  0.0 - 1.2 mg/dL Final   • eGFR Non African Amer 03/02/2021 54* >60 mL/min/1.73 Final   • Globulin 03/02/2021 3.5  gm/dL Final   • A/G Ratio 03/02/2021 1.2  g/dL Final   • BUN/Creatinine Ratio 03/02/2021 54.5* 7.0 - 25.0 Final   • Anion Gap 03/02/2021 14.0  5.0 - 15.0 mmol/L Final   • TSH 03/02/2021 0.022* 0.270 - 4.200 uIU/mL Final   • Magnesium 03/02/2021 2.2  1.6 - 2.4 mg/dL Final   • Color, UA 03/02/2021 Yellow  Yellow, Straw Final   • Appearance, UA 03/02/2021 Clear  Clear Final   • pH, UA 03/02/2021 6.5  5.0 - 8.0 Final   • Specific Gravity, UA 03/02/2021 1.012  1.005 - 1.030 Final   • Glucose, UA 03/02/2021 Negative  Negative Final   • Ketones, UA 03/02/2021 Negative  Negative Final    • Bilirubin, UA 03/02/2021 Negative  Negative Final   • Blood, UA 03/02/2021 Negative  Negative Final   • Protein, UA 03/02/2021 Negative  Negative Final   • Leuk Esterase, UA 03/02/2021 Small (1+)* Negative Final   • Nitrite, UA 03/02/2021 Negative  Negative Final   • Urobilinogen, UA 03/02/2021 0.2 E.U./dL  0.2 - 1.0 E.U./dL Final   • WBC 03/02/2021 7.20  3.40 - 10.80 10*3/mm3 Final   • RBC 03/02/2021 4.01  3.77 - 5.28 10*6/mm3 Final   • Hemoglobin 03/02/2021 12.7  12.0 - 15.9 g/dL Final   • Hematocrit 03/02/2021 36.8  34.0 - 46.6 % Final   • MCV 03/02/2021 91.8  79.0 - 97.0 fL Final   • MCH 03/02/2021 31.5  26.6 - 33.0 pg Final   • MCHC 03/02/2021 34.3  31.5 - 35.7 g/dL Final   • RDW 03/02/2021 13.2  12.3 - 15.4 % Final   • RDW-SD 03/02/2021 42.0  37.0 - 54.0 fl Final   • MPV 03/02/2021 8.4  6.0 - 12.0 fL Final   • Platelets 03/02/2021 258  140 - 450 10*3/mm3 Final   • Neutrophil % 03/02/2021 64.0  42.7 - 76.0 % Final   • Lymphocyte % 03/02/2021 21.7  19.6 - 45.3 % Final   • Monocyte % 03/02/2021 8.4  5.0 - 12.0 % Final   • Eosinophil % 03/02/2021 5.7  0.3 - 6.2 % Final   • Basophil % 03/02/2021 0.2  0.0 - 1.5 % Final   • Neutrophils, Absolute 03/02/2021 4.60  1.70 - 7.00 10*3/mm3 Final   • Lymphocytes, Absolute 03/02/2021 1.60  0.70 - 3.10 10*3/mm3 Final   • Monocytes, Absolute 03/02/2021 0.60  0.10 - 0.90 10*3/mm3 Final   • Eosinophils, Absolute 03/02/2021 0.40  0.00 - 0.40 10*3/mm3 Final   • Basophils, Absolute 03/02/2021 0.00  0.00 - 0.20 10*3/mm3 Final   • nRBC 03/02/2021 0.0  0.0 - 0.2 /100 WBC Final   • Extra Tube 03/02/2021 hold for add-on   Final    Auto resulted   • Extra Tube 03/02/2021 Hold for add-ons.   Final    Auto resulted.   • RBC, UA 03/02/2021 None Seen  None Seen /HPF Final   • WBC, UA 03/02/2021 6-12* None Seen /HPF Final   • Bacteria, UA 03/02/2021 None Seen  None Seen /HPF Final   • Squamous Epithelial Cells, UA 03/02/2021 None Seen  None Seen, 0-2 /HPF Final   • Hyaline Casts, UA  03/02/2021 0-2  None Seen /LPF Final   • Methodology 03/02/2021 Automated Microscopy   Final   • Urine Culture 03/02/2021 No growth   Final         Review of Systems   Constitutional: Negative.    HENT: Negative.    Respiratory: Negative.    Cardiovascular: Negative.    Gastrointestinal: Negative.    Genitourinary: Negative.    Musculoskeletal: Positive for back pain.   Skin: Negative.    Neurological: Positive for memory problem.   Psychiatric/Behavioral: Positive for decreased concentration and depressed mood.       Objective   Physical Exam  Constitutional:       Appearance: Normal appearance.   HENT:      Head: Normocephalic.   Cardiovascular:      Rate and Rhythm: Normal rate and regular rhythm.      Pulses: Normal pulses.      Heart sounds: Normal heart sounds.   Pulmonary:      Effort: Pulmonary effort is normal.      Breath sounds: Normal breath sounds.   Abdominal:      General: Bowel sounds are normal.   Musculoskeletal:      Cervical back: Normal range of motion.      Comments: Difficulty getting up and down out of chair   Skin:     General: Skin is warm and dry.   Neurological:      General: No focal deficit present.      Mental Status: She is alert.      Comments: Very forgetful cannot remember anything from  1 day to the next   Psychiatric:         Mood and Affect: Mood normal.         Behavior: Behavior normal.         Procedures    Assessment/Plan   Diagnoses and all orders for this visit:    1. Acute right-sided low back pain with right-sided sciatica (Primary)  -     XR Spine Thoracic 2 View  -     XR Spine Lumbar 2 or 3 View  -     POCT urinalysis dipstick, automated    2. Memory loss    Other orders  -     predniSONE 5 MG (48) tablet therapy pack dose pack; Take 4 tablets by mouth Daily for 2 days, THEN 3 tablets Daily for 2 days, THEN 2 tablets Daily for 2 days, THEN 1 tablet Daily for 2 days.  Dispense: 20 tablet; Refill: 0  -     cyclobenzaprine (FLEXERIL) 10 MG tablet; Take 1 tablet by mouth  At Night As Needed for Muscle Spasms.  Dispense: 30 tablet; Refill: 2          Current Outpatient Medications:   •  atenolol (TENORMIN) 50 MG tablet, TAKE 1 TABLET EVERY DAY, Disp: 90 tablet, Rfl: 1  •  Cholecalciferol (VITAMIN D3) 50 MCG (2000 UT) capsule, TAKE 2 CAPSULES EVERY DAY, Disp: 180 capsule, Rfl: 1  •  citalopram (CeleXA) 10 MG tablet, Take 1 tablet by mouth Daily., Disp: 30 tablet, Rfl: 2  •  cyclobenzaprine (FLEXERIL) 10 MG tablet, Take 1 tablet by mouth At Night As Needed for Muscle Spasms., Disp: 30 tablet, Rfl: 0  •  doxepin (SINEquan) 50 MG capsule, Take 1 capsule by mouth Daily., Disp: , Rfl:   •  levothyroxine (Synthroid) 112 MCG tablet, Take 1 tablet by mouth Daily., Disp: 90 tablet, Rfl: 1  •  losartan (COZAAR) 100 MG tablet, TAKE 1 TABLET EVERY DAY, Disp: 90 tablet, Rfl: 2  •  potassium chloride (K-DUR,KLOR-CON) 20 MEQ CR tablet, Take 2 tablets by mouth Daily., Disp: 4 tablet, Rfl: 0  •  pravastatin (PRAVACHOL) 10 MG tablet, TAKE 1 TABLET BY MOUTH DAILY, Disp: 90 tablet, Rfl: 1  •  sucralfate (Carafate) 1 g tablet, Take 1 tablet by mouth 2 (Two) Times a Day., Disp: 180 tablet, Rfl: 1  •  cyclobenzaprine (FLEXERIL) 10 MG tablet, Take 1 tablet by mouth At Night As Needed for Muscle Spasms., Disp: 30 tablet, Rfl: 2  •  predniSONE 5 MG (48) tablet therapy pack dose pack, Take 4 tablets by mouth Daily for 2 days, THEN 3 tablets Daily for 2 days, THEN 2 tablets Daily for 2 days, THEN 1 tablet Daily for 2 days., Disp: 20 tablet, Rfl: 0

## 2021-04-05 NOTE — TELEPHONE ENCOUNTER
Patient said she wanted either Therese or Ana Paula to call her, she has a question she forgot to ask Ana Paula before she left. I asked patient if there was anything I could pass along to ana paula and she would like either ana paula or therese call her  [phone]

## 2021-04-05 NOTE — TELEPHONE ENCOUNTER
The leg normally would have to be swollen and warm to touch what she describes sounds like radiculopathy from nerve due to problems and lower back

## 2021-04-05 NOTE — PATIENT INSTRUCTIONS
Take steroids anti-inflammatories muscle relaxers as directed monitor for drowsiness  Ice heat and rest at home  Keep appointment with neurology let office know if they do not call for an appointment

## 2021-04-05 NOTE — TELEPHONE ENCOUNTER
Called pt and she wants to know if she could have a blot clot in her leg since its only one leg bothering her.  SG

## 2021-04-29 RX ORDER — CHOLECALCIFEROL (VITAMIN D3) 50 MCG
CAPSULE ORAL
Qty: 180 CAPSULE | Refills: 1 | Status: SHIPPED | OUTPATIENT
Start: 2021-04-29 | End: 2021-11-05 | Stop reason: SDUPTHER

## 2021-05-03 ENCOUNTER — TELEPHONE (OUTPATIENT)
Dept: FAMILY MEDICINE CLINIC | Facility: CLINIC | Age: 71
End: 2021-05-03

## 2021-05-03 RX ORDER — GEMFIBROZIL 600 MG/1
600 TABLET, FILM COATED ORAL
Qty: 180 TABLET | Refills: 1 | Status: CANCELLED | OUTPATIENT
Start: 2021-05-03

## 2021-05-03 RX ORDER — ATENOLOL 50 MG/1
50 TABLET ORAL DAILY
Qty: 90 TABLET | Refills: 1 | Status: SHIPPED | OUTPATIENT
Start: 2021-05-03 | End: 2021-10-11

## 2021-05-03 RX ORDER — GEMFIBROZIL 600 MG/1
600 TABLET, FILM COATED ORAL
COMMUNITY
End: 2021-05-03 | Stop reason: SDUPTHER

## 2021-05-03 RX ORDER — GEMFIBROZIL 600 MG/1
600 TABLET, FILM COATED ORAL
Qty: 180 TABLET | Refills: 1 | Status: SHIPPED | OUTPATIENT
Start: 2021-05-03 | End: 2021-10-11

## 2021-05-03 NOTE — TELEPHONE ENCOUNTER
Caller: DysonShannon    Relationship: Self    Best call back number: [phone]    Medication needed:   Requested Prescriptions     Pending Prescriptions Disp Refills   • atenolol (TENORMIN) 50 MG tablet 90 tablet 1     Sig: Take 1 tablet by mouth Daily.       When do you need the refill by: ASAP        Does the patient have less than a 3 day supply:  [] Yes  [x] No    What is the patient's preferred pharmacy: [facility] - [facility] [address] - [phone]  - [phone]

## 2021-05-03 NOTE — TELEPHONE ENCOUNTER
Caller: Shannon Dyson    Relationship: Self    Best call back number: [phone]    What medication are you requesting: Gemfibrozil 600 MG    Have you had these symptoms before:    [x] Yes  [] No    Have you been treated for these symptoms before:   [x] Yes  [] No    If a prescription is needed, what is your preferred pharmacy and phone number:  [facility] - [facility] [address] - [phone] [facility] [phone]      Additional notes: PATIENT STATES THAT SHE IS NEEDING THIS MEDICINE REFILLED BUT IT WAS NOT ON HER MEDICATION LIST.  PLEASE CALL AND ADVISE IF UNABLE TO FILL.

## 2021-06-14 RX ORDER — HYDROCHLOROTHIAZIDE 25 MG/1
TABLET ORAL
Qty: 90 TABLET | Refills: 1 | Status: SHIPPED | OUTPATIENT
Start: 2021-06-14 | End: 2021-08-31 | Stop reason: SINTOL

## 2021-06-16 ENCOUNTER — OFFICE VISIT (OUTPATIENT)
Dept: FAMILY MEDICINE CLINIC | Facility: CLINIC | Age: 71
End: 2021-06-16

## 2021-06-16 VITALS
HEIGHT: 64 IN | DIASTOLIC BLOOD PRESSURE: 76 MMHG | TEMPERATURE: 97.6 F | BODY MASS INDEX: 25.61 KG/M2 | SYSTOLIC BLOOD PRESSURE: 133 MMHG | OXYGEN SATURATION: 100 % | WEIGHT: 150 LBS | HEART RATE: 76 BPM

## 2021-06-16 DIAGNOSIS — M79.604 LEG PAIN, LATERAL, RIGHT: Primary | ICD-10-CM

## 2021-06-16 PROCEDURE — 99213 OFFICE O/P EST LOW 20 MIN: CPT | Performed by: NURSE PRACTITIONER

## 2021-06-16 NOTE — PROGRESS NOTES
"    Shannon Dyson is a 70 y.o. female.     70-year-old white female with history of hypertension, hyperlipidemia, rheumatoid arthritis, fibrocystic breast disease, hypothyroidism, anxiety depression, worsening memory loss and appetite who comes in today with complaints of right lower leg pain.  Patient states a knot.  On the outside of her leg and her leg was aching for the last 2 days she states now the knot is going down and the pain has improved a great deal I suspect one of her valves has blown.  I instructed patient to elevate leg and let me know if pain does not resolve so we can rule out blood clot  Blood pressure 132/76 heart rate 76 she denies any chest pain, dyspnea, tachycardia or dizziness  Patient was here on April 4 for back pain states is much better now she is going to a chiropractor and I instructed her to let me know if pain gets worse we will try physical therapy    Elevate leg  Report if symptoms do not resolve  Report any worsening back pain       The following portions of the patient's history were reviewed and updated as appropriate: allergies, current medications, past family history, past medical history, past social history, past surgical history and problem list.    Vitals:    06/16/21 1429   BP: 133/76   BP Location: Right arm   Patient Position: Sitting   Cuff Size: Adult   Pulse: 76   Temp: 97.6 °F (36.4 °C)   TempSrc: Temporal   SpO2: 100%   Weight: 68 kg (150 lb)   Height: 162.6 cm (64\")     Body mass index is 25.75 kg/m².    Past Medical History:   Diagnosis Date   • Anxiety    • Breast cyst    • Fibrocystic breast    • GERD (gastroesophageal reflux disease)    • Hyperlipidemia    • Hypertension    • Hypothyroidism      Past Surgical History:   Procedure Laterality Date   • APPENDECTOMY     • BREAST BIOPSY     • BREAST SURGERY     • HYSTERECTOMY       Family History   Problem Relation Age of Onset   • Heart disease Mother    • Arthritis Mother    • Heart disease Father    • " Arthritis Father      Immunization History   Administered Date(s) Administered   • COVID-19 (MODERNA) 01/29/2021, 02/26/2021   • Flulaval/Fluarix/Fluzone Quad 11/02/2019   • Fluzone High Dose =>65 Years ([facility] ONLY) 09/28/2019, 11/02/2019   • H1N1 Inj Preservative Free 11/19/2009   • Influenza Quad Vaccine (Inpatient) 10/08/2015   • Pneumococcal Conjugate 13-Valent (PCV13) 10/02/2019, 10/06/2020   • Shingrix 10/02/2019, 10/06/2020   • Zostavax 10/28/2016       Office Visit on 04/05/2021   Component Date Value Ref Range Status   • Color 04/05/2021 Yellow  Yellow, Straw, Dark Yellow, [place] Final   • Clarity, UA 04/05/2021 Clear  Clear Final   • Specific Gravity  04/05/2021 1.015  1.005 - 1.030 Final   • pH, Urine 04/05/2021 6.0  5.0 - 8.0 Final   • Leukocytes 04/05/2021 Negative  Negative Final   • Nitrite, UA 04/05/2021 Negative  Negative Final   • Protein, POC 04/05/2021 Negative  Negative mg/dL Final   • Glucose, UA 04/05/2021 Negative  Negative, 1000 mg/dL (3+) mg/dL Final   • Ketones, UA 04/05/2021 Negative  Negative Final   • Urobilinogen, UA 04/05/2021 Normal  Normal Final   • Bilirubin 04/05/2021 Negative  Negative Final   • Blood, UA 04/05/2021 Negative  Negative Final         Review of Systems   Constitutional: Negative.    HENT: Negative.    Respiratory: Negative.    Cardiovascular: Negative.    Gastrointestinal: Negative.    Genitourinary: Negative.    Musculoskeletal:        Right leg pain   Skin: Negative.    Neurological: Negative.    Psychiatric/Behavioral: Negative.        Objective   Physical Exam  HENT:      Head: Normocephalic.   Cardiovascular:      Rate and Rhythm: Normal rate and regular rhythm.      Pulses: Normal pulses.      Heart sounds: Normal heart sounds.   Pulmonary:      Effort: Pulmonary effort is normal.      Breath sounds: Normal breath sounds.   Abdominal:      General: Bowel sounds are normal.   Musculoskeletal:      Comments: kNot noted on outside of the right cath but there is  no swelling of leg or warmth and pain has improved since swelling has gone down   Skin:     General: Skin is warm and dry.   Neurological:      General: No focal deficit present.      Mental Status: She is alert and oriented to person, place, and time.   Psychiatric:         Mood and Affect: Mood normal.         Behavior: Behavior normal.         Procedures    Assessment/Plan   Diagnoses and all orders for this visit:    1. Leg pain, lateral, right (Primary)          Current Outpatient Medications:   •  atenolol (TENORMIN) 50 MG tablet, Take 1 tablet by mouth Daily., Disp: 90 tablet, Rfl: 1  •  citalopram (CeleXA) 10 MG tablet, Take 1 tablet by mouth Daily., Disp: 30 tablet, Rfl: 2  •  cyclobenzaprine (FLEXERIL) 10 MG tablet, Take 1 tablet by mouth At Night As Needed for Muscle Spasms., Disp: 30 tablet, Rfl: 2  •  D3 Super Strength 50 MCG (2000 UT) capsule, TAKE 2 CAPSULES EVERY DAY, Disp: 180 capsule, Rfl: 1  •  doxepin (SINEquan) 50 MG capsule, Take 1 capsule by mouth Daily., Disp: , Rfl:   •  gemfibrozil (LOPID) 600 MG tablet, Take 1 tablet by mouth 2 (Two) Times a Day Before Meals., Disp: 180 tablet, Rfl: 1  •  hydroCHLOROthiazide (HYDRODIURIL) 25 MG tablet, TAKE 1/2 TO 1 TABLET EVERY DAY, Disp: 90 tablet, Rfl: 1  •  levothyroxine (Synthroid) 112 MCG tablet, Take 1 tablet by mouth Daily., Disp: 90 tablet, Rfl: 1  •  losartan (COZAAR) 100 MG tablet, TAKE 1 TABLET EVERY DAY, Disp: 90 tablet, Rfl: 2  •  potassium chloride (K-DUR,KLOR-CON) 20 MEQ CR tablet, Take 2 tablets by mouth Daily., Disp: 4 tablet, Rfl: 0  •  pravastatin (PRAVACHOL) 10 MG tablet, TAKE 1 TABLET BY MOUTH DAILY, Disp: 90 tablet, Rfl: 1  •  sucralfate (Carafate) 1 g tablet, Take 1 tablet by mouth 2 (Two) Times a Day., Disp: 180 tablet, Rfl: 1

## 2021-06-16 NOTE — PATIENT INSTRUCTIONS
Keep leg elevated much as possible if not does not totally resolve and pain does not stop call office  Report any worsening of back pain

## 2021-06-23 RX ORDER — LOSARTAN POTASSIUM 100 MG/1
TABLET ORAL
Qty: 90 TABLET | Refills: 2 | Status: SHIPPED | OUTPATIENT
Start: 2021-06-23 | End: 2021-11-16 | Stop reason: SDUPTHER

## 2021-06-24 ENCOUNTER — TELEPHONE (OUTPATIENT)
Dept: FAMILY MEDICINE CLINIC | Facility: CLINIC | Age: 71
End: 2021-06-24

## 2021-06-24 DIAGNOSIS — M79.604 RIGHT LEG PAIN: Primary | ICD-10-CM

## 2021-06-24 NOTE — TELEPHONE ENCOUNTER
PATIENT CALLED AND STATES SHE WAS SEEN A COUPLE DAYS AGO AND WAS TOLD TO CALL BACK IF THE PAIN IN HER RIGHT LEG DID NOT GET BETTER, AN MRI WOULD BE ORDERED. THE PAIN IS GETTING WORSE.     PLEASE CALL AND ADVISE -[phone]

## 2021-06-25 ENCOUNTER — APPOINTMENT (OUTPATIENT)
Dept: GENERAL RADIOLOGY | Facility: HOSPITAL | Age: 71
End: 2021-06-25

## 2021-06-25 ENCOUNTER — HOSPITAL ENCOUNTER (EMERGENCY)
Facility: HOSPITAL | Age: 71
Discharge: HOME | End: 2021-06-25
Admitting: EMERGENCY MEDICINE

## 2021-06-25 ENCOUNTER — APPOINTMENT (OUTPATIENT)
Dept: CARDIOLOGY | Facility: HOSPITAL | Age: 71
End: 2021-06-25

## 2021-06-25 VITALS
DIASTOLIC BLOOD PRESSURE: 80 MMHG | RESPIRATION RATE: 16 BRPM | HEART RATE: 60 BPM | WEIGHT: 150.8 LBS | HEIGHT: 64 IN | BODY MASS INDEX: 25.74 KG/M2 | OXYGEN SATURATION: 96 % | SYSTOLIC BLOOD PRESSURE: 129 MMHG | TEMPERATURE: 97.7 F

## 2021-06-25 DIAGNOSIS — M79.604 LOWER EXTREMITY PAIN, RIGHT: Primary | ICD-10-CM

## 2021-06-25 LAB
BH CV LOWER VASCULAR LEFT COMMON FEMORAL AUGMENT: NORMAL
BH CV LOWER VASCULAR LEFT COMMON FEMORAL COMPETENT: NORMAL
BH CV LOWER VASCULAR LEFT COMMON FEMORAL COMPRESS: NORMAL
BH CV LOWER VASCULAR LEFT COMMON FEMORAL PHASIC: NORMAL
BH CV LOWER VASCULAR LEFT COMMON FEMORAL SPONT: NORMAL
BH CV LOWER VASCULAR RIGHT COMMON FEMORAL AUGMENT: NORMAL
BH CV LOWER VASCULAR RIGHT COMMON FEMORAL COMPETENT: NORMAL
BH CV LOWER VASCULAR RIGHT COMMON FEMORAL COMPRESS: NORMAL
BH CV LOWER VASCULAR RIGHT COMMON FEMORAL PHASIC: NORMAL
BH CV LOWER VASCULAR RIGHT COMMON FEMORAL SPONT: NORMAL
BH CV LOWER VASCULAR RIGHT DISTAL FEMORAL COMPRESS: NORMAL
BH CV LOWER VASCULAR RIGHT GASTRONEMIUS COMPRESS: NORMAL
BH CV LOWER VASCULAR RIGHT GREATER SAPH AK COMPRESS: NORMAL
BH CV LOWER VASCULAR RIGHT GREATER SAPH BK COMPRESS: NORMAL
BH CV LOWER VASCULAR RIGHT LESSER SAPH COMPRESS: NORMAL
BH CV LOWER VASCULAR RIGHT MID FEMORAL AUGMENT: NORMAL
BH CV LOWER VASCULAR RIGHT MID FEMORAL COMPETENT: NORMAL
BH CV LOWER VASCULAR RIGHT MID FEMORAL COMPRESS: NORMAL
BH CV LOWER VASCULAR RIGHT MID FEMORAL PHASIC: NORMAL
BH CV LOWER VASCULAR RIGHT MID FEMORAL SPONT: NORMAL
BH CV LOWER VASCULAR RIGHT PERONEAL COMPRESS: NORMAL
BH CV LOWER VASCULAR RIGHT POPLITEAL AUGMENT: NORMAL
BH CV LOWER VASCULAR RIGHT POPLITEAL COMPETENT: NORMAL
BH CV LOWER VASCULAR RIGHT POPLITEAL COMPRESS: NORMAL
BH CV LOWER VASCULAR RIGHT POPLITEAL PHASIC: NORMAL
BH CV LOWER VASCULAR RIGHT POPLITEAL SPONT: NORMAL
BH CV LOWER VASCULAR RIGHT POSTERIOR TIBIAL COMPRESS: NORMAL
BH CV LOWER VASCULAR RIGHT PROXIMAL FEMORAL COMPRESS: NORMAL
BH CV LOWER VASCULAR RIGHT SAPHENOFEMORAL JUNCTION COMPRESS: NORMAL
MAXIMAL PREDICTED HEART RATE: 148 BPM
STRESS TARGET HR: 126 BPM

## 2021-06-25 PROCEDURE — 73562 X-RAY EXAM OF KNEE 3: CPT

## 2021-06-25 PROCEDURE — 93971 EXTREMITY STUDY: CPT

## 2021-06-25 PROCEDURE — 99283 EMERGENCY DEPT VISIT LOW MDM: CPT

## 2021-06-25 RX ORDER — HYDROCODONE BITARTRATE AND ACETAMINOPHEN 5; 325 MG/1; MG/1
1 TABLET ORAL ONCE
Status: COMPLETED | OUTPATIENT
Start: 2021-06-25 | End: 2021-06-25

## 2021-06-25 RX ADMIN — HYDROCODONE BITARTRATE AND ACETAMINOPHEN 1 TABLET: 5; 325 TABLET ORAL at 12:27

## 2021-07-07 ENCOUNTER — OFFICE VISIT (OUTPATIENT)
Dept: FAMILY MEDICINE CLINIC | Facility: CLINIC | Age: 71
End: 2021-07-07

## 2021-07-07 VITALS
HEART RATE: 78 BPM | TEMPERATURE: 97.8 F | OXYGEN SATURATION: 96 % | DIASTOLIC BLOOD PRESSURE: 82 MMHG | HEIGHT: 64 IN | WEIGHT: 151.2 LBS | SYSTOLIC BLOOD PRESSURE: 127 MMHG | BODY MASS INDEX: 25.81 KG/M2

## 2021-07-07 DIAGNOSIS — K06.8 PAIN IN GUMS: Primary | ICD-10-CM

## 2021-07-07 PROCEDURE — 99213 OFFICE O/P EST LOW 20 MIN: CPT | Performed by: NURSE PRACTITIONER

## 2021-07-07 RX ORDER — METRONIDAZOLE 500 MG/1
500 TABLET ORAL 3 TIMES DAILY
Qty: 20 TABLET | Refills: 0 | Status: SHIPPED | OUTPATIENT
Start: 2021-07-07 | End: 2021-08-11

## 2021-07-07 RX ORDER — TETRACYCLINE HYDROCHLORIDE 500 MG/1
500 CAPSULE ORAL 4 TIMES DAILY
Qty: 20 CAPSULE | Refills: 0 | Status: SHIPPED | OUTPATIENT
Start: 2021-07-07 | End: 2021-07-08

## 2021-07-07 NOTE — PROGRESS NOTES
"    Shannon Dyson is a 70 y.o. female.     70-year-old white female with history of hypertension, hyperlipidemia, rheumatoid arthritis, fibrocystic breast disease, hypothyroidism, anxiety depression, worsening memory loss and appetite who comes in today with complaints of severe abdominal pain for 1 week.  Patient has been to the dentist twice but they cannot figure out what is causing her gum pain.  On examination gums are slightly red but no swelling or ulcerations blisters noted.  Patient states she has not changed anything in her diet or toothpaste of brushes etc.  Dentist did put her on an antibiotic nystatin mouthwash but patient states that has not helped a great deal.  I will treat her with oral antibiotics along with oral gel and nystatin swish and swallow.  I told her if her condition does not improve I recommend she go see Dr. Whitehead and I gave her the phone number to make an appointment.  If there are any dentist in the area that could figure it out it would be her  Blood pressure stable 126/82 heart rate 78    Tetracycline 500 mg twice daily x10 days  Flagyl 500 mg 3 times daily x10 days  Nystatin swish and swallow 5 times a day  Over-the-counter oral material as needed for gum pain  Ibuprofen 400 mg 4 times a day  Possible referral to Dr. Whitehead       The following portions of the patient's history were reviewed and updated as appropriate: allergies, current medications, past family history, past medical history, past social history, past surgical history and problem list.    Vitals:    07/07/21 1400   BP: 127/82   BP Location: Right arm   Patient Position: Sitting   Cuff Size: Adult   Pulse: 78   Temp: 97.8 °F (36.6 °C)   TempSrc: Temporal   SpO2: 96%   Weight: 68.6 kg (151 lb 3.2 oz)   Height: 162.6 cm (64\")     Body mass index is 25.95 kg/m².    Past Medical History:   Diagnosis Date   • Anxiety    • Breast cyst    • Fibrocystic breast    • GERD (gastroesophageal reflux disease)    • " Hyperlipidemia    • Hypertension    • Hypothyroidism      Past Surgical History:   Procedure Laterality Date   • APPENDECTOMY     • BREAST BIOPSY     • BREAST SURGERY     • HYSTERECTOMY       Family History   Problem Relation Age of Onset   • Heart disease Mother    • Arthritis Mother    • Heart disease Father    • Arthritis Father      Immunization History   Administered Date(s) Administered   • COVID-19 (MODERNA) 01/29/2021, 02/26/2021   • Flulaval/Fluarix/Fluzone Quad 11/02/2019   • Fluzone High Dose =>65 Years ([facility] ONLY) 09/28/2019, 11/02/2019   • H1N1 Inj Preservative Free 11/19/2009   • Influenza Quad Vaccine (Inpatient) 10/08/2015   • Pneumococcal Conjugate 13-Valent (PCV13) 10/02/2019, 10/06/2020   • Shingrix 10/02/2019, 10/06/2020   • Zostavax 10/28/2016       Admission on 06/25/2021, Discharged on 06/25/2021   Component Date Value Ref Range Status   • Target HR (85%) 06/25/2021 126  bpm Final   • Max. Pred. HR (100%) 06/25/2021 148  bpm Final   • Right Common Femoral Spont 06/25/2021 Y   Final   • Right Common Femoral Phasic 06/25/2021 Y   Final   • Right Common Femoral Augment 06/25/2021 Y   Final   • Right Common Femoral Competent 06/25/2021 Y   Final   • Right Common Femoral Compress 06/25/2021 C   Final   • Right Saphenofemoral Junction Comp* 06/25/2021 C   Final   • Right Proximal Femoral Compress 06/25/2021 C   Final   • Right Mid Femoral Spont 06/25/2021 Y   Final   • Right Mid Femoral Phasic 06/25/2021 Y   Final   • Right Mid Femoral Augment 06/25/2021 Y   Final   • Right Mid Femoral Competent 06/25/2021 Y   Final   • Right Mid Femoral Compress 06/25/2021 C   Final   • Right Distal Femoral Compress 06/25/2021 C   Final   • Right Popliteal Spont 06/25/2021 Y   Final   • Right Popliteal Phasic 06/25/2021 Y   Final   • Right Popliteal Augment 06/25/2021 Y   Final   • Right Popliteal Competent 06/25/2021 Y   Final   • Right Popliteal Compress 06/25/2021 C   Final   • Right Posterior Tibial Compress  06/25/2021 C   Final   • Right Peroneal Compress 06/25/2021 C   Final   • Right GastronemiusSoleal Compress 06/25/2021 C   Final   • Right Greater Saph AK Compress 06/25/2021 C   Final   • Right Greater Saph BK Compress 06/25/2021 C   Final   • Right Lesser Saph Compress 06/25/2021 C   Final   • Left Common Femoral Spont 06/25/2021 Y   Final   • Left Common Femoral Phasic 06/25/2021 Y   Final   • Left Common Femoral Augment 06/25/2021 Y   Final   • Left Common Femoral Competent 06/25/2021 Y   Final   • Left Common Femoral Compress 06/25/2021 C   Final         Review of Systems   Constitutional: Negative.    HENT:        Gum pain   Respiratory: Negative.    Cardiovascular: Negative.    Gastrointestinal: Negative.    Genitourinary: Negative.    Musculoskeletal: Negative.    Skin: Negative.    Neurological: Negative.    Psychiatric/Behavioral: Negative.        Objective   Physical Exam  Constitutional:       Appearance: Normal appearance.   HENT:      Head: Normocephalic.      Mouth/Throat:      Comments: Gums are slightly red but no swelling blisters or abnormalities seen other than that  Cardiovascular:      Rate and Rhythm: Normal rate and regular rhythm.      Pulses: Normal pulses.      Heart sounds: Normal heart sounds.   Pulmonary:      Effort: Pulmonary effort is normal.      Breath sounds: Normal breath sounds.   Abdominal:      General: Bowel sounds are normal.   Musculoskeletal:         General: Normal range of motion.   Skin:     General: Skin is warm and dry.   Neurological:      General: No focal deficit present.      Mental Status: She is alert and oriented to person, place, and time.   Psychiatric:         Mood and Affect: Mood normal.         Behavior: Behavior normal.         Procedures    Assessment/Plan   Diagnoses and all orders for this visit:    1. Pain in gums (Primary)    Other orders  -     tetracycline (ACHROMYCIN,SUMYCIN) 500 MG capsule; Take 1 capsule by mouth 4 (Four) Times a Day.   Dispense: 20 capsule; Refill: 0  -     metroNIDAZOLE (Flagyl) 500 MG tablet; Take 1 tablet by mouth 3 (Three) Times a Day.  Dispense: 20 tablet; Refill: 0          Current Outpatient Medications:   •  atenolol (TENORMIN) 50 MG tablet, Take 1 tablet by mouth Daily., Disp: 90 tablet, Rfl: 1  •  citalopram (CeleXA) 10 MG tablet, Take 1 tablet by mouth Daily., Disp: 30 tablet, Rfl: 2  •  cyclobenzaprine (FLEXERIL) 10 MG tablet, Take 1 tablet by mouth At Night As Needed for Muscle Spasms., Disp: 30 tablet, Rfl: 2  •  D3 Super Strength 50 MCG (2000 UT) capsule, TAKE 2 CAPSULES EVERY DAY, Disp: 180 capsule, Rfl: 1  •  doxepin (SINEquan) 50 MG capsule, Take 1 capsule by mouth Daily., Disp: , Rfl:   •  gemfibrozil (LOPID) 600 MG tablet, Take 1 tablet by mouth 2 (Two) Times a Day Before Meals., Disp: 180 tablet, Rfl: 1  •  hydroCHLOROthiazide (HYDRODIURIL) 25 MG tablet, TAKE 1/2 TO 1 TABLET EVERY DAY, Disp: 90 tablet, Rfl: 1  •  levothyroxine (Synthroid) 112 MCG tablet, Take 1 tablet by mouth Daily., Disp: 90 tablet, Rfl: 1  •  losartan (COZAAR) 100 MG tablet, TAKE 1 TABLET EVERY DAY, Disp: 90 tablet, Rfl: 2  •  potassium chloride (K-DUR,KLOR-CON) 20 MEQ CR tablet, Take 2 tablets by mouth Daily., Disp: 4 tablet, Rfl: 0  •  pravastatin (PRAVACHOL) 10 MG tablet, TAKE 1 TABLET BY MOUTH DAILY, Disp: 90 tablet, Rfl: 1  •  sucralfate (Carafate) 1 g tablet, Take 1 tablet by mouth 2 (Two) Times a Day., Disp: 180 tablet, Rfl: 1  •  metroNIDAZOLE (Flagyl) 500 MG tablet, Take 1 tablet by mouth 3 (Three) Times a Day., Disp: 20 tablet, Rfl: 0  •  tetracycline (ACHROMYCIN,SUMYCIN) 500 MG capsule, Take 1 capsule by mouth 4 (Four) Times a Day., Disp: 20 capsule, Rfl: 0

## 2021-07-07 NOTE — PATIENT INSTRUCTIONS
Tetracycline 500 mg twice daily x10 days  Flagyl 500 mg 3 times daily x10 days  Nystatin swish and swallow 5 times a day  Over-the-counter oral material as needed for gum pain  Ibuprofen 400 mg 4 times a day  Possible referral to Dr. Whitehead

## 2021-07-08 RX ORDER — CLINDAMYCIN HYDROCHLORIDE 300 MG/1
300 CAPSULE ORAL 2 TIMES DAILY
Qty: 20 CAPSULE | Refills: 0 | Status: SHIPPED | OUTPATIENT
Start: 2021-07-08 | End: 2021-07-18

## 2021-07-19 ENCOUNTER — TELEPHONE (OUTPATIENT)
Dept: FAMILY MEDICINE CLINIC | Facility: CLINIC | Age: 71
End: 2021-07-19

## 2021-07-19 DIAGNOSIS — M79.604 PAIN OF RIGHT LOWER EXTREMITY: Primary | ICD-10-CM

## 2021-07-19 DIAGNOSIS — M19.90 ARTHRITIS: Primary | ICD-10-CM

## 2021-07-19 RX ORDER — PREDNISONE 5 MG/1
TABLET ORAL
Qty: 20 TABLET | Refills: 0 | Status: SHIPPED | OUTPATIENT
Start: 2021-07-19 | End: 2021-07-19

## 2021-07-19 RX ORDER — PREDNISONE 5 MG/1
TABLET ORAL
Qty: 20 TABLET | Refills: 0 | Status: SHIPPED | OUTPATIENT
Start: 2021-07-19 | End: 2021-07-27

## 2021-07-19 NOTE — TELEPHONE ENCOUNTER
Caller: Shannon Dyson    Relationship: Self    Best call back number: [phone]    Medication needed:   Requested Prescriptions      No prescriptions requested or ordered in this encounter       When do you need the refill by: ASAP    What additional details did the patient provide when requesting the medication:     PATIENT REQUESTING STEROIDS TO REDUCE INFLAMMATION IN [place]AT[place]. PATIENT STATES SHE IS IN A LOT OF PAIN AND WAS UNABLE TO WALK YESTERDAY.    Does the patient have less than a 3 day supply:  [x] Yes  [] No    What is the patient's preferred pharmacy: [id number] - [place], [place] - [address] AT [facility] [address] - [phone]  - [phone] FX

## 2021-07-19 NOTE — TELEPHONE ENCOUNTER
Pt calling to request a referral for rheumatology be placed for her leg, pt states she is fine with going to  Rheumatology in [place]

## 2021-07-19 NOTE — TELEPHONE ENCOUNTER
PHARMACY IS CLOSED DUE TO NOT HAVING A PHARMACIST TODAY.     THEY ARE ASKING THAT MEDICATION BE SENT TO    [facility]/pharmacy #[id number] - [place], [place] - [address] - [phone]  - [phone]   [phone]

## 2021-07-20 RX ORDER — PREDNISONE 5 MG/1
TABLET ORAL
Qty: 20 TABLET | Refills: 0 | Status: SHIPPED | OUTPATIENT
Start: 2021-07-20 | End: 2021-07-28

## 2021-07-22 ENCOUNTER — TELEPHONE (OUTPATIENT)
Dept: FAMILY MEDICINE CLINIC | Facility: CLINIC | Age: 71
End: 2021-07-22

## 2021-07-22 RX ORDER — TRAMADOL HYDROCHLORIDE 50 MG/1
50 TABLET ORAL EVERY 6 HOURS PRN
Qty: 60 TABLET | Refills: 0 | Status: SHIPPED | OUTPATIENT
Start: 2021-07-22 | End: 2021-08-23 | Stop reason: SDUPTHER

## 2021-07-22 NOTE — TELEPHONE ENCOUNTER
Pt's daughter called in stating so far nothing has helped her mother with her pain. She states the pt has been in the ER twice, urgent care twice, and has done physical therapy as well as home remedies and the medications that have been prescribed, but nothing has eased her pain. Pt's daughter states she is very concerned about her mother and is requesting a medication be sent in for the pain, I also scheduled appt with rj for 7/27

## 2021-07-22 NOTE — TELEPHONE ENCOUNTER
Caller: Shannon Dyson    Relationship: Self    Best call back number: [phone]    What is the provider, practice or medical service name: [facility]    What is the office location: [place]    What is the office phone number: FAX NUMBER [phone]    Any additional details: PATIENT STATED SHE CALLED THIS MORNING AND THEY SAID THEY HAD NOT RECEIVED THE ORDERS YET.     PLEASE CALL AND ADVISE

## 2021-07-22 NOTE — TELEPHONE ENCOUNTER
Referral was faxed on the 19th and I filled out the online form on  rheumatology's website the same day, but I re-faxed the info just in case it didn't go through on their end the first time it was sent. Pt advised

## 2021-08-09 RX ORDER — POTASSIUM CHLORIDE 20 MEQ/1
40 TABLET, EXTENDED RELEASE ORAL DAILY
Qty: 4 TABLET | Refills: 0 | Status: CANCELLED | OUTPATIENT
Start: 2021-08-09

## 2021-08-09 NOTE — TELEPHONE ENCOUNTER
Incoming Refill Request      Medication requested (name and dose): potassium chloride (K-DUR,KLOR-CON) 20 MEQ CR tablet    Pharmacy where request should be sent:[facility]    Additional details provided by patient:     Best call back number:     Does the patient have less than a 3 day supply:  [] Yes  [] No    Nam Vogt Rep  08/09/21, 12:42 EDT

## 2021-08-10 ENCOUNTER — TELEPHONE (OUTPATIENT)
Dept: FAMILY MEDICINE CLINIC | Facility: CLINIC | Age: 71
End: 2021-08-10

## 2021-08-11 ENCOUNTER — OFFICE VISIT (OUTPATIENT)
Dept: FAMILY MEDICINE CLINIC | Facility: CLINIC | Age: 71
End: 2021-08-11

## 2021-08-11 VITALS
OXYGEN SATURATION: 99 % | WEIGHT: 146.8 LBS | BODY MASS INDEX: 25.06 KG/M2 | HEART RATE: 65 BPM | SYSTOLIC BLOOD PRESSURE: 146 MMHG | TEMPERATURE: 97.7 F | DIASTOLIC BLOOD PRESSURE: 88 MMHG | HEIGHT: 64 IN

## 2021-08-11 DIAGNOSIS — Z00.00 PREVENTATIVE HEALTH CARE: ICD-10-CM

## 2021-08-11 DIAGNOSIS — F41.8 ANXIETY ASSOCIATED WITH DEPRESSION: Primary | ICD-10-CM

## 2021-08-11 DIAGNOSIS — E78.2 MIXED HYPERLIPIDEMIA: ICD-10-CM

## 2021-08-11 DIAGNOSIS — R79.89 ELEVATED SERUM CREATININE: ICD-10-CM

## 2021-08-11 DIAGNOSIS — E03.9 ACQUIRED HYPOTHYROIDISM: ICD-10-CM

## 2021-08-11 PROCEDURE — 99213 OFFICE O/P EST LOW 20 MIN: CPT | Performed by: NURSE PRACTITIONER

## 2021-08-11 RX ORDER — CLONAZEPAM 0.5 MG/1
0.5 TABLET ORAL 2 TIMES DAILY PRN
Qty: 60 TABLET | Refills: 1 | Status: SHIPPED | OUTPATIENT
Start: 2021-08-11 | End: 2021-08-30

## 2021-08-11 RX ORDER — CITALOPRAM 10 MG/1
10 TABLET ORAL DAILY
Qty: 90 TABLET | Refills: 1 | Status: SHIPPED | OUTPATIENT
Start: 2021-08-11 | End: 2021-09-28 | Stop reason: ALTCHOICE

## 2021-08-11 NOTE — PROGRESS NOTES
"    Shannon Dyson is a 70 y.o. female.     70-year-old white female with history of hypertension, hyperlipidemia, rheumatoid arthritis, fibrocystic breast disease, hypothyroidism, anxiety with depression, worsening memory loss and appetite who comes in today with daughter with complaints of increased depression and anxiety  Blood pressure 146/88 heart rate 64 she denies any chest pain, dyspnea, tachycardia or dizziness    Patient states she has been crying a lot and extremely anxious and worrying about everything.  Patient was ordered Celexa last year but apparently has not been taking it so we reordered that.  I am also starting her on some Klonopin to take as needed to see if we can get her anxiety under control.  I am also referring her to a psychiatrist and patient agreed.  She has no suicidal ideation    On last visit patient was having ulcerations and blisters in her mouth which she states are much better with treatment  given at that time    Weight is down 4 pounds at 147 with a BMI of 25.2        Fasting blood work  Klonopin 0.5 mg take 1/2-1 as needed for anxiety  Celexa 10 mg daily  Psychiatry referral         The following portions of the patient's history were reviewed and updated as appropriate: allergies, current medications, past family history, past medical history, past social history, past surgical history and problem list.    Vitals:    08/11/21 0947   BP: 146/88   BP Location: Right arm   Patient Position: Sitting   Cuff Size: Adult   Pulse: 65   Temp: 97.7 °F (36.5 °C)   TempSrc: Temporal   SpO2: 99%   Weight: 66.6 kg (146 lb 12.8 oz)   Height: 162.6 cm (64\")     Body mass index is 25.2 kg/m².    Past Medical History:   Diagnosis Date   • Anxiety    • Breast cyst    • Fibrocystic breast    • GERD (gastroesophageal reflux disease)    • Hyperlipidemia    • Hypertension    • Hypothyroidism      Past Surgical History:   Procedure Laterality Date   • APPENDECTOMY     • BREAST BIOPSY     • BREAST " SURGERY     • HYSTERECTOMY       Family History   Problem Relation Age of Onset   • Heart disease Mother    • Arthritis Mother    • Heart disease Father    • Arthritis Father      Immunization History   Administered Date(s) Administered   • COVID-19 (MODERNA) 01/29/2021, 02/26/2021   • Flulaval/Fluarix/Fluzone Quad 11/02/2019   • Fluzone High Dose =>65 Years ([employer] ONLY) 09/28/2019, 11/02/2019   • H1N1 Inj Preservative Free 11/19/2009   • Influenza Quad Vaccine (Inpatient) 10/08/2015   • Pneumococcal Conjugate 13-Valent (PCV13) 10/02/2019, 10/06/2020   • Shingrix 10/02/2019, 10/06/2020   • Zostavax 10/28/2016       Admission on 06/25/2021, Discharged on 06/25/2021   Component Date Value Ref Range Status   • Target HR (85%) 06/25/2021 126  bpm Final   • Max. Pred. HR (100%) 06/25/2021 148  bpm Final   • Right Common Femoral Spont 06/25/2021 Y   Final   • Right Common Femoral Phasic 06/25/2021 Y   Final   • Right Common Femoral Augment 06/25/2021 Y   Final   • Right Common Femoral Competent 06/25/2021 Y   Final   • Right Common Femoral Compress 06/25/2021 C   Final   • Right Saphenofemoral Junction Comp* 06/25/2021 C   Final   • Right Proximal Femoral Compress 06/25/2021 C   Final   • Right Mid Femoral Spont 06/25/2021 Y   Final   • Right Mid Femoral Phasic 06/25/2021 Y   Final   • Right Mid Femoral Augment 06/25/2021 Y   Final   • Right Mid Femoral Competent 06/25/2021 Y   Final   • Right Mid Femoral Compress 06/25/2021 C   Final   • Right Distal Femoral Compress 06/25/2021 C   Final   • Right Popliteal Spont 06/25/2021 Y   Final   • Right Popliteal Phasic 06/25/2021 Y   Final   • Right Popliteal Augment 06/25/2021 Y   Final   • Right Popliteal Competent 06/25/2021 Y   Final   • Right Popliteal Compress 06/25/2021 C   Final   • Right Posterior Tibial Compress 06/25/2021 C   Final   • Right Peroneal Compress 06/25/2021 C   Final   • Right GastronemiusSoleal Compress 06/25/2021 C   Final   • Right Greater Saph AK  Compress 06/25/2021 C   Final   • Right Greater Saph BK Compress 06/25/2021 C   Final   • Right Lesser Saph Compress 06/25/2021 C   Final   • Left Common Femoral Spont 06/25/2021 Y   Final   • Left Common Femoral Phasic 06/25/2021 Y   Final   • Left Common Femoral Augment 06/25/2021 Y   Final   • Left Common Femoral Competent 06/25/2021 Y   Final   • Left Common Femoral Compress 06/25/2021 C   Final         Review of Systems   Constitutional: Negative.    HENT: Negative.    Respiratory: Negative.    Cardiovascular: Negative.    Gastrointestinal: Negative.    Genitourinary: Negative.    Musculoskeletal: Negative.    Skin: Negative.    Neurological: Negative.    Psychiatric/Behavioral: Positive for depressed mood. The patient is nervous/anxious.        Objective   Physical Exam  Constitutional:       Appearance: Normal appearance.   HENT:      Head: Normocephalic.   Cardiovascular:      Rate and Rhythm: Normal rate and regular rhythm.      Pulses: Normal pulses.      Heart sounds: Normal heart sounds.   Pulmonary:      Effort: Pulmonary effort is normal.      Breath sounds: Normal breath sounds.   Abdominal:      General: Bowel sounds are normal.   Musculoskeletal:         General: Normal range of motion.   Skin:     General: Skin is warm and dry.   Neurological:      General: No focal deficit present.      Mental Status: She is alert and oriented to person, place, and time.   Psychiatric:         Mood and Affect: Mood normal.         Behavior: Behavior normal.      Comments: Patient tearful be in office today         Procedures    Assessment/Plan   Diagnoses and all orders for this visit:    1. Anxiety associated with depression (Primary)  -     Ambulatory Referral to Psychiatry    2. Acquired hypothyroidism  -     TSH+Free T4  -     T3    3. Mixed hyperlipidemia  -     Lipid Panel With LDL / HDL Ratio    4. Elevated serum creatinine  -     Comprehensive Metabolic Panel    5. Preventative health care  -     CBC &  Differential    6. BMI 25.0-25.9,adult    Other orders  -     clonazePAM (KlonoPIN) 0.5 MG tablet; Take 1 tablet by mouth 2 (Two) Times a Day As Needed for Anxiety.  Dispense: 60 tablet; Refill: 1  -     citalopram (CeleXA) 10 MG tablet; Take 1 tablet by mouth Daily.  Dispense: 90 tablet; Refill: 1          Current Outpatient Medications:   •  atenolol (TENORMIN) 50 MG tablet, Take 1 tablet by mouth Daily., Disp: 90 tablet, Rfl: 1  •  citalopram (CeleXA) 10 MG tablet, Take 1 tablet by mouth Daily., Disp: 90 tablet, Rfl: 1  •  cyclobenzaprine (FLEXERIL) 10 MG tablet, Take 1 tablet by mouth At Night As Needed for Muscle Spasms., Disp: 30 tablet, Rfl: 2  •  D3 Super Strength 50 MCG (2000 UT) capsule, TAKE 2 CAPSULES EVERY DAY, Disp: 180 capsule, Rfl: 1  •  doxepin (SINEquan) 50 MG capsule, Take 1 capsule by mouth Daily., Disp: , Rfl:   •  gemfibrozil (LOPID) 600 MG tablet, Take 1 tablet by mouth 2 (Two) Times a Day Before Meals., Disp: 180 tablet, Rfl: 1  •  hydroCHLOROthiazide (HYDRODIURIL) 25 MG tablet, TAKE 1/2 TO 1 TABLET EVERY DAY, Disp: 90 tablet, Rfl: 1  •  levothyroxine (Synthroid) 112 MCG tablet, Take 1 tablet by mouth Daily., Disp: 90 tablet, Rfl: 1  •  losartan (COZAAR) 100 MG tablet, TAKE 1 TABLET EVERY DAY, Disp: 90 tablet, Rfl: 2  •  pravastatin (PRAVACHOL) 10 MG tablet, TAKE 1 TABLET BY MOUTH DAILY, Disp: 90 tablet, Rfl: 1  •  traMADol (ULTRAM) 50 MG tablet, Take 1 tablet by mouth Every 6 (Six) Hours As Needed for Moderate Pain ., Disp: 60 tablet, Rfl: 0  •  clonazePAM (KlonoPIN) 0.5 MG tablet, Take 1 tablet by mouth 2 (Two) Times a Day As Needed for Anxiety., Disp: 60 tablet, Rfl: 1

## 2021-08-11 NOTE — PATIENT INSTRUCTIONS
Fasting blood work  Klonopin 0.5 mg take 1/2-1 as needed for anxiety  Celexa 10 mg daily  Psychiatry referral

## 2021-08-12 LAB
ALBUMIN SERPL-MCNC: 4.5 G/DL (ref 3.7–4.7)
ALBUMIN/GLOB SERPL: 1.6 {RATIO} (ref 1.2–2.2)
ALP SERPL-CCNC: 112 IU/L (ref 48–121)
ALT SERPL-CCNC: 33 IU/L (ref 0–32)
AST SERPL-CCNC: 45 IU/L (ref 0–40)
BASOPHILS # BLD AUTO: 0.1 X10E3/UL (ref 0–0.2)
BASOPHILS NFR BLD AUTO: 2 %
BILIRUB SERPL-MCNC: 0.8 MG/DL (ref 0–1.2)
BUN SERPL-MCNC: 35 MG/DL (ref 8–27)
BUN/CREAT SERPL: 37 (ref 12–28)
CALCIUM SERPL-MCNC: 10.5 MG/DL (ref 8.7–10.3)
CHLORIDE SERPL-SCNC: 104 MMOL/L (ref 96–106)
CHOLEST SERPL-MCNC: 222 MG/DL (ref 100–199)
CO2 SERPL-SCNC: 21 MMOL/L (ref 20–29)
CREAT SERPL-MCNC: 0.95 MG/DL (ref 0.57–1)
EOSINOPHIL # BLD AUTO: 0.3 X10E3/UL (ref 0–0.4)
EOSINOPHIL NFR BLD AUTO: 6 %
ERYTHROCYTE [DISTWIDTH] IN BLOOD BY AUTOMATED COUNT: 13 % (ref 11.7–15.4)
GLOBULIN SER CALC-MCNC: 2.8 G/DL (ref 1.5–4.5)
GLUCOSE SERPL-MCNC: 90 MG/DL (ref 65–99)
HCT VFR BLD AUTO: 38.9 % (ref 34–46.6)
HDLC SERPL-MCNC: 55 MG/DL
HGB BLD-MCNC: 13.5 G/DL (ref 11.1–15.9)
IMM GRANULOCYTES # BLD AUTO: 0 X10E3/UL (ref 0–0.1)
IMM GRANULOCYTES NFR BLD AUTO: 0 %
LDLC SERPL CALC-MCNC: 156 MG/DL (ref 0–99)
LDLC/HDLC SERPL: 2.8 RATIO (ref 0–3.2)
LYMPHOCYTES # BLD AUTO: 1.1 X10E3/UL (ref 0.7–3.1)
LYMPHOCYTES NFR BLD AUTO: 22 %
MCH RBC QN AUTO: 32.1 PG (ref 26.6–33)
MCHC RBC AUTO-ENTMCNC: 34.7 G/DL (ref 31.5–35.7)
MCV RBC AUTO: 93 FL (ref 79–97)
MONOCYTES # BLD AUTO: 0.4 X10E3/UL (ref 0.1–0.9)
MONOCYTES NFR BLD AUTO: 8 %
NEUTROPHILS # BLD AUTO: 3.1 X10E3/UL (ref 1.4–7)
NEUTROPHILS NFR BLD AUTO: 62 %
PLATELET # BLD AUTO: 245 X10E3/UL (ref 150–450)
POTASSIUM SERPL-SCNC: 3.9 MMOL/L (ref 3.5–5.2)
PROT SERPL-MCNC: 7.3 G/DL (ref 6–8.5)
RBC # BLD AUTO: 4.2 X10E6/UL (ref 3.77–5.28)
SODIUM SERPL-SCNC: 141 MMOL/L (ref 134–144)
T3 SERPL-MCNC: 74 NG/DL (ref 71–180)
T4 FREE SERPL-MCNC: 0.99 NG/DL (ref 0.82–1.77)
TRIGL SERPL-MCNC: 61 MG/DL (ref 0–149)
TSH SERPL DL<=0.005 MIU/L-ACNC: 10.7 UIU/ML (ref 0.45–4.5)
VLDLC SERPL CALC-MCNC: 11 MG/DL (ref 5–40)
WBC # BLD AUTO: 5 X10E3/UL (ref 3.4–10.8)

## 2021-08-12 RX ORDER — LEVOTHYROXINE SODIUM 0.12 MG/1
125 TABLET ORAL DAILY
Qty: 90 TABLET | Refills: 1 | Status: SHIPPED | OUTPATIENT
Start: 2021-08-12 | End: 2021-08-12

## 2021-08-12 RX ORDER — LEVOTHYROXINE SODIUM 0.1 MG/1
100 TABLET ORAL DAILY
Qty: 90 TABLET | Refills: 1 | Status: SHIPPED | OUTPATIENT
Start: 2021-08-12 | End: 2021-08-16 | Stop reason: DRUGHIGH

## 2021-08-16 RX ORDER — LEVOTHYROXINE SODIUM 112 UG/1
112 TABLET ORAL DAILY
Qty: 90 TABLET | Refills: 0 | Status: SHIPPED | OUTPATIENT
Start: 2021-08-16 | End: 2021-11-05 | Stop reason: SDUPTHER

## 2021-08-16 RX ORDER — LEVOTHYROXINE SODIUM 112 UG/1
112 TABLET ORAL DAILY
COMMUNITY
End: 2021-08-16 | Stop reason: SDUPTHER

## 2021-08-23 ENCOUNTER — TELEPHONE (OUTPATIENT)
Dept: FAMILY MEDICINE CLINIC | Facility: CLINIC | Age: 71
End: 2021-08-23

## 2021-08-23 DIAGNOSIS — M79.604 PAIN OF RIGHT LOWER EXTREMITY: ICD-10-CM

## 2021-08-23 RX ORDER — TRAMADOL HYDROCHLORIDE 50 MG/1
50 TABLET ORAL EVERY 6 HOURS PRN
Qty: 60 TABLET | Refills: 0 | Status: SHIPPED | OUTPATIENT
Start: 2021-08-23 | End: 2022-03-15

## 2021-08-23 RX ORDER — TRAMADOL HYDROCHLORIDE 50 MG/1
50 TABLET ORAL EVERY 6 HOURS PRN
Qty: 60 TABLET | Refills: 0 | Status: CANCELLED | OUTPATIENT
Start: 2021-08-23

## 2021-08-23 NOTE — TELEPHONE ENCOUNTER
Caller: Shannon Dyson    Relationship: Self    Best call back number: [phone]    Medication needed:   Requested Prescriptions     Pending Prescriptions Disp Refills   • traMADol (ULTRAM) 50 MG tablet 60 tablet 0     Sig: Take 1 tablet by mouth Every 6 (Six) Hours As Needed for Moderate Pain .       When do you need the refill by: ASAP    What additional details did the patient provide when requesting the medication: PATIENT IS ALMOST OUT OF THIS MEDICATION    Does the patient have less than a 3 day supply:  [x] Yes  [] No    What is the patient's preferred pharmacy: [id number] - [place], [place] - [address] AT [facility] [address] [facility] - [phone] [facility] [phone] FX

## 2021-08-23 NOTE — TELEPHONE ENCOUNTER
Spoke with pt.  Looked in the referral and looks like its in scheduling review.  Told pt she would get a call from their office.  SG

## 2021-08-23 NOTE — TELEPHONE ENCOUNTER
PATIENT WOULD LIKE A CALL WITH THE REFERRAL INFORMATION FOR ANXIETY AND HELP SCHEDULING.  PLEASE CONTACT HER @ [phone]

## 2021-08-30 ENCOUNTER — TELEPHONE (OUTPATIENT)
Dept: FAMILY MEDICINE CLINIC | Facility: CLINIC | Age: 71
End: 2021-08-30

## 2021-08-30 NOTE — TELEPHONE ENCOUNTER
Spoke with pts .  He said she is back to taking her medications like she should.  He will have her stop the Clonazepam and see how it goes.  Voiced understanding about evaluation if need be.  SG

## 2021-08-30 NOTE — TELEPHONE ENCOUNTER
So I am assuming the daughter got her medications straightened out and she is taking them regularly?  I would stop the Klonopin and continue the celexa and see if behavior improves she may need a psych admission if behavior does not improve

## 2021-08-30 NOTE — TELEPHONE ENCOUNTER
PATIENT'S  IS CALLING TODAY BECAUSE THE PATIENT HAS BECAME VERY AGGRESSIVE THIS MORNING.  SAID THAT THE PATIENT STARTED TO THROW THINGS AND YELL AT THEIR DAUGHTER THIS MORNING BECAUSE THE DAUGHTER WAS TELLING THE PATIENT TO CUT BACK ON THE CLONAZEPAM.      IS CALLING TO SEE IF THE ANTI-ANXIETY AND ANTI-DEPRESSION MEDICATION COULD BE CAUSING THIS. PLEASE ADVISE.     CALL: [phone]

## 2021-08-31 ENCOUNTER — OFFICE VISIT (OUTPATIENT)
Dept: PSYCHIATRY | Facility: CLINIC | Age: 71
End: 2021-08-31

## 2021-08-31 DIAGNOSIS — F33.9 MAJOR DEPRESSION, RECURRENT, CHRONIC (HCC): Primary | Chronic | ICD-10-CM

## 2021-08-31 DIAGNOSIS — R41.81 AGE-RELATED COGNITIVE DECLINE: Chronic | ICD-10-CM

## 2021-08-31 DIAGNOSIS — F41.1 GENERALIZED ANXIETY DISORDER: Chronic | ICD-10-CM

## 2021-08-31 PROCEDURE — 90792 PSYCH DIAG EVAL W/MED SRVCS: CPT

## 2021-08-31 RX ORDER — DONEPEZIL HYDROCHLORIDE 5 MG/1
5 TABLET, FILM COATED ORAL NIGHTLY
Qty: 30 TABLET | Refills: 0 | Status: SHIPPED | OUTPATIENT
Start: 2021-08-31 | End: 2021-08-31

## 2021-08-31 RX ORDER — DONEPEZIL HYDROCHLORIDE 5 MG/1
5 TABLET, FILM COATED ORAL NIGHTLY
Qty: 90 TABLET | Refills: 0 | Status: SHIPPED | OUTPATIENT
Start: 2021-08-31 | End: 2021-09-28 | Stop reason: SDUPTHER

## 2021-08-31 RX ORDER — ARIPIPRAZOLE 5 MG/1
5 TABLET ORAL DAILY
Qty: 30 TABLET | Refills: 0 | Status: SHIPPED | OUTPATIENT
Start: 2021-08-31 | End: 2021-10-08 | Stop reason: SDUPTHER

## 2021-08-31 NOTE — PROGRESS NOTES
"Subjective   Shannon Dyson is a 70 y.o. female who presents today for initial evaluation     Chief Complaint: Anxiety and Depression    History of Present Illness:  Pt has been struggling with both anxiety and depression at different times in her life, but she currently feels it is worse than in the past. She had an explosive episode yesterday that PCP was contacted about, and PCP stopped Clonazepam. She was angry because her daughter told her she needed to cut back on the clonazepam use. She also recently started Tramadol for sciatrica related pain. She admits to memory difficulty that occurred prior to her current episode of depression. She admits to sleeping difficulty and a family hx of bipolar. She states she doesn't want to continue to \"feel like this.\" Her anxiety is significant, worrying about people she loves dying. Manic symptoms admitted to on MDQ, along with SI more than half the days, without a plan or belief that actions on SI will occur. Pt denies HI, past psychiatric hospitalizations, or past SA.    The following portions of the patient's history were reviewed and updated as appropriate: allergies, current medications, past family history, past medical history, past social history, past surgical history and problem list.    PAST PSYCHIATRIC HISTORY  Axis I  Depression  Anxiety  Axis II  None    PAST OUTPATIENT TREATMENT  Diagnosis treated:  Affective Disorder, Anxiety/Panic Disorder  Treatment Type:  Medication Management by PCP.  Prior Psychiatric Medications:  Celexa - ineffective  Can't recall any meds prior to Celexa.  Support Groups:  None  Sequelae Of Mental Disorder:  social isolation, emotional distress    Interval History  No Change    Side Effects  None noted on Celexa.    Past Medical History:  Past Medical History:   Diagnosis Date   • Anxiety    • Breast cyst    • Fibrocystic breast    • GERD (gastroesophageal reflux disease)    • Hyperlipidemia    • Hypertension    • Hypothyroidism "        Social History:  Social History     Socioeconomic History   • Marital status:      Spouse name: Not on file   • Number of children: Not on file   • Years of education: Not on file   • Highest education level: Not on file   Tobacco Use   • Smoking status: Never Smoker   • Smokeless tobacco: Never Used   Substance and Sexual Activity   • Alcohol use: No   • Drug use: No   • Sexual activity: Defer       Family History:  Family History   Problem Relation Age of Onset   • Heart disease Mother    • Arthritis Mother    • Heart disease Father    • Arthritis Father        Past Surgical History:  Past Surgical History:   Procedure Laterality Date   • APPENDECTOMY     • BREAST BIOPSY     • BREAST SURGERY     • HYSTERECTOMY         Problem List:  Patient Active Problem List   Diagnosis   • Allergic rhinitis   • Anxiety associated with depression   • Bunion   • Carpal tunnel syndrome   • Degenerative joint disease   • Dependent edema   • Dermatitis   • Elevated antinuclear antibody (COMPA) level   • Encounter for immunization   • Encounter for screening for osteoporosis   • Fibrocystic breast disease   • Gastroesophageal reflux disease   • Hyperlipidemia   • Hypertension   • Hypokalemia   • Hypovitaminosis D   • Hypothyroidism   • Low back pain   • Myalgia   • Neurodermatitis   • Osteoporosis   • Other long term (current) drug therapy   • Mouth pain   • Elevated serum creatinine   • Memory loss   • Loss of appetite   • Pain in gums   • BMI 25.0-25.9,adult       Allergy:   Allergies   Allergen Reactions   • Codeine Nausea And Vomiting   • Penicillin G Unknown (See Comments)   • Amlodipine Swelling        Discontinued Medications:  There are no discontinued medications.    Current Medications:   Current Outpatient Medications   Medication Sig Dispense Refill   • atenolol (TENORMIN) 50 MG tablet Take 1 tablet by mouth Daily. 90 tablet 1   • citalopram (CeleXA) 10 MG tablet Take 1 tablet by mouth Daily. 90 tablet 1   •  D3 Super Strength 50 MCG (2000 UT) capsule TAKE 2 CAPSULES EVERY  capsule 1   • doxepin (SINEquan) 50 MG capsule Take 1 capsule by mouth Daily.     • gemfibrozil (LOPID) 600 MG tablet Take 1 tablet by mouth 2 (Two) Times a Day Before Meals. 180 tablet 1   • levothyroxine (SYNTHROID, LEVOTHROID) 112 MCG tablet Take 1 tablet by mouth Daily. 90 tablet 0   • losartan (COZAAR) 100 MG tablet TAKE 1 TABLET EVERY DAY 90 tablet 2   • pravastatin (PRAVACHOL) 10 MG tablet TAKE 1 TABLET BY MOUTH DAILY 90 tablet 1   • traMADol (ULTRAM) 50 MG tablet Take 1 tablet by mouth Every 6 (Six) Hours As Needed for Moderate Pain . 60 tablet 0   • ARIPiprazole (ABILIFY) 5 MG tablet Take 1 tablet by mouth Daily. 30 tablet 0   • cyclobenzaprine (FLEXERIL) 10 MG tablet Take 1 tablet by mouth At Night As Needed for Muscle Spasms. 30 tablet 2   • donepezil (Aricept) 5 MG tablet Take 1 tablet by mouth Every Night. 30 tablet 0   • hydroCHLOROthiazide (HYDRODIURIL) 25 MG tablet TAKE 1/2 TO 1 TABLET EVERY DAY 90 tablet 1     No current facility-administered medications for this visit.     Psychological ROS: positive for - anxiety, depression, concentration difficulties, irritability, memory difficulties, mood swings, sleep disturbances and suicidal ideation.  negative for - disorientation, hallucinations or hostility.    Physical Exam:   not currently breastfeeding.    Mental Status Exam:   Orientation:  To person, Place, Time and Situation  Memory: Recent and remote memory Deficits  Mood/Affect: Depressed and restricted.  Suicidal Ideations: Suicidal Ideation without plans or anticipated actions. Lives with  that keeps an eye on her.  Homicidal Ideations:  None  Hallucinations: None  Delusions:  None  Obsessions: None  Behavior and Psychomotor Activity: Slow  Speech:  Minimal  Thought Process: Goal directed  Associations: Intact  Thought Content: Normal  Language: name objects and repeat phrases  Concentration and computation:  Poor  Attention Span: Fair  Fund of Knowledge: Fair  Reliability: good  Insight into mental health: Poor  Judgement: Fair  Impulse Control:  Poor  Hygiene: good  Cooperation:  Cooperative  Eye Contact:  Fair  Physical/Medical Issues: Yes, HLD, HTN, age-related cognitive decline.    PHQ-9 Depression Screening  Little interest or pleasure in doing things? 1   Feeling down, depressed, or hopeless? 2   Trouble falling or staying asleep, or sleeping too much? 3   Feeling tired or having little energy? 3   Poor appetite or overeating? 3   Feeling bad about yourself - or that you are a failure or have let yourself or your family down? 3   Trouble concentrating on things, such as reading the newspaper or watching television? 2   Moving or speaking so slowly that other people could have noticed? Or the opposite - being so fidgety or restless that you have been moving around a lot more than usual? 2   Thoughts that you would be better off dead, or of hurting yourself in some way? 2   PHQ-9 Total Score 21   If you checked off any problems, how difficult have these problems made it for you to do your work, take care of things at home, or get along with other people? Somewhat difficult   PHQ-9: 21; severe depression  ALBA-7: 16; severe anxiety disorder  MDQ: Negative; bipolar not indicated    Never smoker    I advised Shannon of the risks of tobacco use.     Lab Results:   Office Visit on 08/11/2021   Component Date Value Ref Range Status   • WBC 08/11/2021 5.0  3.4 - 10.8 x10E3/uL Final   • RBC 08/11/2021 4.20  3.77 - 5.28 x10E6/uL Final   • Hemoglobin 08/11/2021 13.5  11.1 - 15.9 g/dL Final   • Hematocrit 08/11/2021 38.9  34.0 - 46.6 % Final   • MCV 08/11/2021 93  79 - 97 fL Final   • MCH 08/11/2021 32.1  26.6 - 33.0 pg Final   • MCHC 08/11/2021 34.7  31.5 - 35.7 g/dL Final   • RDW 08/11/2021 13.0  11.7 - 15.4 % Final   • Platelets 08/11/2021 245  150 - 450 x10E3/uL Final   • Neutrophil Rel % 08/11/2021 62  Not Estab. % Final   •  Lymphocyte Rel % 08/11/2021 22  Not Estab. % Final   • Monocyte Rel % 08/11/2021 8  Not Estab. % Final   • Eosinophil Rel % 08/11/2021 6  Not Estab. % Final   • Basophil Rel % 08/11/2021 2  Not Estab. % Final   • Neutrophils Absolute 08/11/2021 3.1  1.4 - 7.0 x10E3/uL Final   • Lymphocytes Absolute 08/11/2021 1.1  0.7 - 3.1 x10E3/uL Final   • Monocytes Absolute 08/11/2021 0.4  0.1 - 0.9 x10E3/uL Final   • Eosinophils Absolute 08/11/2021 0.3  0.0 - 0.4 x10E3/uL Final   • Basophils Absolute 08/11/2021 0.1  0.0 - 0.2 x10E3/uL Final   • Immature Granulocyte Rel % 08/11/2021 0  Not Estab. % Final   • Immature Grans Absolute 08/11/2021 0.0  0.0 - 0.1 x10E3/uL Final   • Glucose 08/11/2021 90  65 - 99 mg/dL Final   • BUN 08/11/2021 35* 8 - 27 mg/dL Final   • Creatinine 08/11/2021 0.95  0.57 - 1.00 mg/dL Final   • eGFR Non  Am 08/11/2021 60  >59 mL/min/1.73 Final   • eGFR African Am 08/11/2021 69  >59 mL/min/1.73 Final    Comment: **[facility] currently reports eGFR in compliance with the current**    recommendations of the [employer]. [facility] will    update reporting as new guidelines are published from the NKF-ASN    Task force.     • BUN/Creatinine Ratio 08/11/2021 37* 12 - 28 Final   • Sodium 08/11/2021 141  134 - 144 mmol/L Final   • Potassium 08/11/2021 3.9  3.5 - 5.2 mmol/L Final   • Chloride 08/11/2021 104  96 - 106 mmol/L Final   • Total CO2 08/11/2021 21  20 - 29 mmol/L Final   • Calcium 08/11/2021 10.5* 8.7 - 10.3 mg/dL Final   • Total Protein 08/11/2021 7.3  6.0 - 8.5 g/dL Final   • Albumin 08/11/2021 4.5  3.7 - 4.7 g/dL Final   • Globulin 08/11/2021 2.8  1.5 - 4.5 g/dL Final   • A/G Ratio 08/11/2021 1.6  1.2 - 2.2 Final   • Total Bilirubin 08/11/2021 0.8  0.0 - 1.2 mg/dL Final   • Alkaline Phosphatase 08/11/2021 112  48 - 121 IU/L Final   • AST (SGOT) 08/11/2021 45* 0 - 40 IU/L Final   • ALT (SGPT) 08/11/2021 33* 0 - 32 IU/L Final   • Total Cholesterol 08/11/2021 222* 100 - 199 mg/dL Final    • Triglycerides 08/11/2021 61  0 - 149 mg/dL Final   • HDL Cholesterol 08/11/2021 55  >39 mg/dL Final   • VLDL Cholesterol Adrian 08/11/2021 11  5 - 40 mg/dL Final   • LDL Chol Calc ([facility]) 08/11/2021 156* 0 - 99 mg/dL Final   • LDL/HDL RATIO 08/11/2021 2.8  0.0 - 3.2 ratio Final    Comment:                                     LDL/HDL Ratio                                              Men  Women                                1/2 Avg.Risk  1.0    1.5                                    Avg.Risk  3.6    3.2                                 2X Avg.Risk  6.2    5.0                                 3X Avg.Risk  8.0    6.1     • TSH 08/11/2021 10.700* 0.450 - 4.500 uIU/mL Final   • Free T4 08/11/2021 0.99  0.82 - 1.77 ng/dL Final   • T3, Total 08/11/2021 74  71 - 180 ng/dL Final   Admission on 06/25/2021, Discharged on 06/25/2021   Component Date Value Ref Range Status   • Target HR (85%) 06/25/2021 126  bpm Final   • Max. Pred. HR (100%) 06/25/2021 148  bpm Final   • Right Common Femoral Spont 06/25/2021 Y   Final   • Right Common Femoral Phasic 06/25/2021 Y   Final   • Right Common Femoral Augment 06/25/2021 Y   Final   • Right Common Femoral Competent 06/25/2021 Y   Final   • Right Common Femoral Compress 06/25/2021 C   Final   • Right Saphenofemoral Junction Comp* 06/25/2021 C   Final   • Right Proximal Femoral Compress 06/25/2021 C   Final   • Right Mid Femoral Spont 06/25/2021 Y   Final   • Right Mid Femoral Phasic 06/25/2021 Y   Final   • Right Mid Femoral Augment 06/25/2021 Y   Final   • Right Mid Femoral Competent 06/25/2021 Y   Final   • Right Mid Femoral Compress 06/25/2021 C   Final   • Right Distal Femoral Compress 06/25/2021 C   Final   • Right Popliteal Spont 06/25/2021 Y   Final   • Right Popliteal Phasic 06/25/2021 Y   Final   • Right Popliteal Augment 06/25/2021 Y   Final   • Right Popliteal Competent 06/25/2021 Y   Final   • Right Popliteal Compress 06/25/2021 C   Final   • Right Posterior Tibial Compress  06/25/2021 C   Final   • Right Peroneal Compress 06/25/2021 C   Final   • Right GastronemiusSoleal Compress 06/25/2021 C   Final   • Right Greater Saph AK Compress 06/25/2021 C   Final   • Right Greater Saph BK Compress 06/25/2021 C   Final   • Right Lesser Saph Compress 06/25/2021 C   Final   • Left Common Femoral Spont 06/25/2021 Y   Final   • Left Common Femoral Phasic 06/25/2021 Y   Final   • Left Common Femoral Augment 06/25/2021 Y   Final   • Left Common Femoral Competent 06/25/2021 Y   Final   • Left Common Femoral Compress 06/25/2021 C   Final       Assessment/Plan   Diagnoses and all orders for this visit:    1. Major depression, recurrent, chronic (CMS/HCC) (Primary)  -     ARIPiprazole (ABILIFY) 5 MG tablet; Take 1 tablet by mouth Daily.  Dispense: 30 tablet; Refill: 0    2. Age-related cognitive decline  -     donepezil (Aricept) 5 MG tablet; Take 1 tablet by mouth Every Night.  Dispense: 30 tablet; Refill: 0    PHQ-9: 21; severe depression  ALBA-7: 16; severe anxiety disorder  MDQ: Negative; bipolar not indicated  MMSE: 23; mild cognitive impairment  -Abilify started for severe major depression. Pt warned of possible side effects of worsening lipids and weight gain, along with Tarditive Dyskinesia. If abnormal involuntary movements or other side effects become apparent the pt is advised to call the office for instructions right away.  -Donepezil started for mild age-related cognitive decline due to pt complaint of memory difficulty prior to her current episode of depression and MMSE=23.  -Consider addition of Buspirone and Namenda in the future for further benefit and improvement if needed.  -Consider GeneSight in future if there are more medication failures.  -Will need to provide counseling resources at 2 week follow-up.  -r/o Bipolar Depression; MDQ negative, but there's family hx of bipolar and 5 manic symptoms admitted to on MDQ.    Visit Diagnoses:    ICD-10-CM ICD-9-CM   1. Major depression,  recurrent, chronic (CMS/HCC)  F33.9 296.30   2. Age-related cognitive decline  R41.81 294.9       TREATMENT PLAN/GOALS: In the short-term, the patient is to continue supportive psychotherapy efforts and medications as indicated. Treatment and medication options were discussed during today's visit. Long-term the goal will be to control symptoms so they do not negatively interfere with other aspects of the patient's life. Prognosis is optimistic with implementation of the current treatment plan. Patient ackowledged and verbally consented to the treatment plan and was educated on the importance of compliance with treatment and follow-up appointments.    MEDICATION ISSUES:  INSPECT reviewed 8/21/2021, and is as expected. Tramadol filled 8/23/21 and Clonazepam filled 8/11/21. (Pt no longer taking clonazepam)  Discussed medication options and treatment plan of prescribed medication as well as the risks, benefits, and side effects including potential falls, possible impaired driving, and metabolic adversities among others. Patient counseled on a multimodal approach with healthy nutrition, healthy sleep, regular physical activity, social activity, counseling, and medication taking part in his/her psychiatric management. Patient is agreeable to the plan, and he/she agreed to call the office with any worsening of symptoms or onset of side effects. Patient is agreeable to call 911 or go to the nearest ER should he/she begin having SI/HI or self-harming behavior.     Assisted patient in processing above session content; acknowledged and normalized patient’s thoughts, feelings, and concerns. Reviewed positive coping skills and behavior management in session with positive framing of thoughts. Allowed patient to freely discuss issues without interruption or judgment. Provided safe, confidential environment to facilitate the development of positive therapeutic relationship and encourage open and honest communication.    MEDS  ORDERED DURING VISIT:  New Medications Ordered This Visit   Medications   • donepezil (Aricept) 5 MG tablet     Sig: Take 1 tablet by mouth Every Night.     Dispense:  30 tablet     Refill:  0   • ARIPiprazole (ABILIFY) 5 MG tablet     Sig: Take 1 tablet by mouth Daily.     Dispense:  30 tablet     Refill:  0       Return in about 2 weeks (around 9/14/2021) for Recheck.         This document has been electronically signed by Mamadou Goss PA-C  August 31, 2021 10:31 EDT    EMR Dragon transcription disclaimer:  Some of this encounter note is an electronic transcription translation of spoken language to printed text. The electronic translation of spoken language may permit erroneous, or at times, nonsensical words or phrases to be inadvertently transcribed; Although I have reviewed the note for such errors some may still exist.

## 2021-09-07 ENCOUNTER — TELEPHONE (OUTPATIENT)
Dept: PSYCHIATRY | Facility: CLINIC | Age: 71
End: 2021-09-07

## 2021-09-08 RX ORDER — LEVOTHYROXINE SODIUM 0.12 MG/1
125 TABLET ORAL
COMMUNITY
Start: 2021-08-12 | End: 2021-11-16

## 2021-09-08 NOTE — TELEPHONE ENCOUNTER
They re going to see about using a Good rx card and transferring it to [facility] in [place].

## 2021-09-14 ENCOUNTER — OFFICE VISIT (OUTPATIENT)
Dept: PSYCHIATRY | Facility: CLINIC | Age: 71
End: 2021-09-14

## 2021-09-14 DIAGNOSIS — F09 MILD COGNITIVE DISORDER: Primary | Chronic | ICD-10-CM

## 2021-09-14 DIAGNOSIS — F41.8 ANXIETY ASSOCIATED WITH DEPRESSION: Chronic | ICD-10-CM

## 2021-09-14 PROCEDURE — 99214 OFFICE O/P EST MOD 30 MIN: CPT
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
## 2021-09-20 ENCOUNTER — TELEPHONE (OUTPATIENT)
Dept: PSYCHIATRY | Facility: CLINIC | Age: 71
End: 2021-09-20

## 2021-09-20 DIAGNOSIS — F41.8 ANXIETY ASSOCIATED WITH DEPRESSION: Primary | ICD-10-CM

## 2021-09-28 ENCOUNTER — OFFICE VISIT (OUTPATIENT)
Dept: PSYCHIATRY | Facility: CLINIC | Age: 71
End: 2021-09-28

## 2021-09-28 VITALS — BODY MASS INDEX: 24.48 KG/M2 | WEIGHT: 142.6 LBS

## 2021-09-28 DIAGNOSIS — L29.9 ITCHING: Chronic | ICD-10-CM

## 2021-09-28 DIAGNOSIS — R41.81 AGE-RELATED COGNITIVE DECLINE: Chronic | ICD-10-CM

## 2021-09-28 DIAGNOSIS — F41.8 ANXIETY ASSOCIATED WITH DEPRESSION: Primary | ICD-10-CM

## 2021-09-28 PROCEDURE — 99214 OFFICE O/P EST MOD 30 MIN: CPT

## 2021-09-28 RX ORDER — HYDROXYZINE HYDROCHLORIDE 25 MG/1
25 TABLET, FILM COATED ORAL 2 TIMES DAILY PRN
Qty: 60 TABLET | Refills: 1 | Status: SHIPPED | OUTPATIENT
Start: 2021-09-28 | End: 2021-10-15 | Stop reason: ALTCHOICE

## 2021-09-28 RX ORDER — DONEPEZIL HYDROCHLORIDE 5 MG/1
5 TABLET, FILM COATED ORAL NIGHTLY
Qty: 90 TABLET | Refills: 1 | Status: SHIPPED | OUTPATIENT
Start: 2021-09-28 | End: 2021-10-15

## 2021-09-28 RX ORDER — VILAZODONE HYDROCHLORIDE 20 MG/1
20 TABLET ORAL DAILY
Qty: 30 TABLET | Refills: 1 | Status: SHIPPED | OUTPATIENT
Start: 2021-09-28 | End: 2021-10-15 | Stop reason: SDUPTHER
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
## 2021-10-08 ENCOUNTER — TELEPHONE (OUTPATIENT)
Dept: PSYCHIATRY | Facility: CLINIC | Age: 71
End: 2021-10-08

## 2021-10-08 DIAGNOSIS — F09 MILD COGNITIVE DISORDER: Chronic | ICD-10-CM

## 2021-10-08 DIAGNOSIS — F33.9 MAJOR DEPRESSION, RECURRENT, CHRONIC (HCC): Chronic | ICD-10-CM

## 2021-10-08 DIAGNOSIS — F41.8 ANXIETY ASSOCIATED WITH DEPRESSION: Primary | ICD-10-CM

## 2021-10-08 NOTE — TELEPHONE ENCOUNTER
Pt has been having cramping in toes, feet, and legs at night after taking meds.  Pt unsure of what all she takes at night, but she knows for sure she takes Aricept and hydroxyzine.   thinks she has taken aricept before she had cramps in legs.  He says she had cramps for years, but they recently got worse with her new night meds.  We think it might be hydroxyzine, which seems to be the last thing added.  Pt  will call back with any other nightly meds she might be taking.  She wants an appointment sooner than Nov. 29th if she can get in.  She is out of Viibryd samples.  She said the pharmacy says they are waiting on approval.  Please advise.

## 2021-10-08 NOTE — TELEPHONE ENCOUNTER
Rx Refill Note  Requested Prescriptions     Pending Prescriptions Disp Refills   • ARIPiprazole (ABILIFY) 5 MG tablet 30 tablet 0     Sig: Take 1 tablet by mouth Daily.      Last office visit with prescribing clinician: 9/28/2021      Next office visit with prescribing clinician: 11/29/2021     Office Visit with Mamadou Goss PA-C (09/28/2021)         Iva Saunders MA  10/08/21, 11:11 EDT

## 2021-10-11 RX ORDER — GEMFIBROZIL 600 MG/1
TABLET, FILM COATED ORAL
Qty: 180 TABLET | Refills: 1 | Status: SHIPPED | OUTPATIENT
Start: 2021-10-11 | End: 2022-05-11

## 2021-10-11 RX ORDER — ATENOLOL 50 MG/1
TABLET ORAL
Qty: 90 TABLET | Refills: 1 | Status: SHIPPED | OUTPATIENT
Start: 2021-10-11 | End: 2022-05-11

## 2021-10-12 ENCOUNTER — TRANSCRIBE ORDERS (OUTPATIENT)
Dept: ADMINISTRATIVE | Facility: HOSPITAL | Age: 71
End: 2021-10-12

## 2021-10-12 DIAGNOSIS — Z12.31 SCREENING MAMMOGRAM, ENCOUNTER FOR: Primary | ICD-10-CM

## 2021-10-13 RX ORDER — ARIPIPRAZOLE 5 MG/1
5 TABLET ORAL DAILY
Qty: 30 TABLET | Refills: 1 | Status: SHIPPED | OUTPATIENT
Start: 2021-10-13 | End: 2021-11-29 | Stop reason: SDUPTHER

## 2021-10-15 ENCOUNTER — HOSPITAL ENCOUNTER (OUTPATIENT)
Dept: MAMMOGRAPHY | Facility: HOSPITAL | Age: 71
End: 2021-10-15

## 2021-10-15 ENCOUNTER — OFFICE VISIT (OUTPATIENT)
Dept: PSYCHIATRY | Facility: CLINIC | Age: 71
End: 2021-10-15

## 2021-10-15 DIAGNOSIS — F41.8 ANXIETY ASSOCIATED WITH DEPRESSION: Primary | ICD-10-CM

## 2021-10-15 DIAGNOSIS — F09 MILD COGNITIVE DISORDER: Chronic | ICD-10-CM

## 2021-10-15 DIAGNOSIS — R41.81 AGE-RELATED COGNITIVE DECLINE: Chronic | ICD-10-CM

## 2021-10-15 PROCEDURE — 99214 OFFICE O/P EST MOD 30 MIN: CPT

## 2021-10-15 RX ORDER — VILAZODONE HYDROCHLORIDE 20 MG/1
20 TABLET ORAL DAILY
Qty: 30 TABLET | Refills: 1 | Status: SHIPPED | OUTPATIENT
Start: 2021-10-15 | End: 2021-11-03 | Stop reason: SINTOL

## 2021-10-15 RX ORDER — DONEPEZIL HYDROCHLORIDE 5 MG/1
5 TABLET, FILM COATED ORAL NIGHTLY
Qty: 90 TABLET | Refills: 1 | Status: SHIPPED | OUTPATIENT
Start: 2021-10-15 | End: 2022-02-10 | Stop reason: SDUPTHER

## 2021-10-15 RX ORDER — HYDROXYZINE PAMOATE 25 MG/1
25 CAPSULE ORAL NIGHTLY
Qty: 30 CAPSULE | Refills: 2 | Status: SHIPPED | OUTPATIENT
Start: 2021-10-15 | End: 2021-11-03 | Stop reason: SINTOL
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
## 2021-10-21 ENCOUNTER — HOSPITAL ENCOUNTER (OUTPATIENT)
Dept: MAMMOGRAPHY | Facility: HOSPITAL | Age: 71
Discharge: HOME | End: 2021-10-21
Admitting: NURSE PRACTITIONER

## 2021-10-21 DIAGNOSIS — Z12.31 SCREENING MAMMOGRAM, ENCOUNTER FOR: ICD-10-CM

## 2021-10-21 PROCEDURE — 77067 SCR MAMMO BI INCL CAD: CPT

## 2021-10-21 PROCEDURE — 77063 BREAST TOMOSYNTHESIS BI: CPT

## 2021-11-03 ENCOUNTER — TELEPHONE (OUTPATIENT)
Dept: PSYCHIATRY | Facility: CLINIC | Age: 71
End: 2021-11-03

## 2021-11-03 DIAGNOSIS — F09 MILD COGNITIVE DISORDER: Chronic | ICD-10-CM

## 2021-11-03 DIAGNOSIS — R45.851 SUICIDAL THOUGHTS: Primary | ICD-10-CM

## 2021-11-03 DIAGNOSIS — L29.9 ITCHING: Chronic | ICD-10-CM

## 2021-11-03 DIAGNOSIS — F41.8 ANXIETY ASSOCIATED WITH DEPRESSION: ICD-10-CM

## 2021-11-03 RX ORDER — INFLUENZA A VIRUS A/MICHIGAN/45/2015 X-275 (H1N1) ANTIGEN (FORMALDEHYDE INACTIVATED), INFLUENZA A VIRUS A/SINGAPORE/INFIMH-16-0019/2016 IVR-186 (H3N2) ANTIGEN (FORMALDEHYDE INACTIVATED), INFLUENZA B VIRUS B/PHUKET/3073/2013 ANTIGEN (FORMALDEHYDE INACTIVATED), AND INFLUENZA B VIRUS B/MARYLAND/15/2016 BX-69A ANTIGEN (FORMALDEHYDE INACTIVATED) 60; 60; 60; 60 UG/.7ML; UG/.7ML; UG/.7ML; UG/.7ML
INJECTION, SUSPENSION INTRAMUSCULAR
COMMUNITY
Start: 2021-10-14 | End: 2021-11-16

## 2021-11-03 RX ORDER — CELECOXIB 100 MG/1
100 CAPSULE ORAL 2 TIMES DAILY PRN
COMMUNITY
Start: 2021-09-18

## 2021-11-03 RX ORDER — DOXEPIN HYDROCHLORIDE 50 MG/1
50 CAPSULE ORAL NIGHTLY
Qty: 30 CAPSULE | Refills: 2 | Status: SHIPPED | OUTPATIENT
Start: 2021-11-03 | End: 2022-02-10 | Stop reason: SDUPTHER

## 2021-11-03 NOTE — TELEPHONE ENCOUNTER
The patient daughter called to report the patient is having SI thoughts. The patient asked  her  to put up their gun. The patient  daughter stated these thoughts started  2 weeks ago and she feels sure it's because of the medication she was started on. I advised the daughter to take her mother for na evaluation at [facility], but she thinks her medications need to be reviewed by you, because her mother has never had these thoughts before and wants to wait for your recommendations. Please advise

## 2021-11-03 NOTE — TELEPHONE ENCOUNTER
Patient notified  of provider amber recommendations. She will stop the vistaril and viibryd today. And restart the doxipin as previously prescribed. She has a follow up appointment scheduled for Friday 11/4/21

## 2021-11-05 RX ORDER — CHOLECALCIFEROL (VITAMIN D3) 50 MCG
4000 CAPSULE ORAL DAILY
Qty: 180 CAPSULE | Refills: 1 | Status: SHIPPED | OUTPATIENT
Start: 2021-11-05 | End: 2022-03-15

## 2021-11-05 RX ORDER — LEVOTHYROXINE SODIUM 112 UG/1
112 TABLET ORAL DAILY
Qty: 90 TABLET | Refills: 0 | Status: SHIPPED | OUTPATIENT
Start: 2021-11-05 | End: 2021-11-11

## 2021-11-05 NOTE — TELEPHONE ENCOUNTER
Caller: Shannon Dyson    Relationship: Self    Requested Prescriptions:   Requested Prescriptions     Pending Prescriptions Disp Refills   • Cholecalciferol (D3 Super Strength) 50 MCG (2000 UT) capsule 180 capsule 1     Sig: Take 2 capsules by mouth Daily.   • levothyroxine (SYNTHROID, LEVOTHROID) 112 MCG tablet 90 tablet 0     Sig: Take 1 tablet by mouth Daily.        Pharmacy where request should be sent: [facility] - [place] [id number] [facility] - [phone] [facility] [phone]   [phone]    Additional details provided by patient: PATIENT UNSURE OF WHICH LEVOTHYROXINE SHE NEEDED REFILLED,  WILL CALL BACK TO VERIFY.    Best call back number: [phone]     Does the patient have less than a 3 day supply:  [] Yes  [x] No    Nam Byrnes Rep   11/05/21 08:57 EDT

## 2021-11-11 RX ORDER — LEVOTHYROXINE SODIUM 112 UG/1
TABLET ORAL
Qty: 90 TABLET | Refills: 0 | Status: SHIPPED | OUTPATIENT
Start: 2021-11-11 | End: 2021-12-21

## 2021-11-12 ENCOUNTER — OFFICE VISIT (OUTPATIENT)
Dept: PSYCHIATRY | Facility: CLINIC | Age: 71
End: 2021-11-12

## 2021-11-12 VITALS — WEIGHT: 139.6 LBS | BODY MASS INDEX: 23.96 KG/M2

## 2021-11-12 DIAGNOSIS — L29.9 ITCHING: Chronic | ICD-10-CM

## 2021-11-12 DIAGNOSIS — F09 MILD COGNITIVE DISORDER: Chronic | ICD-10-CM

## 2021-11-12 DIAGNOSIS — F41.8 ANXIETY ASSOCIATED WITH DEPRESSION: Primary | Chronic | ICD-10-CM

## 2021-11-12 DIAGNOSIS — R45.851 SUICIDAL THOUGHTS: ICD-10-CM

## 2021-11-12 PROCEDURE — 99214 OFFICE O/P EST MOD 30 MIN: CPT

## 2021-11-12 RX ORDER — FLUOXETINE 10 MG/1
10 CAPSULE ORAL DAILY
Qty: 90 CAPSULE | Refills: 0 | Status: SHIPPED | OUTPATIENT
Start: 2021-11-12 | End: 2021-11-29 | Stop reason: DRUGHIGH

## 2021-11-12 RX ORDER — KETOCONAZOLE 20 MG/ML
SHAMPOO TOPICAL
COMMUNITY
Start: 2021-11-09 | End: 2022-03-15
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
## 2021-11-16 ENCOUNTER — OFFICE VISIT (OUTPATIENT)
Dept: FAMILY MEDICINE CLINIC | Facility: CLINIC | Age: 71
End: 2021-11-16

## 2021-11-16 VITALS
DIASTOLIC BLOOD PRESSURE: 82 MMHG | SYSTOLIC BLOOD PRESSURE: 146 MMHG | HEART RATE: 62 BPM | BODY MASS INDEX: 23.83 KG/M2 | WEIGHT: 139.6 LBS | HEIGHT: 64 IN | OXYGEN SATURATION: 99 % | TEMPERATURE: 97.6 F

## 2021-11-16 DIAGNOSIS — F41.8 ANXIETY ASSOCIATED WITH DEPRESSION: ICD-10-CM

## 2021-11-16 DIAGNOSIS — R41.82 ALTERED MENTAL STATUS, UNSPECIFIED ALTERED MENTAL STATUS TYPE: ICD-10-CM

## 2021-11-16 DIAGNOSIS — F40.00 AGORAPHOBIA: ICD-10-CM

## 2021-11-16 DIAGNOSIS — Z00.00 PREVENTATIVE HEALTH CARE: Primary | ICD-10-CM

## 2021-11-16 DIAGNOSIS — Z78.0 POSTMENOPAUSAL: ICD-10-CM

## 2021-11-16 DIAGNOSIS — M54.50 ACUTE RIGHT-SIDED LOW BACK PAIN WITHOUT SCIATICA: ICD-10-CM

## 2021-11-16 DIAGNOSIS — R63.0 LOSS OF APPETITE: ICD-10-CM

## 2021-11-16 PROCEDURE — 99213 OFFICE O/P EST LOW 20 MIN: CPT | Performed by: NURSE PRACTITIONER

## 2021-11-16 PROCEDURE — G0439 PPPS, SUBSEQ VISIT: HCPCS | Performed by: NURSE PRACTITIONER

## 2021-11-16 PROCEDURE — 96160 PT-FOCUSED HLTH RISK ASSMT: CPT | Performed by: NURSE PRACTITIONER

## 2021-11-16 PROCEDURE — 1170F FXNL STATUS ASSESSED: CPT | Performed by: NURSE PRACTITIONER

## 2021-11-16 PROCEDURE — 1160F RVW MEDS BY RX/DR IN RCRD: CPT | Performed by: NURSE PRACTITIONER

## 2021-11-16 PROCEDURE — 1125F AMNT PAIN NOTED PAIN PRSNT: CPT | Performed by: NURSE PRACTITIONER

## 2021-11-16 RX ORDER — LOSARTAN POTASSIUM 100 MG/1
100 TABLET ORAL DAILY
Qty: 90 TABLET | Refills: 1 | Status: SHIPPED | OUTPATIENT
Start: 2021-11-16 | End: 2022-04-29

## 2021-11-16 NOTE — PROGRESS NOTES
Shannon Dyson is a 71 y.o. female.     71-year-old white female with history of hypertension, hyperlipidemia, rheumatoid arthritis, fibrocystic breast disease, hypothyroidism, anxiety with depression, worsening memory loss and appetite who comes in today with   For Medicare wellness visit  Blood pressure 146/82 heart rate 62 she denies any chest pain, dyspnea, tachycardia or dizziness    Patient states all this Patient has been having progressive memory issues and now has developed agoraphobia and fear of riding in vehicles as well.  She also has developed a little OCD behavior since I saw her last.  She is currently going to behavioral health group in [place] but has never been to neurology.  But it happening after having covid.  Patient has not been to neurology nor has she had a CT of the head since 2018 and I am going to recommend we do that.  Behavioral health was getting ready to do a genetic study on her as well although she claims no one else in the family has any of these issues     Patient was having a lot of back pain but she states that has improved a great deal and very rarely has to use Celebrex or tramadol for pain     states rheumatology did a lot of lab work and told her her calcium was extremely high however I have no notes from rheumatology nor do I have any current lab work we will try to get information from them.  Patient has osteopenia and needs to take calcium supplementation.  If calcium is extremely elevated would be concern for cancer.    Weight is down another 7 pounds at 140 with a BMI of 24 and patient states she is eating however  states she is not eating near enough.  Patient always has some reason why she cannot eat.  I recommended health shakes 3 times a day and eating more fatty foods    Patient up-to-date on Covid vaccines and other immunizations.  Her colonoscopy was in 2019 and is due again next year.  She is up-to-date on mammogram and I am  "scheduling her DEXA scan.  She needs to schedule an eye exam          DEXA scan  Consider CT of the head neurology referral  I will obtain records from rheumatology  Schedule eye exam  Try to eat more and monitor weight closely                Right your heels while       The following portions of the patient's history were reviewed and updated as appropriate: allergies, current medications, past family history, past medical history, past social history, past surgical history and problem list.    Vitals:    11/16/21 0803   BP: 146/82   BP Location: Left arm   Patient Position: Sitting   Cuff Size: Adult   Pulse: 62   Temp: 97.6 °F (36.4 °C)   TempSrc: Temporal   SpO2: 99%   Weight: 63.3 kg (139 lb 9.6 oz)   Height: 162.6 cm (64\")     Body mass index is 23.96 kg/m².    Past Medical History:   Diagnosis Date   • Anxiety    • Breast cyst    • Fibrocystic breast    • GERD (gastroesophageal reflux disease)    • Hyperlipidemia    • Hypertension    • Hypothyroidism      Past Surgical History:   Procedure Laterality Date   • APPENDECTOMY     • BREAST BIOPSY     • BREAST SURGERY     • HYSTERECTOMY       Family History   Problem Relation Age of Onset   • Heart disease Mother    • Arthritis Mother    • Heart disease Father    • Arthritis Father      Immunization History   Administered Date(s) Administered   • COVID-19 (MODERNA) 1st, 2nd, 3rd Dose Only 01/29/2021, 02/26/2021, 11/10/2021   • FluLaval/Fluarix/Fluzone >6 11/02/2019   • Fluzone High Dose =>65 Years ([facility] ONLY) 09/28/2019, 11/02/2019   • Fluzone High-Dose 65+yrs 10/06/2020, 10/14/2021   • H1N1 Inj Preservative Free 11/19/2009   • Influenza Quad Vaccine (Inpatient) 10/08/2015   • Pneumococcal Conjugate 13-Valent (PCV13) 10/02/2019, 10/06/2020   • Shingrix 10/02/2019, 10/06/2020   • Zostavax 10/28/2016       Office Visit on 08/11/2021   Component Date Value Ref Range Status   • WBC 08/11/2021 5.0  3.4 - 10.8 x10E3/uL Final   • RBC 08/11/2021 4.20  3.77 - 5.28 " x10E6/uL Final   • Hemoglobin 08/11/2021 13.5  11.1 - 15.9 g/dL Final   • Hematocrit 08/11/2021 38.9  34.0 - 46.6 % Final   • MCV 08/11/2021 93  79 - 97 fL Final   • MCH 08/11/2021 32.1  26.6 - 33.0 pg Final   • MCHC 08/11/2021 34.7  31.5 - 35.7 g/dL Final   • RDW 08/11/2021 13.0  11.7 - 15.4 % Final   • Platelets 08/11/2021 245  150 - 450 x10E3/uL Final   • Neutrophil Rel % 08/11/2021 62  Not Estab. % Final   • Lymphocyte Rel % 08/11/2021 22  Not Estab. % Final   • Monocyte Rel % 08/11/2021 8  Not Estab. % Final   • Eosinophil Rel % 08/11/2021 6  Not Estab. % Final   • Basophil Rel % 08/11/2021 2  Not Estab. % Final   • Neutrophils Absolute 08/11/2021 3.1  1.4 - 7.0 x10E3/uL Final   • Lymphocytes Absolute 08/11/2021 1.1  0.7 - 3.1 x10E3/uL Final   • Monocytes Absolute 08/11/2021 0.4  0.1 - 0.9 x10E3/uL Final   • Eosinophils Absolute 08/11/2021 0.3  0.0 - 0.4 x10E3/uL Final   • Basophils Absolute 08/11/2021 0.1  0.0 - 0.2 x10E3/uL Final   • Immature Granulocyte Rel % 08/11/2021 0  Not Estab. % Final   • Immature Grans Absolute 08/11/2021 0.0  0.0 - 0.1 x10E3/uL Final   • Glucose 08/11/2021 90  65 - 99 mg/dL Final   • BUN 08/11/2021 35* 8 - 27 mg/dL Final   • Creatinine 08/11/2021 0.95  0.57 - 1.00 mg/dL Final   • eGFR Non  Am 08/11/2021 60  >59 mL/min/1.73 Final   • eGFR African Am 08/11/2021 69  >59 mL/min/1.73 Final    Comment: **[facility] currently reports eGFR in compliance with the current**    recommendations of the [employer]. [facility] will    update reporting as new guidelines are published from the NKF-ASN    Task force.     • BUN/Creatinine Ratio 08/11/2021 37* 12 - 28 Final   • Sodium 08/11/2021 141  134 - 144 mmol/L Final   • Potassium 08/11/2021 3.9  3.5 - 5.2 mmol/L Final   • Chloride 08/11/2021 104  96 - 106 mmol/L Final   • Total CO2 08/11/2021 21  20 - 29 mmol/L Final   • Calcium 08/11/2021 10.5* 8.7 - 10.3 mg/dL Final   • Total Protein 08/11/2021 7.3  6.0 - 8.5 g/dL Final   •  Albumin 08/11/2021 4.5  3.7 - 4.7 g/dL Final   • Globulin 08/11/2021 2.8  1.5 - 4.5 g/dL Final   • A/G Ratio 08/11/2021 1.6  1.2 - 2.2 Final   • Total Bilirubin 08/11/2021 0.8  0.0 - 1.2 mg/dL Final   • Alkaline Phosphatase 08/11/2021 112  48 - 121 IU/L Final   • AST (SGOT) 08/11/2021 45* 0 - 40 IU/L Final   • ALT (SGPT) 08/11/2021 33* 0 - 32 IU/L Final   • Total Cholesterol 08/11/2021 222* 100 - 199 mg/dL Final   • Triglycerides 08/11/2021 61  0 - 149 mg/dL Final   • HDL Cholesterol 08/11/2021 55  >39 mg/dL Final   • VLDL Cholesterol Adrian 08/11/2021 11  5 - 40 mg/dL Final   • LDL Chol Calc (NIH) 08/11/2021 156* 0 - 99 mg/dL Final   • LDL/HDL RATIO 08/11/2021 2.8  0.0 - 3.2 ratio Final    Comment:                                     LDL/HDL Ratio                                              Men  Women                                1/2 Avg.Risk  1.0    1.5                                    Avg.Risk  3.6    3.2                                 2X Avg.Risk  6.2    5.0                                 3X Avg.Risk  8.0    6.1     • TSH 08/11/2021 10.700* 0.450 - 4.500 uIU/mL Final   • Free T4 08/11/2021 0.99  0.82 - 1.77 ng/dL Final   • T3, Total 08/11/2021 74  71 - 180 ng/dL Final         Review of Systems   Constitutional: Positive for unexpected weight loss.   HENT: Negative.    Respiratory: Negative.    Gastrointestinal: Negative.    Genitourinary: Negative.    Musculoskeletal: Positive for back pain.   Skin: Negative.    Psychiatric/Behavioral: Positive for agitation.       Objective   Physical Exam  Constitutional:       Appearance: Normal appearance.   HENT:      Head: Normocephalic.   Cardiovascular:      Rate and Rhythm: Normal rate and regular rhythm.      Pulses: Normal pulses.      Heart sounds: Normal heart sounds.   Pulmonary:      Effort: Pulmonary effort is normal.      Breath sounds: Normal breath sounds.   Abdominal:      General: Bowel sounds are normal.   Musculoskeletal:         General: Normal range  of motion.   Skin:     General: Skin is warm and dry.   Neurological:      General: No focal deficit present.      Mental Status: She is alert.      Comments: Patient experiencing increased memory loss   Psychiatric:         Mood and Affect: Mood normal.      Comments: Since last visit patient has been experiencing fear of leaving the home especially driving in a vehicle and has expressed some OCD behavior         Procedures    Assessment/Plan   Diagnoses and all orders for this visit:    1. Preventative health care (Primary)  -     Hepatitis C antibody; Future    2. Postmenopausal  -     DEXA Bone Density Axial; Future    3. Anxiety associated with depression    4. Acute right-sided low back pain without sciatica    5. Loss of appetite    6. Agoraphobia    7. Altered mental status, unspecified altered mental status type  -     CT Head Without Contrast; Future  -     Ambulatory Referral to Neurology    Other orders  -     losartan (COZAAR) 100 MG tablet; Take 1 tablet by mouth Daily.  Dispense: 90 tablet; Refill: 1          Current Outpatient Medications:   •  ARIPiprazole (ABILIFY) 5 MG tablet, Take 1 tablet by mouth Daily., Disp: 30 tablet, Rfl: 1  •  atenolol (TENORMIN) 50 MG tablet, TAKE 1 TABLET EVERY DAY, Disp: 90 tablet, Rfl: 1  •  celecoxib (CeleBREX) 100 MG capsule, Take 100 mg by mouth 2 (Two) Times a Day As Needed., Disp: , Rfl:   •  Cholecalciferol (D3 Super Strength) 50 MCG (2000 UT) capsule, Take 2 capsules by mouth Daily., Disp: 180 capsule, Rfl: 1  •  donepezil (Aricept) 5 MG tablet, Take 1 tablet by mouth Every Night., Disp: 90 tablet, Rfl: 1  •  doxepin (SINEquan) 50 MG capsule, Take 1 capsule by mouth Every Night. Every evening for shower itching and depression., Disp: 30 capsule, Rfl: 2  •  FLUoxetine (PROzac) 10 MG capsule, Take 1 capsule by mouth Daily., Disp: 90 capsule, Rfl: 0  •  gemfibrozil (LOPID) 600 MG tablet, TAKE 1 TABLET TWICE DAILY BEFORE MEALS, Disp: 180 tablet, Rfl: 1  •   ketoconazole (NIZORAL) 2 % shampoo, , Disp: , Rfl:   •  levothyroxine (SYNTHROID, LEVOTHROID) 112 MCG tablet, TAKE 1 TABLET EVERY DAY, Disp: 90 tablet, Rfl: 0  •  losartan (COZAAR) 100 MG tablet, Take 1 tablet by mouth Daily., Disp: 90 tablet, Rfl: 1  •  traMADol (ULTRAM) 50 MG tablet, Take 1 tablet by mouth Every 6 (Six) Hours As Needed for Moderate Pain ., Disp: 60 tablet, Rfl: 0

## 2021-11-16 NOTE — PATIENT INSTRUCTIONS
DEXA scan  Consider CT of the head neurology referral  I will obtain records from rheumatology  Schedule eye exam  Try to eat more and monitor weight closely

## 2021-11-17 LAB — HCV AB S/CO SERPL IA: <0.1 S/CO RATIO (ref 0–0.9)

## 2021-11-22 ENCOUNTER — TELEPHONE (OUTPATIENT)
Dept: FAMILY MEDICINE CLINIC | Facility: CLINIC | Age: 71
End: 2021-11-22

## 2021-11-22 NOTE — TELEPHONE ENCOUNTER
Ana Paula Braun left a note on my desk to call pt and tell her to stop taking her calcium and recheck levels in 1 month.      Pt said she does not take calcium but she has been watching her diet and has cut out a lot of foods high in calcium.  SG

## 2021-11-24 ENCOUNTER — OFFICE VISIT (OUTPATIENT)
Dept: FAMILY MEDICINE CLINIC | Facility: CLINIC | Age: 71
End: 2021-11-24

## 2021-11-24 VITALS
WEIGHT: 140.4 LBS | RESPIRATION RATE: 16 BRPM | TEMPERATURE: 97.1 F | OXYGEN SATURATION: 99 % | BODY MASS INDEX: 23.97 KG/M2 | DIASTOLIC BLOOD PRESSURE: 70 MMHG | HEIGHT: 64 IN | HEART RATE: 62 BPM | SYSTOLIC BLOOD PRESSURE: 160 MMHG

## 2021-11-24 DIAGNOSIS — H01.004 BLEPHARITIS OF UPPER EYELIDS OF BOTH EYES, UNSPECIFIED TYPE: Primary | ICD-10-CM

## 2021-11-24 DIAGNOSIS — H01.001 BLEPHARITIS OF UPPER EYELIDS OF BOTH EYES, UNSPECIFIED TYPE: Primary | ICD-10-CM

## 2021-11-24 PROCEDURE — 99213 OFFICE O/P EST LOW 20 MIN: CPT | Performed by: NURSE PRACTITIONER

## 2021-11-24 RX ORDER — FLUCONAZOLE 100 MG/1
100 TABLET ORAL DAILY
Qty: 2 TABLET | Refills: 0 | Status: SHIPPED | OUTPATIENT
Start: 2021-11-24 | End: 2021-11-28

## 2021-11-24 RX ORDER — BACITRACIN, NEOMYCIN, POLYMYXIN B 400; 3.5; 5 [USP'U]/G; MG/G; [USP'U]/G
1 OINTMENT TOPICAL 2 TIMES DAILY
Qty: 1 EACH | Refills: 1 | Status: SHIPPED | OUTPATIENT
Start: 2021-11-24 | End: 2022-03-15

## 2021-11-24 NOTE — PATIENT INSTRUCTIONS
Poly B Mix ointment twice daily to eyelids  Diflucan 100 twice daily daily x2 days  CerVe facial cleanser wash eyelids morning and night before applying ointment

## 2021-11-24 NOTE — PROGRESS NOTES
"    Shannon Dyson is a 71 y.o. female.     71-year-old white female with history hypertension, hyperlipidemia, rheumatoid arthritis, fibrocystic breast disease, hypothyroidism, anxiety with depression, worsening memory loss and appetite who comes in today with complaints of dry skin on eyelids inside ears.  She went to dermatology 2 weeks ago who diagnosed her with seborrheic dermatitis placed her on a fungal shampoo wash for her eyes but patient states that has not helped at all.  Im going to treat her for blepharitis with antibiotic ointment.  I am also going to give her a couple Diflucan to rule out any fungal skin problems.  If this does not help then we have ruled out to possible causes.  Patient does have rheumatoid arthritis and could have a related skin issue related to that.  Next treatment will be a steroid ointment if antibiotic ointment does not help    Blood pressure 160/70 heart rate 62 she denies any chest pain, dyspnea, tachycardia or dizziness            Poly B Mix ointment twice daily to eyelids  Diflucan 100 twice daily daily x2 days  CerVe facial cleanser wash eyelids morning and night before applying ointment        The following portions of the patient's history were reviewed and updated as appropriate: allergies, current medications, past family history, past medical history, past social history, past surgical history and problem list.    Vitals:    11/24/21 1341 11/24/21 1342   BP: 146/85 160/70   BP Location: Right arm Right arm   Patient Position: Sitting Sitting   Cuff Size: Adult Adult   Pulse: 62    Resp: 16    Temp: 97.1 °F (36.2 °C)    TempSrc: Infrared    SpO2: 99%    Weight: 63.7 kg (140 lb 6.4 oz)    Height: 162.6 cm (64\")      Body mass index is 24.1 kg/m².    Past Medical History:   Diagnosis Date   • Anxiety    • Breast cyst    • Fibrocystic breast    • GERD (gastroesophageal reflux disease)    • Hyperlipidemia    • Hypertension    • Hypothyroidism      Past Surgical " History:   Procedure Laterality Date   • APPENDECTOMY     • BREAST BIOPSY     • BREAST SURGERY     • HYSTERECTOMY       Family History   Problem Relation Age of Onset   • Heart disease Mother    • Arthritis Mother    • Heart disease Father    • Arthritis Father      Immunization History   Administered Date(s) Administered   • COVID-19 (MODERNA) 1st, 2nd, 3rd Dose Only 01/29/2021, 02/26/2021, 11/10/2021   • FluLaval/Fluarix/Fluzone >6 11/02/2019   • Fluzone High Dose =>65 Years ([facility] ONLY) 09/28/2019, 11/02/2019   • Fluzone High-Dose 65+yrs 10/06/2020, 10/14/2021   • H1N1 Inj Preservative Free 11/19/2009   • Influenza Quad Vaccine (Inpatient) 10/08/2015   • Pneumococcal Conjugate 13-Valent (PCV13) 10/02/2019, 10/06/2020   • Shingrix 10/02/2019, 10/06/2020   • Zostavax 10/28/2016       Results Encounter on 11/21/2021   Component Date Value Ref Range Status   • Hep C Virus Ab 11/23/2021 <0.1  0.0 - 0.9 s/co ratio Final    Comment:                                   Negative:     < 0.8                               Indeterminate: 0.8 - 0.9                                    Positive:     > 0.9   The CDC recommends that a positive HCV antibody result   be followed up with a HCV Nucleic Acid Amplification   test ([id number]).           Review of Systems   Constitutional: Negative.    HENT:        Dry scaly eyelids   Respiratory: Negative.    Cardiovascular: Negative.    Gastrointestinal: Negative.    Genitourinary: Negative.    Musculoskeletal: Negative.    Neurological: Negative.    Psychiatric/Behavioral: Negative.        Objective   Physical Exam  Constitutional:       Appearance: Normal appearance.   HENT:      Head: Normocephalic.   Cardiovascular:      Rate and Rhythm: Normal rate and regular rhythm.      Pulses: Normal pulses.      Heart sounds: Normal heart sounds.   Pulmonary:      Effort: Pulmonary effort is normal.      Breath sounds: Normal breath sounds.   Abdominal:      General: Bowel sounds are normal.    Skin:     Comments: Eyelids slightly puffy and skin is scaly and cracked   Neurological:      General: No focal deficit present.      Mental Status: She is alert and oriented to person, place, and time.   Psychiatric:         Mood and Affect: Mood normal.         Behavior: Behavior normal.         Procedures    Assessment/Plan   Diagnoses and all orders for this visit:    1. Blepharitis of upper eyelids of both eyes, unspecified type (Primary)    Other orders  -     neomycin-bacitracin-polymyxin (NEOSPORIN) 400-5-5000 ointment; Apply 1 application topically to the appropriate area as directed 2 (Two) Times a Day. Use twice daily to eye lids for two weeks  Dispense: 1 each; Refill: 1  -     fluconazole (Diflucan) 100 MG tablet; Take 1 tablet by mouth Daily for 4 days.  Dispense: 2 tablet; Refill: 0          Current Outpatient Medications:   •  ARIPiprazole (ABILIFY) 5 MG tablet, Take 1 tablet by mouth Daily., Disp: 30 tablet, Rfl: 1  •  atenolol (TENORMIN) 50 MG tablet, TAKE 1 TABLET EVERY DAY, Disp: 90 tablet, Rfl: 1  •  celecoxib (CeleBREX) 100 MG capsule, Take 100 mg by mouth 2 (Two) Times a Day As Needed., Disp: , Rfl:   •  Cholecalciferol (D3 Super Strength) 50 MCG (2000 UT) capsule, Take 2 capsules by mouth Daily., Disp: 180 capsule, Rfl: 1  •  donepezil (Aricept) 5 MG tablet, Take 1 tablet by mouth Every Night., Disp: 90 tablet, Rfl: 1  •  doxepin (SINEquan) 50 MG capsule, Take 1 capsule by mouth Every Night. Every evening for shower itching and depression., Disp: 30 capsule, Rfl: 2  •  FLUoxetine (PROzac) 10 MG capsule, Take 1 capsule by mouth Daily., Disp: 90 capsule, Rfl: 0  •  gemfibrozil (LOPID) 600 MG tablet, TAKE 1 TABLET TWICE DAILY BEFORE MEALS, Disp: 180 tablet, Rfl: 1  •  ketoconazole (NIZORAL) 2 % shampoo, , Disp: , Rfl:   •  levothyroxine (SYNTHROID, LEVOTHROID) 112 MCG tablet, TAKE 1 TABLET EVERY DAY, Disp: 90 tablet, Rfl: 0  •  losartan (COZAAR) 100 MG tablet, Take 1 tablet by mouth Daily.,  Disp: 90 tablet, Rfl: 1  •  fluconazole (Diflucan) 100 MG tablet, Take 1 tablet by mouth Daily for 4 days., Disp: 2 tablet, Rfl: 0  •  neomycin-bacitracin-polymyxin (NEOSPORIN) 400-5-5000 ointment, Apply 1 application topically to the appropriate area as directed 2 (Two) Times a Day. Use twice daily to eye lids for two weeks, Disp: 1 each, Rfl: 1  •  traMADol (ULTRAM) 50 MG tablet, Take 1 tablet by mouth Every 6 (Six) Hours As Needed for Moderate Pain ., Disp: 60 tablet, Rfl: 0

## 2021-11-28 NOTE — DISCHARGE INSTRUCTIONS
Eat potassium rich food daily.  Drink plenty of fluids.  Stop hydrochlorothiazide.  Return for shortness of breath, increased weakness or any other concerns.  
No

## 2021-11-29 ENCOUNTER — OFFICE VISIT (OUTPATIENT)
Dept: PSYCHIATRY | Facility: CLINIC | Age: 71
End: 2021-11-29

## 2021-11-29 DIAGNOSIS — F33.9 MAJOR DEPRESSION, RECURRENT, CHRONIC (HCC): Chronic | ICD-10-CM

## 2021-11-29 DIAGNOSIS — F40.00 AGORAPHOBIA: ICD-10-CM

## 2021-11-29 DIAGNOSIS — F41.8 ANXIETY ASSOCIATED WITH DEPRESSION: Primary | ICD-10-CM

## 2021-11-29 DIAGNOSIS — R45.851 SUICIDAL THOUGHTS: ICD-10-CM

## 2021-11-29 PROCEDURE — 99214 OFFICE O/P EST MOD 30 MIN: CPT

## 2021-11-29 RX ORDER — ARIPIPRAZOLE 5 MG/1
5 TABLET ORAL DAILY
Qty: 90 TABLET | Refills: 0 | Status: SHIPPED | OUTPATIENT
Start: 2021-11-29 | End: 2022-02-07 | Stop reason: SDUPTHER

## 2021-11-29 RX ORDER — FLUOXETINE HYDROCHLORIDE 20 MG/1
20 CAPSULE ORAL DAILY
Qty: 90 CAPSULE | Refills: 0 | Status: SHIPPED | OUTPATIENT
Start: 2021-11-29 | End: 2021-12-13 | Stop reason: SINTOL

## 2021-11-29 RX ORDER — SILICONES/ADHESIVE TAPE
COMBINATION PACKAGE (EA) TOPICAL
COMMUNITY
Start: 2021-11-24 | End: 2022-03-15

## 2021-12-13 ENCOUNTER — HOSPITAL ENCOUNTER (EMERGENCY)
Facility: HOSPITAL | Age: 71
Discharge: HOME | End: 2021-12-13
Admitting: EMERGENCY MEDICINE

## 2021-12-13 ENCOUNTER — TELEPHONE (OUTPATIENT)
Dept: PSYCHIATRY | Facility: CLINIC | Age: 71
End: 2021-12-13

## 2021-12-13 VITALS
DIASTOLIC BLOOD PRESSURE: 76 MMHG | TEMPERATURE: 97.6 F | BODY MASS INDEX: 23.26 KG/M2 | HEART RATE: 67 BPM | SYSTOLIC BLOOD PRESSURE: 113 MMHG | RESPIRATION RATE: 16 BRPM | HEIGHT: 64 IN | OXYGEN SATURATION: 98 % | WEIGHT: 136.24 LBS

## 2021-12-13 DIAGNOSIS — H01.9: Primary | ICD-10-CM

## 2021-12-13 DIAGNOSIS — T50.905A ADVERSE EFFECT OF DRUG, INITIAL ENCOUNTER: ICD-10-CM

## 2021-12-13 DIAGNOSIS — F41.8 ANXIETY ASSOCIATED WITH DEPRESSION: ICD-10-CM

## 2021-12-13 DIAGNOSIS — S00.202A: Primary | ICD-10-CM

## 2021-12-13 DIAGNOSIS — R45.851 SUICIDAL THOUGHTS: Primary | ICD-10-CM

## 2021-12-13 LAB
ALBUMIN SERPL-MCNC: 4.6 G/DL (ref 3.5–5.2)
ALBUMIN/GLOB SERPL: 1.4 G/DL
ALP SERPL-CCNC: 146 U/L (ref 39–117)
ALT SERPL W P-5'-P-CCNC: 11 U/L (ref 1–33)
ANION GAP SERPL CALCULATED.3IONS-SCNC: 11 MMOL/L (ref 5–15)
AST SERPL-CCNC: 24 U/L (ref 1–32)
BASOPHILS # BLD AUTO: 0 10*3/MM3 (ref 0–0.2)
BASOPHILS NFR BLD AUTO: 0.8 % (ref 0–1.5)
BILIRUB SERPL-MCNC: 0.5 MG/DL (ref 0–1.2)
BUN SERPL-MCNC: 19 MG/DL (ref 8–23)
BUN/CREAT SERPL: 25 (ref 7–25)
CALCIUM SPEC-SCNC: 10.4 MG/DL (ref 8.6–10.5)
CHLORIDE SERPL-SCNC: 107 MMOL/L (ref 98–107)
CO2 SERPL-SCNC: 22 MMOL/L (ref 22–29)
CREAT SERPL-MCNC: 0.76 MG/DL (ref 0.57–1)
DEPRECATED RDW RBC AUTO: 42 FL (ref 37–54)
EOSINOPHIL # BLD AUTO: 0.1 10*3/MM3 (ref 0–0.4)
EOSINOPHIL NFR BLD AUTO: 2.9 % (ref 0.3–6.2)
ERYTHROCYTE [DISTWIDTH] IN BLOOD BY AUTOMATED COUNT: 13.4 % (ref 12.3–15.4)
GFR SERPL CREATININE-BSD FRML MDRD: 75 ML/MIN/1.73
GLOBULIN UR ELPH-MCNC: 3.4 GM/DL
GLUCOSE SERPL-MCNC: 99 MG/DL (ref 65–99)
HCT VFR BLD AUTO: 40.7 % (ref 34–46.6)
HGB BLD-MCNC: 14 G/DL (ref 12–15.9)
HOLD SPECIMEN: NORMAL
LYMPHOCYTES # BLD AUTO: 0.9 10*3/MM3 (ref 0.7–3.1)
LYMPHOCYTES NFR BLD AUTO: 17.7 % (ref 19.6–45.3)
MCH RBC QN AUTO: 30.8 PG (ref 26.6–33)
MCHC RBC AUTO-ENTMCNC: 34.3 G/DL (ref 31.5–35.7)
MCV RBC AUTO: 89.8 FL (ref 79–97)
MONOCYTES # BLD AUTO: 0.4 10*3/MM3 (ref 0.1–0.9)
MONOCYTES NFR BLD AUTO: 7.1 % (ref 5–12)
NEUTROPHILS NFR BLD AUTO: 3.7 10*3/MM3 (ref 1.7–7)
NEUTROPHILS NFR BLD AUTO: 71.5 % (ref 42.7–76)
NRBC BLD AUTO-RTO: 0.1 /100 WBC (ref 0–0.2)
PLATELET # BLD AUTO: 260 10*3/MM3 (ref 140–450)
PMV BLD AUTO: 8.4 FL (ref 6–12)
POTASSIUM SERPL-SCNC: 4.1 MMOL/L (ref 3.5–5.2)
PROT SERPL-MCNC: 8 G/DL (ref 6–8.5)
RBC # BLD AUTO: 4.53 10*6/MM3 (ref 3.77–5.28)
SODIUM SERPL-SCNC: 140 MMOL/L (ref 136–145)
WBC NRBC COR # BLD: 5.1 10*3/MM3 (ref 3.4–10.8)

## 2021-12-13 PROCEDURE — 99283 EMERGENCY DEPT VISIT LOW MDM: CPT

## 2021-12-13 PROCEDURE — 80053 COMPREHEN METABOLIC PANEL: CPT | Performed by: NURSE PRACTITIONER

## 2021-12-13 PROCEDURE — 85025 COMPLETE CBC W/AUTO DIFF WBC: CPT | Performed by: NURSE PRACTITIONER

## 2021-12-13 RX ORDER — ESCITALOPRAM OXALATE 10 MG/1
10 TABLET ORAL DAILY
Qty: 30 TABLET | Refills: 1 | Status: SHIPPED | OUTPATIENT
Start: 2021-12-13 | End: 2022-02-07 | Stop reason: SDUPTHER

## 2021-12-13 RX ORDER — SODIUM CHLORIDE 0.9 % (FLUSH) 0.9 %
10 SYRINGE (ML) INJECTION AS NEEDED
Status: DISCONTINUED | OUTPATIENT
Start: 2021-12-13 | End: 2021-12-13 | Stop reason: HOSPADM

## 2021-12-13 RX ORDER — ERYTHROMYCIN 5 MG/G
1 OINTMENT OPHTHALMIC ONCE
Status: COMPLETED | OUTPATIENT
Start: 2021-12-13 | End: 2021-12-13

## 2021-12-13 RX ADMIN — ERYTHROMYCIN 1 APPLICATION: 5 OINTMENT OPHTHALMIC at 09:16

## 2021-12-13 NOTE — DISCHARGE INSTRUCTIONS
Up with Mr. Turner for further management of your psychiatric meds follow-up with dermatology for further management of eyelid injury and infection.    Use erythromycin ophthalmic to both eyelids 3 times a day until gone    Follow-up with primary care if you are unable to get into dermatology quickly    Return if worse

## 2021-12-13 NOTE — ED PROVIDER NOTES
Subjective   Patient is a 29-year-old female who has a history of depression and anxiety and is seeing a Jigar Turner nurse practitioner in psychiatry for management of these problems.  She states she has had multiple medication changes recently and has recently developed some swelling around her eyes and stated that she noticed that her eyelids had become inflamed and had started to crack and now are painful.  She states there is some drainage from her eyelids bilaterally.-She states that she was recently diagnosed with a high calcium levels but her kidney functions have been fine.  She has also a history of hyperlipidemia hypertension and hypothyroidism as well as GERD-    She denies pain at the time of my exam she has a very flat affect          Review of Systems   Constitutional: Negative for chills, fatigue and fever.   HENT: Negative for congestion, tinnitus and trouble swallowing.    Eyes: Positive for redness. Negative for photophobia and discharge.   Respiratory: Negative for cough and shortness of breath.    Cardiovascular: Negative for chest pain and palpitations.   Gastrointestinal: Negative for abdominal pain, diarrhea, nausea and vomiting.   Genitourinary: Negative for dysuria, frequency and urgency.   Musculoskeletal: Negative for back pain, joint swelling and myalgias.   Skin: Negative for rash.   Neurological: Negative for dizziness and headaches.   Psychiatric/Behavioral: Negative for confusion.   All other systems reviewed and are negative.      Past Medical History:   Diagnosis Date   • Anxiety    • Breast cyst    • Fibrocystic breast    • GERD (gastroesophageal reflux disease)    • Hyperlipidemia    • Hypertension    • Hypothyroidism        Allergies   Allergen Reactions   • Codeine Nausea And Vomiting   • Penicillin G Unknown (See Comments)   • Amlodipine Swelling       Past Surgical History:   Procedure Laterality Date   • APPENDECTOMY     • BREAST BIOPSY     • BREAST SURGERY     •  HYSTERECTOMY         Family History   Problem Relation Age of Onset   • Heart disease Mother    • Arthritis Mother    • Heart disease Father    • Arthritis Father        Social History     Socioeconomic History   • Marital status:    Tobacco Use   • Smoking status: Never Smoker   • Smokeless tobacco: Never Used   Vaping Use   • Vaping Use: Never used   Substance and Sexual Activity   • Alcohol use: No   • Drug use: No   • Sexual activity: Yes     Partners: Male     Birth control/protection: None           Objective   Physical Exam  Vitals reviewed.   Constitutional:       General: She is not in acute distress.     Appearance: Normal appearance. She is well-developed. She is not ill-appearing.   HENT:      Head: Normocephalic and atraumatic.      Right Ear: External ear normal.      Left Ear: External ear normal.      Nose: Nose normal.      Mouth/Throat:      Mouth: Mucous membranes are moist.   Eyes:      General: Vision grossly intact.      Extraocular Movements: Extraocular movements intact.      Conjunctiva/sclera: Conjunctivae normal.      Pupils: Pupils are equal, round, and reactive to light.     Cardiovascular:      Rate and Rhythm: Normal rate and regular rhythm.      Heart sounds: Normal heart sounds.   Pulmonary:      Effort: Pulmonary effort is normal. No respiratory distress.      Breath sounds: Normal breath sounds. No wheezing.   Abdominal:      General: Bowel sounds are normal. There is no distension.      Palpations: Abdomen is soft. There is no mass.      Tenderness: There is no abdominal tenderness. There is no guarding or rebound.   Musculoskeletal:         General: No deformity. Normal range of motion.      Cervical back: Normal range of motion and neck supple.   Skin:     General: Skin is warm and dry.      Capillary Refill: Capillary refill takes less than 2 seconds.   Neurological:      Mental Status: She is alert and oriented to person, place, and time.      GCS: GCS eye subscore is  "4. GCS verbal subscore is 5. GCS motor subscore is 6.      Cranial Nerves: No cranial nerve deficit.      Sensory: No sensory deficit.      Deep Tendon Reflexes: Reflexes normal.         Procedures           ED Course      /76   Pulse 67   Temp 97.6 °F (36.4 °C) (Temporal)   Resp 16   Ht 162.6 cm (64\")   Wt 61.8 kg (136 lb 3.9 oz)   LMP  (LMP Unknown)   SpO2 98%   BMI 23.39 kg/m²   Labs Reviewed   COMPREHENSIVE METABOLIC PANEL - Abnormal; Notable for the following components:       Result Value    Alkaline Phosphatase 146 (*)     All other components within normal limits    Narrative:     GFR Normal >60  Chronic Kidney Disease <60  Kidney Failure <15     CBC WITH AUTO DIFFERENTIAL - Abnormal; Notable for the following components:    Lymphocyte % 17.7 (*)     All other components within normal limits   CBC AND DIFFERENTIAL    Narrative:     The following orders were created for panel order CBC & Differential.  Procedure                               Abnormality         Status                     ---------                               -----------         ------                     CBC Auto Differential[[id number]]        Abnormal            Final result                 Please view results for these tests on the individual orders.   EXTRA TUBES    Narrative:     The following orders were created for panel order Extra Tubes.  Procedure                               Abnormality         Status                     ---------                               -----------         ------                     Gold Top - SST[[id number]]                                   Final result                 Please view results for these tests on the individual orders.   GOLD TOP - SST     Medications   sodium chloride 0.9 % flush 10 mL (has no administration in time range)   erythromycin (ROMYCIN) ophthalmic ointment 1 application (1 application Both Eyes Given 12/13/21 0916)     No radiology results for the last day          "                                    MDM  Number of Diagnoses or Management Options  Adverse effect of drug, initial encounter  Superficial injury of left eyelid with infection, initial encounter  Diagnosis management comments: Patient had above exam and IV was established blood work was obtained patient was found of a normal kidney function as well as a normal calcium level today.  Erythromycin ophthalmic was placed on the eyelids and the patient was advised to follow-up with dermatology or primary care for further evaluation and treatment she was also told to see her psychiatric NP Johnny-for further manage of her psych meds which she feels is the cause of her eye dryness today.    She was sent home with the erythromycin ophthalmic advised to place it on the eyelids 3-4 times a day until gone.      She verbalized understood discharge instructions and the need to follow-up with primary care psychiatry and dermatology she was agreeable to this plan of care       Amount and/or Complexity of Data Reviewed  Clinical lab tests: reviewed    Risk of Complications, Morbidity, and/or Mortality  Presenting problems: minimal  Diagnostic procedures: minimal  Management options: minimal    Patient Progress  Patient progress: improved      Final diagnoses:   Superficial injury of left eyelid with infection, initial encounter   Adverse effect of drug, initial encounter       ED Disposition  ED Disposition     ED Disposition Condition Comment    Discharge Stable           [facility]  [address] [place] [id number] [phone]  In 3 days  call for an appt at the [facility] appt.         Medication List      No changes were made to your prescriptions during this visit.          Stephanie Padilla, APRN  12/13/21 1123

## 2021-12-21 RX ORDER — LEVOTHYROXINE SODIUM 112 UG/1
TABLET ORAL
Qty: 90 TABLET | Refills: 0 | Status: SHIPPED | OUTPATIENT
Start: 2021-12-21 | End: 2022-04-01 | Stop reason: SDUPTHER

## 2022-01-24 DIAGNOSIS — M25.551 RIGHT HIP PAIN: Primary | ICD-10-CM

## 2022-02-07 DIAGNOSIS — F33.9 MAJOR DEPRESSION, RECURRENT, CHRONIC: Chronic | ICD-10-CM

## 2022-02-07 DIAGNOSIS — F41.8 ANXIETY ASSOCIATED WITH DEPRESSION: ICD-10-CM

## 2022-02-07 DIAGNOSIS — R45.851 SUICIDAL THOUGHTS: ICD-10-CM

## 2022-02-07 NOTE — TELEPHONE ENCOUNTER
Rx Refill Note  Requested Prescriptions     Pending Prescriptions Disp Refills   • escitalopram (Lexapro) 10 MG tablet 30 tablet 1     Sig: Take 1 tablet by mouth Daily.   • ARIPiprazole (ABILIFY) 5 MG tablet 90 tablet 0     Sig: Take 1 tablet by mouth Daily.      Last office visit with prescribing clinician: 11/29/2021      Next office visit with prescribing clinician: 2/10/2022            Iva Saunders MA  02/07/22, 12:03 EST     Pt wants [facility] now

## 2022-02-08 DIAGNOSIS — R45.851 SUICIDAL THOUGHTS: ICD-10-CM

## 2022-02-08 DIAGNOSIS — F41.8 ANXIETY ASSOCIATED WITH DEPRESSION: Chronic | ICD-10-CM

## 2022-02-08 RX ORDER — CITALOPRAM 10 MG/1
10 TABLET ORAL DAILY
Qty: 90 TABLET | Refills: 1 | OUTPATIENT
Start: 2022-02-08

## 2022-02-09 RX ORDER — FLUOXETINE 10 MG/1
10 CAPSULE ORAL DAILY
Qty: 90 CAPSULE | Refills: 0 | OUTPATIENT
Start: 2022-02-09

## 2022-02-09 RX ORDER — ARIPIPRAZOLE 5 MG/1
5 TABLET ORAL DAILY
Qty: 90 TABLET | Refills: 0 | Status: SHIPPED | OUTPATIENT
Start: 2022-02-09 | End: 2022-05-11

## 2022-02-09 RX ORDER — ESCITALOPRAM OXALATE 10 MG/1
10 TABLET ORAL DAILY
Qty: 30 TABLET | Refills: 1 | Status: SHIPPED | OUTPATIENT
Start: 2022-02-09 | End: 2022-02-10 | Stop reason: SDUPTHER

## 2022-02-10 ENCOUNTER — OFFICE VISIT (OUTPATIENT)
Dept: PSYCHIATRY | Facility: CLINIC | Age: 72
End: 2022-02-10

## 2022-02-10 ENCOUNTER — LAB (OUTPATIENT)
Dept: LAB | Facility: HOSPITAL | Age: 72
End: 2022-02-10

## 2022-02-10 DIAGNOSIS — Z79.899 LONG TERM CURRENT USE OF ANTIPSYCHOTIC MEDICATION: ICD-10-CM

## 2022-02-10 DIAGNOSIS — F40.00 AGORAPHOBIA: Chronic | ICD-10-CM

## 2022-02-10 DIAGNOSIS — F33.42 DEPRESSION, MAJOR, RECURRENT, IN COMPLETE REMISSION: Chronic | ICD-10-CM

## 2022-02-10 DIAGNOSIS — F09 MILD COGNITIVE DISORDER: Chronic | ICD-10-CM

## 2022-02-10 DIAGNOSIS — F41.1 GAD (GENERALIZED ANXIETY DISORDER): Chronic | ICD-10-CM

## 2022-02-10 DIAGNOSIS — L29.9 ITCHING: Chronic | ICD-10-CM

## 2022-02-10 DIAGNOSIS — F41.1 GAD (GENERALIZED ANXIETY DISORDER): Primary | Chronic | ICD-10-CM

## 2022-02-10 PROBLEM — R45.851 SUICIDAL THOUGHTS: Status: RESOLVED | Noted: 2021-11-03 | Resolved: 2022-02-10

## 2022-02-10 LAB
ANION GAP SERPL CALCULATED.3IONS-SCNC: 6.8 MMOL/L (ref 5–15)
BUN SERPL-MCNC: 17 MG/DL (ref 8–23)
BUN/CREAT SERPL: 21 (ref 7–25)
CALCIUM SPEC-SCNC: 10.8 MG/DL (ref 8.6–10.5)
CHLORIDE SERPL-SCNC: 105 MMOL/L (ref 98–107)
CHOLEST SERPL-MCNC: 230 MG/DL (ref 0–200)
CO2 SERPL-SCNC: 29.2 MMOL/L (ref 22–29)
CREAT SERPL-MCNC: 0.81 MG/DL (ref 0.57–1)
GFR SERPL CREATININE-BSD FRML MDRD: 69 ML/MIN/1.73
GLUCOSE SERPL-MCNC: 97 MG/DL (ref 65–99)
HDLC SERPL-MCNC: 54 MG/DL (ref 40–60)
LDLC SERPL CALC-MCNC: 154 MG/DL (ref 0–100)
LDLC/HDLC SERPL: 2.8 {RATIO}
POTASSIUM SERPL-SCNC: 4.5 MMOL/L (ref 3.5–5.2)
SODIUM SERPL-SCNC: 141 MMOL/L (ref 136–145)
TRIGL SERPL-MCNC: 123 MG/DL (ref 0–150)
VLDLC SERPL-MCNC: 22 MG/DL (ref 5–40)

## 2022-02-10 PROCEDURE — 80048 BASIC METABOLIC PNL TOTAL CA: CPT

## 2022-02-10 PROCEDURE — 36415 COLL VENOUS BLD VENIPUNCTURE: CPT

## 2022-02-10 PROCEDURE — 80061 LIPID PANEL: CPT

## 2022-02-10 PROCEDURE — 99214 OFFICE O/P EST MOD 30 MIN: CPT

## 2022-02-10 RX ORDER — DOXEPIN HYDROCHLORIDE 50 MG/1
50 CAPSULE ORAL NIGHTLY
Qty: 90 CAPSULE | Refills: 1 | Status: SHIPPED | OUTPATIENT
Start: 2022-02-10 | End: 2022-04-20 | Stop reason: DRUGHIGH

## 2022-02-10 RX ORDER — DONEPEZIL HYDROCHLORIDE 5 MG/1
5 TABLET, FILM COATED ORAL NIGHTLY
Qty: 90 TABLET | Refills: 1 | Status: SHIPPED | OUTPATIENT
Start: 2022-02-10 | End: 2022-03-21 | Stop reason: SDUPTHER

## 2022-02-10 RX ORDER — ESCITALOPRAM OXALATE 10 MG/1
10 TABLET ORAL DAILY
Qty: 90 TABLET | Refills: 1 | Status: SHIPPED | OUTPATIENT
Start: 2022-02-10 | End: 2022-05-17 | Stop reason: DRUGHIGH

## 2022-02-10 NOTE — PROGRESS NOTES
Subjective   Shannon Dyson is a 71 y.o. female who presents today for follow-up medication management.    Chief Complaint: Occasional anxiety about ring getting stuck.    History of Present Illness:  -Previous post-COVID psych complaints of dep/anx and SI.   -C/o of explosive behavior at initial eval with anxiety bordering paranoia.  -Mild cognitive impairment via MMSE w/ memory complaints.  -Abilify had some efficacy at 5mg and continued.  -Viibryd and Fluoxetine failed due to side effects.  -Perpetual concerns she will not get back to her old self.  -9/28/21-11/29/21 in previous notes.    2/10/22: Pt reports doing a lot better, feeling her old self again. She feels good enough she would like to not change any medications at this time.  -Hasn't started counseling but doesn't feel it is needed now.   -Reports occasional anxieties, such as fear her wedding ring will get stuck on her finger, even though it never has. Doesn't want to increase medicine for this at this time because anxiety is much more controlled than previously.  -Denies continued anx/dep concern, SI/HI, or AVH.    The following portions of the patient's history were reviewed and updated as appropriate: allergies, current medications, past family history, past medical history, past social history, past surgical history and problem list.    PAST PSYCHIATRIC HISTORY  Diagnosis treated:  MDD, Anxiety/Panic Disorder  Treatment Type:  Medication Management by PCP.  GeneSight: Normal folic acid conversion.  Prior Psychiatric Medications:  Celexa - ineffective  Viibryd - SI  Fluoxetine - conjunctivitis.  Hydroxyzine - effective for sleep and post-bath itching, but doxepin depression benefit was lost.  Lexapro - efficacy  *Can't recall any meds prior to Celexa.  Support Groups:  None  Sequelae Of Mental Disorder:  social isolation, emotional distress, family dysfunction.    Interval History  Significantly improved on Lexapro.    Side  Effects  Denies    Past Medical History:  Past Medical History:   Diagnosis Date   • Anxiety    • Breast cyst    • Fibrocystic breast    • GERD (gastroesophageal reflux disease)    • Hyperlipidemia    • Hypertension    • Hypothyroidism      Social History:  Social History     Socioeconomic History   • Marital status:    Tobacco Use   • Smoking status: Never Smoker   • Smokeless tobacco: Never Used   Vaping Use   • Vaping Use: Never used   Substance and Sexual Activity   • Alcohol use: No   • Drug use: No   • Sexual activity: Yes     Partners: Male     Birth control/protection: None     Family History:  Family History   Problem Relation Age of Onset   • Heart disease Mother    • Arthritis Mother    • Heart disease Father    • Arthritis Father      Past Surgical History:  Past Surgical History:   Procedure Laterality Date   • APPENDECTOMY     • BREAST BIOPSY     • BREAST SURGERY     • HYSTERECTOMY       Problem List:  Patient Active Problem List   Diagnosis   • Allergic rhinitis   • ALBA (generalized anxiety disorder)   • Bunion   • Carpal tunnel syndrome   • Degenerative joint disease   • Dependent edema   • Dermatitis   • Elevated antinuclear antibody (COMPA) level   • Encounter for immunization   • Encounter for screening for osteoporosis   • Fibrocystic breast disease   • Gastroesophageal reflux disease   • Hyperlipidemia   • Hypertension   • Hypokalemia   • Hypovitaminosis D   • Hypothyroidism   • Low back pain   • Myalgia   • Neurodermatitis   • Osteoporosis   • Other long term (current) drug therapy   • Mouth pain   • Elevated serum creatinine   • Mild cognitive disorder   • Loss of appetite   • Pain in gums   • Itching   • Agoraphobia   • Depression, major, recurrent, in complete remission (HCC)     Allergy:   Allergies   Allergen Reactions   • Codeine Nausea And Vomiting   • Penicillin G Unknown (See Comments)   • Amlodipine Swelling      Discontinued Medications:  Medications Discontinued During This  Encounter   Medication Reason   • donepezil (Aricept) 5 MG tablet Reorder   • doxepin (SINEquan) 50 MG capsule Reorder   • escitalopram (Lexapro) 10 MG tablet Reorder     Current Medications:   Current Outpatient Medications   Medication Sig Dispense Refill   • ARIPiprazole (ABILIFY) 5 MG tablet Take 1 tablet by mouth Daily. 90 tablet 0   • atenolol (TENORMIN) 50 MG tablet TAKE 1 TABLET EVERY DAY 90 tablet 1   • celecoxib (CeleBREX) 100 MG capsule Take 100 mg by mouth 2 (Two) Times a Day As Needed.     • Cholecalciferol (D3 Super Strength) 50 MCG (2000 UT) capsule Take 2 capsules by mouth Daily. 180 capsule 1   • donepezil (Aricept) 5 MG tablet Take 1 tablet by mouth Every Night. 90 tablet 1   • doxepin (SINEquan) 50 MG capsule Take 1 capsule by mouth Every Night. Every evening for shower itching and depression. 90 capsule 1   • escitalopram (Lexapro) 10 MG tablet Take 1 tablet by mouth Daily. 90 tablet 1   • gemfibrozil (LOPID) 600 MG tablet TAKE 1 TABLET TWICE DAILY BEFORE MEALS 180 tablet 1   • ketoconazole (NIZORAL) 2 % shampoo      • levothyroxine (SYNTHROID, LEVOTHROID) 112 MCG tablet TAKE 1 TABLET BY MOUTH DAILY 90 tablet 0   • losartan (COZAAR) 100 MG tablet Take 1 tablet by mouth Daily. 90 tablet 1   • neomycin-bacitracin-polymyxin (NEOSPORIN) 400-5-5000 ointment Apply 1 application topically to the appropriate area as directed 2 (Two) Times a Day. Use twice daily to eye lids for two weeks 1 each 1   • Neosporin + Pain/Itch/Scar 1 % ointment      • traMADol (ULTRAM) 50 MG tablet Take 1 tablet by mouth Every 6 (Six) Hours As Needed for Moderate Pain . 60 tablet 0     No current facility-administered medications for this visit.     Psychological ROS: positive for - concentration & memory difficulties  negative for - disorientation, hallucinations, or hostility, anxiety and depression, sleep disturbances, SI, mood swings or irritability.    Physical Exam: Pt brighter and in NAD.  not currently breastfeeding.    Wt: 142.6 pounds (9/28/21); 139.4lbs (11/12/21)    Mental Status Exam:   Orientation:  To person, Place, Time and Situation  Memory: Recent and remote memory Deficits.  Mood/Affect: Euthymic  Suicidal Ideations: Denied  Homicidal Ideations:  None  Hallucinations: None  Delusions:  None  Obsessions: None  Behavior and Psychomotor Activity: Normal  Speech:  Minimal  Thought Process: Goal directed  Associations: Intact  Thought Content: Normal  Language: name objects and repeat phrases  Concentration and computation: Fair  Attention Span: Fair  Fund of Knowledge: Fair  Reliability: good  Insight into mental health: Fair  Judgement: Fair  Impulse Control:  fair  Hygiene: good  Cooperation:  Cooperative  Eye Contact:  Fair  Physical/Medical Issues: Yes, HLD, HTN, age-related cognitive decline.    MSE reviewed and accepted with changes from the previous visit.    PHQ-9 Depression Screening  Little interest or pleasure in doing things? 0   Feeling down, depressed, or hopeless? 0   Trouble falling or staying asleep, or sleeping too much? 0   Feeling tired or having little energy? 0   Poor appetite or overeating? 1   Feeling bad about yourself - or that you are a failure or have let yourself or your family down? 0   Trouble concentrating on things, such as reading the newspaper or watching television? 0   Moving or speaking so slowly that other people could have noticed? Or the opposite - being so fidgety or restless that you have been moving around a lot more than usual? 0   Thoughts that you would be better off dead, or of hurting yourself in some way? 0   PHQ-9 Total Score 1   If you checked off any problems, how difficult have these problems made it for you to do your work, take care of things at home, or get along with other people? Not difficult at all   Feeling nervous, anxious or on edge: Not at all  Not being able to stop or control worrying: Not at all  Worrying too much about different things: Not at  all  Trouble Relaxing: Not at all  Being so restless that it is hard to sit still: Not at all  Feeling afraid as if something awful might happen: Several days  Becoming easily annoyed or irritable: Not at all  ALBA 7 Total Score: 1  If you checked any problems, how difficult have these problems made it for you to do your work, take care of things at home, or get along with other people: Not difficult at all  PHQ-2: 0; depression in remission. Previously: 20...19 at initial eval.  ALBA-7: 1; anxiety in remission. Previously: 19...7...20 at initial eval.  MDQ: Negative; bipolar not indicated (8/21/21)  MMSE: 23; mild cognitive impairment (8/21/21)    Never smoker    I advised Shannon of the risks of tobacco use.     Lab Results: Reviewed.    Assessment/Plan   Diagnoses and all orders for this visit:    1. ALBA (generalized anxiety disorder) (Primary)  -     escitalopram (Lexapro) 10 MG tablet; Take 1 tablet by mouth Daily.  Dispense: 90 tablet; Refill: 1  -     doxepin (SINEquan) 50 MG capsule; Take 1 capsule by mouth Every Night. Every evening for shower itching and depression.  Dispense: 90 capsule; Refill: 1  -     Basic Metabolic Panel; Future  -     Lipid Panel; Future    2. Depression, major, recurrent, in complete remission (HCC)  -     escitalopram (Lexapro) 10 MG tablet; Take 1 tablet by mouth Daily.  Dispense: 90 tablet; Refill: 1  -     doxepin (SINEquan) 50 MG capsule; Take 1 capsule by mouth Every Night. Every evening for shower itching and depression.  Dispense: 90 capsule; Refill: 1  -     Basic Metabolic Panel; Future  -     Lipid Panel; Future    3. Agoraphobia  -     escitalopram (Lexapro) 10 MG tablet; Take 1 tablet by mouth Daily.  Dispense: 90 tablet; Refill: 1  -     doxepin (SINEquan) 50 MG capsule; Take 1 capsule by mouth Every Night. Every evening for shower itching and depression.  Dispense: 90 capsule; Refill: 1  -     Basic Metabolic Panel; Future  -     Lipid Panel; Future    4. Itching  -      doxepin (SINEquan) 50 MG capsule; Take 1 capsule by mouth Every Night. Every evening for shower itching and depression.  Dispense: 90 capsule; Refill: 1  -     Basic Metabolic Panel; Future    5. Mild cognitive disorder  -     donepezil (Aricept) 5 MG tablet; Take 1 tablet by mouth Every Night.  Dispense: 90 tablet; Refill: 1  -     Basic Metabolic Panel; Future    6. Long term current use of antipsychotic medication  -     Lipid Panel; Future    PHQ-2: 0; depression in remission. Previously: 20...19 at initial eval.  ALBA-7: 1; anxiety in remission. Previously: 19...7...20 at initial eval.  MDQ: Negative; bipolar not indicated (8/21/21)  MMSE: 23; mild cognitive impairment (8/21/21)  -Refills sent for medications.  -Cont. 10mg Lexapro daily.  -Cont. 50mg Doxepin daily for depression and leg itching.  -Cont. 5mg Abilify nightly for depression.  -Counseling continues to be recommended for stability.  -Cont 5mg Donepezil for mild age-related cognitive decline (MMSE=23) due to reported benefit. Consider Namenda in the future.  -Notified pt about labs being WNL, and to f/u with PCP about cholesterol being high, but it is not significantly worse than prior to starting Abilify.  -F/U in  to determine continued efficacy of Lexapro, Doxepin, and Abilify. Consider removing Abilify and increasing Lexapro. See if counseling obtained, and monitor wt and BP.    Visit Diagnoses:    ICD-10-CM ICD-9-CM   1. ALBA (generalized anxiety disorder)  F41.1 300.02   2. Depression, major, recurrent, in complete remission (HCC)  F33.42 296.36   3. Agoraphobia  F40.00 300.22   4. Itching  L29.9 698.9   5. Mild cognitive disorder  F09 294.9   6. Long term current use of antipsychotic medication  Z79.899 V58.69      TREATMENT PLAN/GOALS: In the short-term, the patient is to continue supportive psychotherapy efforts and medications as indicated. Treatment and medication options were discussed during today's visit. Long-term the goal will be to  control symptoms so they do not negatively interfere with other aspects of the patient's life. Prognosis is optimistic with implementation of the current treatment plan. Patient ackowledged and verbally consented to the treatment plan and was educated on the importance of compliance with treatment and follow-up appointments.    MEDICATION ISSUES:  INSPECT reviewed 2/10/22, and is as expected. Tramadol filled 8/23/21 and Clonazepam filled 8/11/21.  Discussed medication options and treatment plan of prescribed medication as well as the risks, benefits, and side effects including potential falls, possible impaired driving, and metabolic adversities among others. Patient counseled on a multimodal approach with healthy nutrition, healthy sleep, regular physical activity, social activity, counseling, and medication taking part in his/her psychiatric management. Patient is agreeable to the plan, and he/she agreed to call the office with any worsening of symptoms or onset of side effects. Patient is agreeable to call 911 or go to the nearest ER should he/she begin having SI/HI or self-harming behavior.     Assisted patient in processing above session content; acknowledged and normalized patient’s thoughts, feelings, and concerns. Reviewed positive coping skills and behavior management in session with positive framing of thoughts. Allowed patient to freely discuss issues without interruption or judgment. Provided safe, confidential environment to facilitate the development of positive therapeutic relationship and encourage open and honest communication.    MEDS ORDERED DURING VISIT:  New Medications Ordered This Visit   Medications   • escitalopram (Lexapro) 10 MG tablet     Sig: Take 1 tablet by mouth Daily.     Dispense:  90 tablet     Refill:  1   • donepezil (Aricept) 5 MG tablet     Sig: Take 1 tablet by mouth Every Night.     Dispense:  90 tablet     Refill:  1   • doxepin (SINEquan) 50 MG capsule     Sig: Take 1  capsule by mouth Every Night. Every evening for shower itching and depression.     Dispense:  90 capsule     Refill:  1     Return in about 3 months (around 5/10/2022) for Recheck.      This document has been electronically signed by Mamadou Goss PA-C  February 27, 2022 10:31 EST    EMR Dragon transcription disclaimer:  Part of this note may be an electronic transcription/translation of spoken language to printed text using the Dragon Dictation System.

## 2022-03-10 ENCOUNTER — TELEPHONE (OUTPATIENT)
Dept: PSYCHIATRY | Facility: CLINIC | Age: 72
End: 2022-03-10

## 2022-03-10 NOTE — TELEPHONE ENCOUNTER
Patient called stating the Rx called in for Doxipen was for 1 capsule at night and she normally takes 2 capsules at night and will not have enough to last her.

## 2022-03-15 ENCOUNTER — OFFICE VISIT (OUTPATIENT)
Dept: FAMILY MEDICINE CLINIC | Facility: CLINIC | Age: 72
End: 2022-03-15

## 2022-03-15 VITALS
TEMPERATURE: 97.6 F | DIASTOLIC BLOOD PRESSURE: 89 MMHG | HEIGHT: 64 IN | BODY MASS INDEX: 26.7 KG/M2 | SYSTOLIC BLOOD PRESSURE: 137 MMHG | WEIGHT: 156.4 LBS | OXYGEN SATURATION: 98 % | HEART RATE: 67 BPM

## 2022-03-15 DIAGNOSIS — N39.0 ACUTE URINARY TRACT INFECTION: ICD-10-CM

## 2022-03-15 DIAGNOSIS — R31.9 HEMATURIA, UNSPECIFIED TYPE: ICD-10-CM

## 2022-03-15 DIAGNOSIS — E03.9 ACQUIRED HYPOTHYROIDISM: Primary | ICD-10-CM

## 2022-03-15 LAB
BILIRUB BLD-MCNC: NEGATIVE MG/DL
CLARITY, POC: ABNORMAL
COLOR UR: ABNORMAL
EXPIRATION DATE: ABNORMAL
GLUCOSE UR STRIP-MCNC: NEGATIVE MG/DL
KETONES UR QL STRIP: ABNORMAL
LEUKOCYTE EST, POC: ABNORMAL
Lab: ABNORMAL
NITRITE UR-MCNC: NEGATIVE MG/ML
PH UR: 6 [PH] (ref 5–8)
PROT UR STRIP-MCNC: ABNORMAL MG/DL
RBC # UR STRIP: ABNORMAL /UL
SP GR UR: 1.02 (ref 1–1.03)
UROBILINOGEN UR QL: NORMAL

## 2022-03-15 PROCEDURE — 81003 URINALYSIS AUTO W/O SCOPE: CPT | Performed by: NURSE PRACTITIONER

## 2022-03-15 PROCEDURE — 99213 OFFICE O/P EST LOW 20 MIN: CPT | Performed by: NURSE PRACTITIONER

## 2022-03-15 RX ORDER — SULFAMETHOXAZOLE AND TRIMETHOPRIM 800; 160 MG/1; MG/1
1 TABLET ORAL 2 TIMES DAILY
Qty: 14 TABLET | Refills: 0 | Status: SHIPPED | OUTPATIENT
Start: 2022-03-15 | End: 2022-03-21

## 2022-03-15 RX ORDER — PHENAZOPYRIDINE HYDROCHLORIDE 100 MG/1
100 TABLET, FILM COATED ORAL 3 TIMES DAILY PRN
Qty: 6 TABLET | Refills: 0 | Status: SHIPPED | OUTPATIENT
Start: 2022-03-15 | End: 2022-04-06

## 2022-03-15 NOTE — PATIENT INSTRUCTIONS
Bactrim DS twice daily x7 days  Pyridium 100 mg 3 times daily x2 days  Increase fluid intake  Follow-up urine in 2 weeks  Schedule eye exam

## 2022-03-16 NOTE — PROGRESS NOTES
"Chief Complaint  Altered Mental Status (/)    Subjective            Shannon Dyson presents to [facility] for Memory Loss  History of Present Illness    New patient referred by Ana Paula VASQUEZ for memory loss.   No memory troubles prior to the covid  Developed phobia of staying home alone or traveling in car, these are better.   Memory issues started fall of 2020 when she got covid per spouse,   and has gotten a little better she does have a family h/o Dementia   Her mother passed at age 83, onset of dementia about age 80  Has 4 siblings no dementia.     She feels that she is maybe getting a little better     Maximum   Score Patient's   Score Questions   5 5 \"What is the year?Season?Date?Day of the week?Month?\"   5 4 \"Where are we now: State?County?Town/city?Hospital?Floor?\"   3 3 3 Unrelated objects Number of trials:___   5 5 Count backward from 100 by sevens or spell WORLD backwards   3 2 Name 3 things from above   2 2 Identify 2 objects   1 1 Repeat the phrase: No ifs, ands,or buts.   3 3 Take paper in right hand, fold it in half, and put it on the floor.   1 1 Please read this and do what it says. \"Close your eyes\"   1 1  Make up and write a sentence about anything. Noun and verb   1 1 Copy this picture 10 angles must be present.   30 28 Total MMSE       === CT BRAIN/HEAD WO/C 11/19/21=====  NORMAL PER ANA PAULA PRATER APRN      ====previous note 11/16/21 ana paula prater aprn====  Patient states all this Patient has been having progressive memory issues and now has developed agoraphobia and fear of riding in vehicles as well.  She also has developed a little OCD behavior since I saw her last.  She is currently going to behavioral health group in [place] but has never been to neurology.  But it happening after having covid.  Patient has not been to neurology nor has she had a CT of the head since 2018 and I am going to recommend we do that.  Behavioral health was getting ready " to do a genetic study on her as well although she claims no one else in the family has any of these issues      Patient was having a lot of back pain but she states that has improved a great deal and very rarely has to use Celebrex or tramadol for pain     Family History   Problem Relation Age of Onset   • Heart disease Mother    • Arthritis Mother    • Heart disease Father    • Arthritis Father        Past Medical History:   Diagnosis Date   • Anxiety    • Breast cyst    • Fibrocystic breast    • GERD (gastroesophageal reflux disease)    • Hyperlipidemia    • Hypertension    • Hypothyroidism        Social History     Socioeconomic History   • Marital status:    Tobacco Use   • Smoking status: Never Smoker   • Smokeless tobacco: Never Used   Vaping Use   • Vaping Use: Never used   Substance and Sexual Activity   • Alcohol use: No   • Drug use: No   • Sexual activity: Yes     Partners: Male     Birth control/protection: None         Current Outpatient Medications:   •  ARIPiprazole (ABILIFY) 5 MG tablet, Take 1 tablet by mouth Daily., Disp: 90 tablet, Rfl: 0  •  atenolol (TENORMIN) 50 MG tablet, TAKE 1 TABLET EVERY DAY, Disp: 90 tablet, Rfl: 1  •  celecoxib (CeleBREX) 100 MG capsule, Take 100 mg by mouth 2 (Two) Times a Day As Needed., Disp: , Rfl:   •  donepezil (Aricept) 10 MG tablet, Take 1 tablet by mouth Every Night., Disp: 90 tablet, Rfl: 3  •  doxepin (SINEquan) 50 MG capsule, Take 1 capsule by mouth Every Night. Every evening for shower itching and depression., Disp: 90 capsule, Rfl: 1  •  escitalopram (Lexapro) 10 MG tablet, Take 1 tablet by mouth Daily., Disp: 90 tablet, Rfl: 1  •  gemfibrozil (LOPID) 600 MG tablet, TAKE 1 TABLET TWICE DAILY BEFORE MEALS, Disp: 180 tablet, Rfl: 1  •  levothyroxine (SYNTHROID, LEVOTHROID) 112 MCG tablet, TAKE 1 TABLET BY MOUTH DAILY, Disp: 90 tablet, Rfl: 0  •  losartan (COZAAR) 100 MG tablet, Take 1 tablet by mouth Daily., Disp: 90 tablet, Rfl: 1  •  phenazopyridine  "(Pyridium) 100 MG tablet, Take 1 tablet by mouth 3 (Three) Times a Day As Needed for Bladder Spasms., Disp: 6 tablet, Rfl: 0    Review of Systems   HENT: Positive for hearing loss and tinnitus.    Eyes: Positive for redness and itching.   Cardiovascular: Positive for leg swelling.   Gastrointestinal: Positive for abdominal distention and constipation.   Endocrine: Positive for polydipsia and polyphagia.   Musculoskeletal: Positive for back pain and neck stiffness.   Neurological: Positive for tremors.   Psychiatric/Behavioral: Positive for confusion, decreased concentration and dysphoric mood. The patient is nervous/anxious.             Objective   Vital Signs:   /76   Pulse 64   Temp 97.3 °F (36.3 °C) (Temporal)   Ht 162.6 cm (64\")   Wt 70.8 kg (156 lb)   BMI 26.78 kg/m²     Physical Exam  Vitals reviewed.   Constitutional:       Appearance: Normal appearance.   Cardiovascular:      Pulses: Normal pulses.   Pulmonary:      Effort: Pulmonary effort is normal. No respiratory distress.   Neurological:      Mental Status: She is alert and oriented to person, place, and time.      Gait: Gait is intact. Tandem walk normal.      Deep Tendon Reflexes: Strength normal.      Reflex Scores:       Bicep reflexes are 1+ on the right side and 1+ on the left side.       Patellar reflexes are 1+ on the right side and 1+ on the left side.  Psychiatric:         Mood and Affect: Mood normal.        Result Review :                Neurologic Exam     Mental Status   Oriented to person, place, and time.   Attention: normal.   Level of consciousness: alert  Knowledge: good.     Cranial Nerves     CN II   Visual fields full to confrontation.     CN V   Facial sensation intact.     CN VII   Facial expression full, symmetric.     CN VIII   CN VIII normal.     Motor Exam   Muscle bulk: normal  Right arm tone: normal  Right leg tone: normal    Strength   Strength 5/5 throughout.     Sensory Exam   Light touch normal.     Gait, " Coordination, and Reflexes     Gait  Gait: normal    Coordination   Tandem walking coordination: normal    Tremor   Action tremor: left arm and right arm    Reflexes   Reflexes 2+ except as noted.   Right biceps: 1+  Left biceps: 1+  Right patellar: 1+  Left patellar: 1+Mild action tremor               Assessment and Plan    Diagnoses and all orders for this visit:    1. Mild cognitive disorder (Primary)  -     donepezil (Aricept) 10 MG tablet; Take 1 tablet by mouth Every Night.  Dispense: 90 tablet; Refill: 3    2. Depression, major, recurrent, in complete remission (HCC)    possible post covid syndrome with some memory and cognitive problems,   May be getting better at this point.   Recommend increasing the Aricept to 10 mg.   No need for mri at this time  As she may be stable or improved will defer neuropsych. testing at this time.     Follow Up   Return in about 1 year (around 3/21/2023).  Patient was given instructions and counseling regarding her condition or for health maintenance advice. Please see specific information pulled into the AVS if appropriate.         This document has been electronically signed by Joseph Seipel, MD on March 21, 2022 15:02 EDT

## 2022-03-17 LAB
BACTERIA UR CULT: NORMAL
BACTERIA UR CULT: NORMAL

## 2022-03-21 ENCOUNTER — OFFICE VISIT (OUTPATIENT)
Dept: NEUROLOGY | Facility: CLINIC | Age: 72
End: 2022-03-21

## 2022-03-21 VITALS
DIASTOLIC BLOOD PRESSURE: 76 MMHG | SYSTOLIC BLOOD PRESSURE: 115 MMHG | TEMPERATURE: 97.3 F | HEIGHT: 64 IN | HEART RATE: 64 BPM | WEIGHT: 156 LBS | BODY MASS INDEX: 26.63 KG/M2

## 2022-03-21 DIAGNOSIS — F09 MILD COGNITIVE DISORDER: Primary | Chronic | ICD-10-CM

## 2022-03-21 DIAGNOSIS — F33.42 DEPRESSION, MAJOR, RECURRENT, IN COMPLETE REMISSION: Chronic | ICD-10-CM

## 2022-03-21 PROCEDURE — 99204 OFFICE O/P NEW MOD 45 MIN: CPT | Performed by: PSYCHIATRY & NEUROLOGY

## 2022-03-21 RX ORDER — DONEPEZIL HYDROCHLORIDE 10 MG/1
10 TABLET, FILM COATED ORAL NIGHTLY
Qty: 90 TABLET | Refills: 3 | Status: SHIPPED | OUTPATIENT
Start: 2022-03-21 | End: 2023-02-13

## 2022-03-23 ENCOUNTER — TELEPHONE (OUTPATIENT)
Dept: FAMILY MEDICINE CLINIC | Facility: CLINIC | Age: 72
End: 2022-03-23

## 2022-03-23 RX ORDER — CHOLECALCIFEROL (VITAMIN D3) 125 MCG
1 CAPSULE ORAL DAILY
Qty: 90 TABLET | Refills: 2 | Status: SHIPPED | OUTPATIENT
Start: 2022-03-23

## 2022-03-23 NOTE — TELEPHONE ENCOUNTER
As her note says she was told she should start taking it again meaning she stopped taking it and the update was made to the med list    I reordered it

## 2022-03-23 NOTE — TELEPHONE ENCOUNTER
Caller: Shannon Dyson    Relationship: Self    Best call back number: [phone]    What is the best time to reach you: ANY     Who are you requesting to speak with (clinical staff, provider,  specific staff member): CLINICAL       What was the call regarding: PATIENT CALLED AND WANTING TO HAVE HER VITAMIN D 3 REFILLED. PATIENT WAS TOLD BY DOCTOR JOSEPH SEIPEL SHE SHOULD START TAKING HER VITAMIN D.     VITAMIN D IS NOT ON PATIENTS MEDICATION LIST. PATIENT WOULD LIKE TO KNOW IF SHE WAS TAKEN OFF THE MEDICATION.      PLEASE CALL AND ADVISE PATIENT   Do you require a callback: YES

## 2022-04-01 NOTE — TELEPHONE ENCOUNTER
Caller: Kiel Shannon NILAM    Relationship: Self    Best call back number: [phone]    Requested Prescriptions:   Requested Prescriptions     Pending Prescriptions Disp Refills   • levothyroxine (SYNTHROID, LEVOTHROID) 112 MCG tablet 90 tablet 0     Sig: Take 1 tablet by mouth Daily.        Pharmacy where request should be sent: [facility] - [place] [id number] [facility] - [phone]  - [phone] FX     Additional details provided by patient: PATIENT STATES SHE HAS ABOUT 5-8 DAYS MEDICATION REMAINING    Does the patient have less than a 3 day supply:  [] Yes  [x] No    Nam Patten Rep   04/01/22 10:45 EDT

## 2022-04-04 RX ORDER — LEVOTHYROXINE SODIUM 112 UG/1
112 TABLET ORAL DAILY
Qty: 90 TABLET | Refills: 0 | Status: SHIPPED | OUTPATIENT
Start: 2022-04-04 | End: 2022-07-01

## 2022-04-06 ENCOUNTER — OFFICE VISIT (OUTPATIENT)
Dept: FAMILY MEDICINE CLINIC | Facility: CLINIC | Age: 72
End: 2022-04-06

## 2022-04-06 VITALS
HEIGHT: 64 IN | DIASTOLIC BLOOD PRESSURE: 80 MMHG | TEMPERATURE: 97.3 F | BODY MASS INDEX: 26.91 KG/M2 | WEIGHT: 157.6 LBS | SYSTOLIC BLOOD PRESSURE: 126 MMHG | HEART RATE: 66 BPM | OXYGEN SATURATION: 98 %

## 2022-04-06 DIAGNOSIS — B35.1 NAIL FUNGUS: Primary | ICD-10-CM

## 2022-04-06 PROCEDURE — 99213 OFFICE O/P EST LOW 20 MIN: CPT | Performed by: NURSE PRACTITIONER

## 2022-04-06 RX ORDER — TERBINAFINE HYDROCHLORIDE 250 MG/1
250 TABLET ORAL DAILY
Qty: 90 TABLET | Refills: 0 | Status: SHIPPED | OUTPATIENT
Start: 2022-04-06 | End: 2022-08-25

## 2022-04-06 NOTE — PROGRESS NOTES
"    Shannon Dyson is a 71 y.o. female.     71-year-old white female with history hypertension, hyperlipidemia, rheumatoid arthritis, fibrocystic breast disease, hypothyroidism, anxiety with depression and worsening memory loss who comes in today with complaints of toenail fungus.  Patient has had a fungus on great her nails and majority of other toes as well.  There is an option of laser treatments which she has about $400 for 4 treatments or Lamisil which is usually 6 to 8 weeks if is going to work.  Patient opted for Lamisil we will try this if not she said she may consider laser treatment.  I advised her to buy nail polish that would not cause nail fungus and there is a apparatus to put in shoes that will kill any fungus in shoes and she is going to try to purchase it online as well  Vital signs are stable            Lamisil 250 mg daily  Consider laser therapy if Lamisil does not work  Fungal free nail polish  Consider purchasing device to kill fungus in your shoes         The following portions of the patient's history were reviewed and updated as appropriate: allergies, current medications, past family history, past medical history, past social history, past surgical history and problem list.    Vitals:    04/06/22 1001   BP: 126/80   BP Location: Right arm   Patient Position: Sitting   Cuff Size: Adult   Pulse: 66   Temp: 97.3 °F (36.3 °C)   TempSrc: Temporal   SpO2: 98%   Weight: 71.5 kg (157 lb 9.6 oz)   Height: 162.6 cm (64\")     Body mass index is 27.05 kg/m².    Past Medical History:   Diagnosis Date   • Anxiety    • Breast cyst    • Fibrocystic breast    • GERD (gastroesophageal reflux disease)    • Hyperlipidemia    • Hypertension    • Hypothyroidism      Past Surgical History:   Procedure Laterality Date   • APPENDECTOMY     • BREAST BIOPSY     • BREAST SURGERY     • HYSTERECTOMY       Family History   Problem Relation Age of Onset   • Heart disease Mother    • Arthritis Mother    • Heart disease " Father    • Arthritis Father      Immunization History   Administered Date(s) Administered   • COVID-19 (MODERNA) 1st, 2nd, 3rd Dose Only 01/29/2021, 02/26/2021, 11/10/2021   • FluLaval/Fluarix/Fluzone >6 11/02/2019   • Fluzone High Dose =>65 Years ([facility] ONLY) 09/28/2019, 11/02/2019   • Fluzone High-Dose 65+yrs 10/06/2020, 10/14/2021   • H1N1 Inj Preservative Free 11/19/2009   • Influenza Quad Vaccine (Inpatient) 10/08/2015   • Pneumococcal Conjugate 13-Valent (PCV13) 10/02/2019, 10/06/2020   • Shingrix 10/02/2019, 10/06/2020   • Zostavax 10/28/2016       Office Visit on 03/15/2022   Component Date Value Ref Range Status   • Color 03/15/2022 Red (A) Yellow, Straw, Dark Yellow, [place] Final   • Clarity, UA 03/15/2022 Cloudy (A) Clear Final   • Specific Gravity  03/15/2022 1.025  1.005 - 1.030 Final   • pH, Urine 03/15/2022 6.0  5.0 - 8.0 Final   • Leukocytes 03/15/2022 Trace (A) Negative Final   • Nitrite, UA 03/15/2022 Negative  Negative Final   • Protein, POC 03/15/2022 1+ (A) Negative mg/dL Final   • Glucose, UA 03/15/2022 Negative  Negative, 1000 mg/dL (3+) mg/dL Final   • Ketones 03/15/2022 n   Final   • Urobilinogen, UA 03/15/2022 Normal  Normal Final   • Bilirubin 03/15/2022 Negative  Negative Final   • Blood, UA 03/15/2022 3+ (A) Negative Final   • Lot Number 03/15/2022 [id number]   Final   • Expiration Date 03/15/2022 8/24/23   Final   • Urine Culture 03/15/2022 Final report   Final   • Result 1 03/15/2022 Comment   Final    Comment: Mixed urogenital jose  Less than 10,000 colonies/mL           Review of Systems   Constitutional: Negative.    HENT: Negative.    Respiratory: Negative.    Cardiovascular: Negative.    Gastrointestinal: Negative.    Genitourinary: Negative.    Musculoskeletal: Negative.    Skin:        Nail fungus   Neurological: Negative.    Psychiatric/Behavioral: Negative.        Objective   Physical Exam  Constitutional:       Appearance: Normal appearance.   Cardiovascular:      Rate  and Rhythm: Normal rate and regular rhythm.      Pulses: Normal pulses.      Heart sounds: Normal heart sounds.   Pulmonary:      Effort: Pulmonary effort is normal.      Breath sounds: Normal breath sounds.   Abdominal:      General: Bowel sounds are normal.   Musculoskeletal:         General: Normal range of motion.   Skin:     General: Skin is warm and dry.      Comments: Nails covered with white fungus greater toes are worse   Neurological:      General: No focal deficit present.      Mental Status: She is alert and oriented to person, place, and time.   Psychiatric:         Mood and Affect: Mood normal.         Behavior: Behavior normal.         Procedures    Assessment/Plan   Diagnoses and all orders for this visit:    1. Nail fungus (Primary)    Other orders  -     terbinafine (lamiSIL) 250 MG tablet; Take 1 tablet by mouth Daily.  Dispense: 90 tablet; Refill: 0          Current Outpatient Medications:   •  ARIPiprazole (ABILIFY) 5 MG tablet, Take 1 tablet by mouth Daily., Disp: 90 tablet, Rfl: 0  •  atenolol (TENORMIN) 50 MG tablet, TAKE 1 TABLET EVERY DAY, Disp: 90 tablet, Rfl: 1  •  celecoxib (CeleBREX) 100 MG capsule, Take 100 mg by mouth 2 (Two) Times a Day As Needed., Disp: , Rfl:   •  Cholecalciferol (Vitamin D3) 50 MCG (2000 UT) tablet, Take 1 tablet by mouth Daily., Disp: 90 tablet, Rfl: 2  •  donepezil (Aricept) 10 MG tablet, Take 1 tablet by mouth Every Night., Disp: 90 tablet, Rfl: 3  •  doxepin (SINEquan) 50 MG capsule, Take 1 capsule by mouth Every Night. Every evening for shower itching and depression., Disp: 90 capsule, Rfl: 1  •  escitalopram (Lexapro) 10 MG tablet, Take 1 tablet by mouth Daily., Disp: 90 tablet, Rfl: 1  •  gemfibrozil (LOPID) 600 MG tablet, TAKE 1 TABLET TWICE DAILY BEFORE MEALS, Disp: 180 tablet, Rfl: 1  •  levothyroxine (SYNTHROID, LEVOTHROID) 112 MCG tablet, Take 1 tablet by mouth Daily., Disp: 90 tablet, Rfl: 0  •  losartan (COZAAR) 100 MG tablet, Take 1 tablet by mouth  Daily., Disp: 90 tablet, Rfl: 1  •  terbinafine (lamiSIL) 250 MG tablet, Take 1 tablet by mouth Daily., Disp: 90 tablet, Rfl: 0           CHRISTINA Zamorano 4/6/2022 10:19 EDT  This note has been electronically signed

## 2022-04-06 NOTE — PATIENT INSTRUCTIONS
Lamisil 250 mg daily  Consider laser therapy if Lamisil does not work  Fungal free nail polish  Consider purchasing device to kill fungus in your shoes

## 2022-04-19 ENCOUNTER — TELEPHONE (OUTPATIENT)
Dept: PSYCHIATRY | Facility: CLINIC | Age: 72
End: 2022-04-19

## 2022-04-19 DIAGNOSIS — F40.00 AGORAPHOBIA: ICD-10-CM

## 2022-04-19 DIAGNOSIS — F41.1 GAD (GENERALIZED ANXIETY DISORDER): Chronic | ICD-10-CM

## 2022-04-19 DIAGNOSIS — F33.42 DEPRESSION, MAJOR, RECURRENT, IN COMPLETE REMISSION: Chronic | ICD-10-CM

## 2022-04-19 DIAGNOSIS — L29.9 ITCHING: Primary | Chronic | ICD-10-CM

## 2022-04-19 NOTE — TELEPHONE ENCOUNTER
Patient requesting New Rx  for Doxipen. She said her current Rx is for 1 tablet at night and She takes 2 tablets at night.

## 2022-04-20 RX ORDER — DOXEPIN HYDROCHLORIDE 100 MG/1
100 CAPSULE ORAL NIGHTLY
Qty: 90 CAPSULE | Refills: 1 | Status: SHIPPED | OUTPATIENT
Start: 2022-04-20 | End: 2022-09-15

## 2022-04-21 ENCOUNTER — PATIENT OUTREACH (OUTPATIENT)
Dept: CASE MANAGEMENT | Facility: OTHER | Age: 72
End: 2022-04-21
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
## 2022-04-29 RX ORDER — LOSARTAN POTASSIUM 100 MG/1
TABLET ORAL
Qty: 90 TABLET | Refills: 1 | Status: SHIPPED | OUTPATIENT
Start: 2022-04-29 | End: 2022-12-04

## 2022-05-11 DIAGNOSIS — F33.9 MAJOR DEPRESSION, RECURRENT, CHRONIC: Chronic | ICD-10-CM

## 2022-05-11 DIAGNOSIS — F41.8 ANXIETY ASSOCIATED WITH DEPRESSION: ICD-10-CM

## 2022-05-11 RX ORDER — ARIPIPRAZOLE 5 MG/1
TABLET ORAL
Qty: 90 TABLET | Refills: 0 | Status: SHIPPED | OUTPATIENT
Start: 2022-05-11 | End: 2022-07-19 | Stop reason: SDUPTHER

## 2022-05-11 RX ORDER — GEMFIBROZIL 600 MG/1
TABLET, FILM COATED ORAL
Qty: 180 TABLET | Refills: 1 | Status: SHIPPED | OUTPATIENT
Start: 2022-05-11 | End: 2023-02-12

## 2022-05-11 RX ORDER — ATENOLOL 50 MG/1
TABLET ORAL
Qty: 90 TABLET | Refills: 1 | Status: SHIPPED | OUTPATIENT
Start: 2022-05-11 | End: 2022-12-04

## 2022-05-17 ENCOUNTER — OFFICE VISIT (OUTPATIENT)
Dept: PSYCHIATRY | Facility: CLINIC | Age: 72
End: 2022-05-17

## 2022-05-17 VITALS
HEIGHT: 64 IN | OXYGEN SATURATION: 97 % | HEART RATE: 61 BPM | SYSTOLIC BLOOD PRESSURE: 130 MMHG | DIASTOLIC BLOOD PRESSURE: 78 MMHG | BODY MASS INDEX: 27.31 KG/M2 | WEIGHT: 160 LBS

## 2022-05-17 DIAGNOSIS — F33.42 DEPRESSION, MAJOR, RECURRENT, IN COMPLETE REMISSION: Primary | Chronic | ICD-10-CM

## 2022-05-17 DIAGNOSIS — F41.1 GAD (GENERALIZED ANXIETY DISORDER): Chronic | ICD-10-CM

## 2022-05-17 DIAGNOSIS — F40.00 AGORAPHOBIA: ICD-10-CM

## 2022-05-17 PROCEDURE — 99214 OFFICE O/P EST MOD 30 MIN: CPT

## 2022-05-17 RX ORDER — ESCITALOPRAM OXALATE 20 MG/1
20 TABLET ORAL DAILY
Qty: 90 TABLET | Refills: 0 | Status: SHIPPED | OUTPATIENT
Start: 2022-05-17 | End: 2022-07-19 | Stop reason: SDUPTHER

## 2022-05-20 ENCOUNTER — OFFICE VISIT (OUTPATIENT)
Dept: FAMILY MEDICINE CLINIC | Facility: CLINIC | Age: 72
End: 2022-05-20

## 2022-05-20 VITALS
WEIGHT: 160.6 LBS | TEMPERATURE: 98.5 F | SYSTOLIC BLOOD PRESSURE: 108 MMHG | HEIGHT: 64 IN | DIASTOLIC BLOOD PRESSURE: 68 MMHG | OXYGEN SATURATION: 96 % | HEART RATE: 63 BPM | BODY MASS INDEX: 27.42 KG/M2

## 2022-05-20 DIAGNOSIS — J02.9 PHARYNGITIS, UNSPECIFIED ETIOLOGY: ICD-10-CM

## 2022-05-20 DIAGNOSIS — R05.9 COUGH: Primary | ICD-10-CM

## 2022-05-20 DIAGNOSIS — J02.9 SORE THROAT: ICD-10-CM

## 2022-05-20 LAB
EXPIRATION DATE: NORMAL
EXPIRATION DATE: NORMAL
FLUAV AG UPPER RESP QL IA.RAPID: NOT DETECTED
FLUBV AG UPPER RESP QL IA.RAPID: NOT DETECTED
INTERNAL CONTROL: NORMAL
INTERNAL CONTROL: NORMAL
Lab: NORMAL
Lab: NORMAL
S PYO AG THROAT QL: NEGATIVE
SARS-COV-2 AG UPPER RESP QL IA.RAPID: NOT DETECTED

## 2022-05-20 PROCEDURE — 87880 STREP A ASSAY W/OPTIC: CPT | Performed by: NURSE PRACTITIONER

## 2022-05-20 PROCEDURE — 99213 OFFICE O/P EST LOW 20 MIN: CPT | Performed by: NURSE PRACTITIONER

## 2022-05-20 PROCEDURE — 87428 SARSCOV & INF VIR A&B AG IA: CPT | Performed by: NURSE PRACTITIONER

## 2022-05-20 RX ORDER — AZITHROMYCIN 250 MG/1
TABLET, FILM COATED ORAL
Qty: 6 TABLET | Refills: 0 | Status: SHIPPED | OUTPATIENT
Start: 2022-05-20 | End: 2022-07-06

## 2022-05-20 RX ORDER — AZITHROMYCIN 250 MG/1
TABLET, FILM COATED ORAL
Qty: 6 TABLET | Refills: 0 | Status: SHIPPED | OUTPATIENT
Start: 2022-05-20 | End: 2022-05-20 | Stop reason: SDUPTHER

## 2022-05-20 NOTE — PROGRESS NOTES
"Chief Complaint  Sore Throat    Subjective          Shannon Dyson presents to [facility]      History of Present Illness    Kamryn is a 71-year-old female patient of Ana Paula Braun who presents today with complaints of sore throat.    She reports a sore throat began yesterday with a slight dry cough.  She denies, N/V/D alterations. She does report her  has been coughing for a while but has had no other symptoms.  She reports the pain is sharp swallowing and eating she did have COVID around 2020 and she is up-to-date on her COVID vaccines.    We will test patient for strep and COVID due to the sore throat.      Objective     Vital Signs:   /68 (BP Location: Left arm, Patient Position: Sitting, Cuff Size: Adult)   Pulse 63   Temp 98.5 °F (36.9 °C) (Oral)   Ht 162.6 cm (64.02\")   Wt 72.8 kg (160 lb 9.6 oz)   SpO2 96%   BMI 27.55 kg/m²           Physical Exam  Constitutional:       Appearance: Normal appearance. She is well-developed.      Comments: Wearing a face mask     HENT:      Head: Normocephalic and atraumatic.      Right Ear: Tympanic membrane, ear canal and external ear normal. There is no impacted cerumen.      Left Ear: Tympanic membrane, ear canal and external ear normal. There is no impacted cerumen.      Nose: Nose normal. No congestion or rhinorrhea.      Mouth/Throat:      Mouth: Mucous membranes are moist.      Pharynx: Oropharynx is clear. Posterior oropharyngeal erythema present.   Eyes:      Conjunctiva/sclera: Conjunctivae normal.      Pupils: Pupils are equal, round, and reactive to light.   Cardiovascular:      Rate and Rhythm: Normal rate and regular rhythm.      Pulses: Normal pulses.      Heart sounds: Normal heart sounds. No murmur heard.  Pulmonary:      Effort: Pulmonary effort is normal. No respiratory distress.      Breath sounds: Normal breath sounds.   Musculoskeletal:         General: Normal range of motion.      Cervical back: " Normal range of motion and neck supple.   Lymphadenopathy:      Head:      Right side of head: No submental, submandibular, tonsillar, preauricular, posterior auricular or occipital adenopathy.      Left side of head: No submental, submandibular, tonsillar, preauricular, posterior auricular or occipital adenopathy.      Cervical: No cervical adenopathy.   Skin:     General: Skin is warm and dry.      Findings: No rash.   Neurological:      Mental Status: She is alert and oriented to person, place, and time.   Psychiatric:         Behavior: Behavior normal.                Result Review :                                   Assessment and Plan      Diagnoses and all orders for this visit:    1. Cough (Primary)  -     Cancel: POCT SARS-CoV-2 Antigen CONNOR  -     POC Rapid Strep A  -     POCT SARS-CoV-2 Antigen CONNOR    2. Sore throat  Assessment & Plan:  Rapid strep and COVID negative.    Orders:  -     POC Rapid Strep A  -     POCT SARS-CoV-2 Antigen CONNOR    3. Pharyngitis, unspecified etiology  Assessment & Plan:  Treating patient for pharyngitis with a Z-Haris.  Advised her to increase fluid intake, salt water gargles, alternate Tylenol and ibuprofen, voice rest.  If new symptoms develop or patient's condition worsens I did recommend her to get medical treatment.      Other orders  -     azithromycin (Zithromax Z-Haris) 250 MG tablet; Take 2 tablets by mouth on day 1, then 1 tablet daily on days 2-5  Dispense: 6 tablet; Refill: 0          Follow Up       No follow-ups on file.      Patient was given instructions and counseling regarding her condition or for health maintenance advice. Please see specific information pulled into the AVS if appropriate.     Antionette Aguirre, APRN5/20/202210:43 EDT  This note has been electronically signed

## 2022-05-20 NOTE — ASSESSMENT & PLAN NOTE
Treating patient for pharyngitis with a Z-Haris.  Advised her to increase fluid intake, salt water gargles, alternate Tylenol and ibuprofen, voice rest.  If new symptoms develop or patient's condition worsens I did recommend her to get medical treatment.

## 2022-05-24 ENCOUNTER — PATIENT OUTREACH (OUTPATIENT)
Dept: CASE MANAGEMENT | Facility: OTHER | Age: 72
End: 2022-05-24
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
## 2022-06-30 ENCOUNTER — PATIENT OUTREACH (OUTPATIENT)
Dept: CASE MANAGEMENT | Facility: OTHER | Age: 72
End: 2022-06-30

## 2022-06-30 NOTE — OUTREACH NOTE
AMBULATORY CASE MANAGEMENT NOTE    Name and Relationship of Patient/Support Person: Shannon Dyson C - Self    Routine f/up HRCM outreach completed, see flow sheet below for details.    Ms Dyson agreeable to RN-ACM assist to schedule PCP f/up re toenail fungus status, states has ~10 days left of terbinafine.    Adult Patient Profile  Questions/Answers    Flowsheet Row Most Recent Value   Symptoms/Conditions Managed at Home skin, behavioral health, neurological   Barriers to Managing Health none   Neurological Symptoms/Conditions other (see comments)  [Long-covid related memory changes, states lately stable, not better/worse, confirms taking Aricept 10 mg QD, has f/u appt Dr Seipel 3/21/23. ]   Neurological Management Strategies adequate rest, coping strategies, activity, counseling, medication therapy, routine screening   Skin Symptoms/Conditions other (see comments)  [toenail fungus]   Skin Management Strategies coping strategies, medication therapy, routine screening   Skin Self-Management Outcome 4 (good)   Skin Comment States taking terbinafine QD, has 10 days RX left & thinks toenail fungus is improving but is unsure if totally resolved.  Agrees to RN-ACM assist to schedule PCP f/up to eval if more RX or laser therapy needed. Confirms practicing good preventative hygiene with footwear, socks etc.   Behavioral Health Symptoms/Conditions anxiety   Behavioral Management Strategies adequate rest, coping strategies, counseling, medication therapy, support system   Behavioral Health Self-Management Outcome 3 (uncertain)   Behavioral Health Comment Conts to have fear of driving/riding in a car, but allows daughter or spouse to drive her if needed: ie conts to attend [employer] & important appts. Will discuss this w/ Mamadou Turner at 7/19 appt   Identifying Health Goals managing stress, anxiety or depression        MICHAEL GAMBOA  Ambulatory Case Management  6/30/2022, 16:22 EDT

## 2022-07-01 ENCOUNTER — PATIENT OUTREACH (OUTPATIENT)
Dept: CASE MANAGEMENT | Facility: OTHER | Age: 72
End: 2022-07-01

## 2022-07-01 RX ORDER — LEVOTHYROXINE SODIUM 112 UG/1
TABLET ORAL
Qty: 90 TABLET | Refills: 0 | Status: SHIPPED | OUTPATIENT
Start: 2022-07-01 | End: 2022-12-04

## 2022-07-01 NOTE — OUTREACH NOTE
AMBULATORY CASE MANAGEMENT NOTE    Patient Outreach  Name and Relationship of Patient/Support Person: Shannon Dyson C - Self  LVM re 2:30 pm PCP appt on 7/6/22 - requested patient call or text RN-ACM to confirm.    Care Coordination  Name and Relationship of Patient/Support Person: VIKAS Toro scheduled 15 min f/up on 7/6/22 @ 2:30 pm    MICHAEL GAMBOA  Ambulatory Case Management  7/1/2022, 09:47 EDT

## 2022-07-05 ENCOUNTER — PATIENT OUTREACH (OUTPATIENT)
Dept: CASE MANAGEMENT | Facility: OTHER | Age: 72
End: 2022-07-05

## 2022-07-05 NOTE — OUTREACH NOTE
AMBULATORY CASE MANAGEMENT NOTE    Patient Outreach  Name and Relationship of Patient/Support Person: Shannon Dyson C - Self  Patient confirms 15 min PCP f/up appt tomorrow, 7/6/22 @ 2:30 pm. She verb appreciation for the assist, does not voice additional needs at this time.   Also confirms she has RN-ACM and nurse call center contact information to call as needed.    MICHAEL GAMBOA  Ambulatory Case Management  7/5/2022, 14:28 EDT

## 2022-07-06 ENCOUNTER — OFFICE VISIT (OUTPATIENT)
Dept: FAMILY MEDICINE CLINIC | Facility: CLINIC | Age: 72
End: 2022-07-06

## 2022-07-06 VITALS
HEIGHT: 64 IN | TEMPERATURE: 97.8 F | HEART RATE: 68 BPM | OXYGEN SATURATION: 99 % | WEIGHT: 164.2 LBS | SYSTOLIC BLOOD PRESSURE: 138 MMHG | DIASTOLIC BLOOD PRESSURE: 82 MMHG | BODY MASS INDEX: 28.03 KG/M2

## 2022-07-06 DIAGNOSIS — B35.1 NAIL FUNGUS: ICD-10-CM

## 2022-07-06 DIAGNOSIS — E03.9 ACQUIRED HYPOTHYROIDISM: Primary | ICD-10-CM

## 2022-07-06 PROCEDURE — 99213 OFFICE O/P EST LOW 20 MIN: CPT | Performed by: NURSE PRACTITIONER

## 2022-07-06 NOTE — PROGRESS NOTES
"    Shannon Dyson is a 71 y.o. female.     71-year-old white female with hypertension, hyperlipidemia, rheumatoid arthritis, fibrocystic breast disease, hypothyroidism, anxiety with depression and worsening memory loss who was here back in April with complaints of toenail fungus.  I placed her on Lamisil unfortunately and has done nothing for her fungus.  I am going to have her  Tolcylen nail solution to use twice a day for a couple months to see if this helps with her nails.  She has not to wear any nail polish during this period.  She does not have the finances to do laser surgery on her nails    Blood pressure 138/82 heart rate 68 she denies any chest pain, dyspnea, tachycardia or dizziness.    Will be checking thyroid levels today its been 6 months          Tolcylen nail solution twice daily x2 to 3 months  Blood work today  Follow-up 3 to 6 months         The following portions of the patient's history were reviewed and updated as appropriate: allergies, current medications, past family history, past medical history, past social history, past surgical history and problem list.    Vitals:    07/06/22 1444   BP: 138/82   BP Location: Right arm   Patient Position: Sitting   Cuff Size: Adult   Pulse: 68   Temp: 97.8 °F (36.6 °C)   TempSrc: Temporal   SpO2: 99%   Weight: 74.5 kg (164 lb 3.2 oz)   Height: 162.6 cm (64\")     Body mass index is 28.18 kg/m².    Past Medical History:   Diagnosis Date   • Allergic As a teenager   • Anxiety    • Arthritis ??   • Breast cyst    • Cataract About 10 years ago   • Fibrocystic breast    • GERD (gastroesophageal reflux disease)    • HL (hearing loss) ??   • Hyperlipidemia    • Hypertension    • Hypothyroidism      Past Surgical History:   Procedure Laterality Date   • APPENDECTOMY     • BREAST BIOPSY     • BREAST SURGERY     • HYSTERECTOMY       Family History   Problem Relation Age of Onset   • Heart disease Mother    • Arthritis Mother    • Hyperlipidemia Mother  "   • Heart disease Father    • Arthritis Father      Immunization History   Administered Date(s) Administered   • COVID-19 (MODERNA) 1st, 2nd, 3rd Dose Only 01/29/2021, 02/26/2021, 11/10/2021   • FluLaval/Fluarix/Fluzone >6 11/02/2019   • Fluzone High Dose =>65 Years ([facility] ONLY) 09/28/2019, 11/02/2019   • Fluzone High-Dose 65+yrs 10/06/2020, 10/14/2021   • H1N1 Inj Preservative Free 11/19/2009   • Influenza Quad Vaccine (Inpatient) 10/08/2015   • Pneumococcal Conjugate 13-Valent (PCV13) 10/02/2019, 10/06/2020   • Shingrix 10/02/2019, 10/06/2020   • Zostavax 10/28/2016       Office Visit on 05/20/2022   Component Date Value Ref Range Status   • Rapid Strep A Screen 05/20/2022 Negative  Negative, VALID, INVALID, Not Performed Final   • Internal Control 05/20/2022 Passed  Passed Final   • Lot Number 05/20/2022 [id number]   Final   • Expiration Date 05/20/2022 [id number]   Final   • SARS Antigen 05/20/2022 Not Detected  Not Detected, Presumptive Negative Final   • Influenza A Antigen CONNOR 05/20/2022 Not Detected  Not Detected Final   • Influenza B Antigen CONNOR 05/20/2022 Not Detected  Not Detected Final   • Internal Control 05/20/2022 Passed  Passed Final   • Lot Number 05/20/2022 [id number]   Final   • Expiration Date 05/20/2022 [id number]   Final         Review of Systems   Constitutional: Negative.    HENT: Negative.    Respiratory: Negative.    Cardiovascular: Negative.    Gastrointestinal: Negative.    Genitourinary: Negative.    Musculoskeletal: Negative.    Neurological: Negative.    Psychiatric/Behavioral: Negative.        Objective   Physical Exam  Constitutional:       Appearance: Normal appearance.   HENT:      Head: Normocephalic.   Cardiovascular:      Rate and Rhythm: Normal rate and regular rhythm.      Pulses: Normal pulses.      Heart sounds: Normal heart sounds.   Pulmonary:      Effort: Pulmonary effort is normal.      Breath sounds: Normal breath sounds.   Abdominal:      General: Bowel sounds are  normal.   Musculoskeletal:         General: Normal range of motion.   Skin:     General: Skin is warm and dry.   Neurological:      General: No focal deficit present.      Mental Status: She is alert and oriented to person, place, and time.   Psychiatric:         Mood and Affect: Mood normal.         Behavior: Behavior normal.         Procedures    Assessment & Plan   Diagnoses and all orders for this visit:    1. Acquired hypothyroidism (Primary)  -     TSH+Free T4  -     T3    2. Nail fungus          Current Outpatient Medications:   •  ARIPiprazole (ABILIFY) 5 MG tablet, TAKE 1 TABLET EVERY DAY, Disp: 90 tablet, Rfl: 0  •  atenolol (TENORMIN) 50 MG tablet, TAKE 1 TABLET EVERY DAY, Disp: 90 tablet, Rfl: 1  •  celecoxib (CeleBREX) 100 MG capsule, Take 100 mg by mouth 2 (Two) Times a Day As Needed., Disp: , Rfl:   •  Cholecalciferol (Vitamin D3) 50 MCG (2000 UT) tablet, Take 1 tablet by mouth Daily., Disp: 90 tablet, Rfl: 2  •  donepezil (Aricept) 10 MG tablet, Take 1 tablet by mouth Every Night., Disp: 90 tablet, Rfl: 3  •  doxepin (SINEquan) 100 MG capsule, Take 1 capsule by mouth Every Night., Disp: 90 capsule, Rfl: 1  •  escitalopram (Lexapro) 20 MG tablet, Take 1 tablet by mouth Daily., Disp: 90 tablet, Rfl: 0  •  gemfibrozil (LOPID) 600 MG tablet, TAKE 1 TABLET TWICE DAILY BEFORE MEALS, Disp: 180 tablet, Rfl: 1  •  levothyroxine (SYNTHROID, LEVOTHROID) 112 MCG tablet, TAKE 1 TABLET EVERY DAY, Disp: 90 tablet, Rfl: 0  •  losartan (COZAAR) 100 MG tablet, TAKE 1 TABLET EVERY DAY, Disp: 90 tablet, Rfl: 1  •  terbinafine (lamiSIL) 250 MG tablet, Take 1 tablet by mouth Daily., Disp: 90 tablet, Rfl: 0           CHRISTINA Zamorano 7/6/2022 16:17 EDT  This note has been electronically signed

## 2022-07-07 DIAGNOSIS — F40.00 AGORAPHOBIA: ICD-10-CM

## 2022-07-07 DIAGNOSIS — F41.1 GAD (GENERALIZED ANXIETY DISORDER): Chronic | ICD-10-CM

## 2022-07-07 DIAGNOSIS — F33.42 DEPRESSION, MAJOR, RECURRENT, IN COMPLETE REMISSION: Chronic | ICD-10-CM

## 2022-07-07 DIAGNOSIS — L29.9 ITCHING: Chronic | ICD-10-CM

## 2022-07-07 LAB
T3 SERPL-MCNC: 84 NG/DL (ref 71–180)
T4 FREE SERPL-MCNC: 1.17 NG/DL (ref 0.82–1.77)
TSH SERPL DL<=0.005 MIU/L-ACNC: 2.41 UIU/ML (ref 0.45–4.5)

## 2022-07-08 RX ORDER — ESCITALOPRAM OXALATE 20 MG/1
TABLET ORAL
Qty: 90 TABLET | Refills: 0 | OUTPATIENT
Start: 2022-07-08

## 2022-07-08 RX ORDER — DOXEPIN HYDROCHLORIDE 100 MG/1
100 CAPSULE ORAL NIGHTLY
Qty: 90 CAPSULE | Refills: 1 | OUTPATIENT
Start: 2022-07-08

## 2022-07-08 NOTE — TELEPHONE ENCOUNTER
90 day supply escitalopram sent previously, and pt should have over a month left. Doxepin was previously sent with 90 day supply and a refill, and the refill should be available.

## 2022-07-19 ENCOUNTER — OFFICE VISIT (OUTPATIENT)
Dept: PSYCHIATRY | Facility: CLINIC | Age: 72
End: 2022-07-19

## 2022-07-19 VITALS
BODY MASS INDEX: 28.46 KG/M2 | HEART RATE: 62 BPM | OXYGEN SATURATION: 98 % | WEIGHT: 165.8 LBS | SYSTOLIC BLOOD PRESSURE: 108 MMHG | DIASTOLIC BLOOD PRESSURE: 64 MMHG

## 2022-07-19 DIAGNOSIS — F33.41 RECURRENT MAJOR DEPRESSIVE DISORDER, IN PARTIAL REMISSION: Primary | Chronic | ICD-10-CM

## 2022-07-19 DIAGNOSIS — F41.1 GAD (GENERALIZED ANXIETY DISORDER): Chronic | ICD-10-CM

## 2022-07-19 PROCEDURE — 99214 OFFICE O/P EST MOD 30 MIN: CPT

## 2022-07-19 RX ORDER — ESCITALOPRAM OXALATE 20 MG/1
20 TABLET ORAL DAILY
Qty: 90 TABLET | Refills: 0 | Status: SHIPPED | OUTPATIENT
Start: 2022-07-19

## 2022-07-19 RX ORDER — BUPROPION HYDROCHLORIDE 150 MG/1
150 TABLET ORAL EVERY MORNING
Qty: 90 TABLET | Refills: 0 | Status: SHIPPED | OUTPATIENT
Start: 2022-07-19 | End: 2022-09-22

## 2022-07-19 RX ORDER — ARIPIPRAZOLE 5 MG/1
5 TABLET ORAL DAILY
Qty: 90 TABLET | Refills: 0 | Status: SHIPPED | OUTPATIENT
Start: 2022-07-19 | End: 2022-09-21 | Stop reason: DRUGHIGH

## 2022-07-19 NOTE — PROGRESS NOTES
Subjective   Shannon Dyson is a 71 y.o. female who presents today for follow-up medication management.    Chief Complaint: dep/anx    History of Present Illness:  -Previous post-COVID psych complaints of dep/anx and SI.   -C/o of explosive behavior at initial eval with anxiety bordering paranoia.  -Mild cognitive impairment via MMSE w/ memory complaints.  -Abilify had some efficacy at 5mg and continued.  -Viibryd and Fluoxetine failed due to side effects.  -Perpetual concerns she will not get back to her old self.  -9/28/21-2/10/22 in previous notes.    5/17/22: Pt's mood and anxiety have deteriorated since her last visit. She is c/o continued depression despite continuing her previous medications. She is willing to increase her lexapro to determine benefit at the higher dose, and denies SI/HI or AVH.    7/19/22: Patient reports some minimal improvement in depression and anxiety with the increase in Lexapro to 20 mg.  She is agreeable to add bupropion and attempt to improve depression and anxiety knowing bupropion could do the opposite and increase anxiety.  She would like to be on less medication than she currently is and is willing to start medication consolidation at the next visit if she improves with this medication.  Denies audiovisual hallucinations or SI or HI.    The following portions of the patient's history were reviewed and updated as appropriate: allergies, current medications, past family history, past medical history, past social history, past surgical history and problem list.    PAST PSYCHIATRIC HISTORY  Diagnosis treated:  MDD, Anxiety/Panic Disorder  Treatment Type:  Medication Management by PCP.  GeneSight: Normal folic acid conversion.  Prior Psychiatric Medications:  Celexa - ineffective  Viibryd - SI  Fluoxetine - conjunctivitis.  Hydroxyzine - effective for sleep and post-bath itching, but doxepin depression benefit was lost.  Lexapro -incomplete efficacy at 20 mg.  *Can't recall any  meds prior to Celexa.  Support Groups:  None  Sequelae Of Mental Disorder:  social isolation, emotional distress, family dysfunction.    Interval History  Improved but insufficiently    Side Effects  Denies    Allergy:   Allergies   Allergen Reactions   • Codeine Nausea And Vomiting   • Penicillin G Unknown (See Comments)   • Amlodipine Swelling      Discontinued Medications:  Medications Discontinued During This Encounter   Medication Reason   • ARIPiprazole (ABILIFY) 5 MG tablet Reorder   • escitalopram (Lexapro) 20 MG tablet Reorder     Physical Exam: in NAD.  Blood pressure 108/64, pulse 62, weight 75.2 kg (165 lb 12.8 oz), SpO2 98 %, not currently breastfeeding.   Wt Readings from Last 5 Encounters:   07/19/22 75.2 kg (165 lb 12.8 oz)   07/06/22 74.5 kg (164 lb 3.2 oz)   05/20/22 72.8 kg (160 lb 9.6 oz)   05/17/22 72.6 kg (160 lb)   04/06/22 71.5 kg (157 lb 9.6 oz)     BP Readings from Last 5 Encounters:   07/19/22 108/64   07/06/22 138/82   05/20/22 108/68   05/17/22 130/78   04/06/22 126/80     Pulse Readings from Last 5 Encounters:   07/19/22 62   07/06/22 68   05/20/22 63   05/17/22 61   04/06/22 66     SpO2 Readings from Last 5 Encounters:   07/19/22 98%   07/06/22 99%   05/20/22 96%   05/17/22 97%   04/06/22 98%     Mental Status Exam:   Orientation:  To person, Place, Time and Situation  Memory: Recent and remote memory Deficits.  Mood/Affect: Depressed and anxious, improved  Suicidal Ideations: Denied  Homicidal Ideations:  None  Hallucinations: None  Delusions:  None  Obsessions: None  Behavior and Psychomotor Activity: Normal  Speech:  Minimal  Thought Process: Goal directed  Associations: Intact  Thought Content: Normal  Language: name objects and repeat phrases  Concentration and computation: Fair  Attention Span: Fair  Fund of Knowledge: Fair  Reliability: good  Insight into mental health: Fair  Judgement: Fair  Impulse Control:  fair  Hygiene: good  Cooperation:  Cooperative  Eye Contact:   Fair  Physical/Medical Issues: Yes, HLD, HTN, age-related cognitive decline.    MSE reviewed and accepted without changes from the previous visit.    PHQ-9 Depression Screening  Little interest or pleasure in doing things? 2-->more than half the days   Feeling down, depressed, or hopeless? 1-->several days   Trouble falling or staying asleep, or sleeping too much? 1-->several days   Feeling tired or having little energy? 1-->several days   Poor appetite or overeating? 1-->several days   Feeling bad about yourself - or that you are a failure or have let yourself or your family down?     Trouble concentrating on things, such as reading the newspaper or watching television? 1-->several days   Moving or speaking so slowly that other people could have noticed? Or the opposite - being so fidgety or restless that you have been moving around a lot more than usual? 1-->several days   Thoughts that you would be better off dead, or of hurting yourself in some way? 0-->not at all   PHQ-9 Total Score 8   If you checked off any problems, how difficult have these problems made it for you to do your work, take care of things at home, or get along with other people?       Feeling nervous, anxious or on edge: Several days  Not being able to stop or control worrying: More than half the days  Worrying too much about different things: More than half the days  Trouble Relaxing: Nearly every day  Being so restless that it is hard to sit still: Several days  Feeling afraid as if something awful might happen: Nearly every day  Becoming easily annoyed or irritable: Several days  ALBA 7 Total Score: 13  If you checked any problems, how difficult have these problems made it for you to do your work, take care of things at home, or get along with other people: Somewhat difficult  PHQ-2: 8; mild depression. Previously:17,  0, 20, 19 at initial eval.  ALBA-7: 13; moderate anxiety. Previously: 12, 1, 19, 7, 20 at initial eval.  MDQ: Negative; bipolar  not indicated (8/21/21)  MMSE: 23; mild cognitive impairment (8/21/21)    Never smoker    I advised Shannon of the risks of tobacco use.     Lab Results: Reviewed.    Assessment & Plan   Diagnoses and all orders for this visit:    1. Recurrent major depressive disorder, in partial remission (HCC) (Primary)  -     buPROPion XL (Wellbutrin XL) 150 MG 24 hr tablet; Take 1 tablet by mouth Every Morning.  Dispense: 90 tablet; Refill: 0  -     escitalopram (Lexapro) 20 MG tablet; Take 1 tablet by mouth Daily.  Dispense: 90 tablet; Refill: 0  -     ARIPiprazole (ABILIFY) 5 MG tablet; Take 1 tablet by mouth Daily.  Dispense: 90 tablet; Refill: 0    2. ALBA (generalized anxiety disorder)  -     buPROPion XL (Wellbutrin XL) 150 MG 24 hr tablet; Take 1 tablet by mouth Every Morning.  Dispense: 90 tablet; Refill: 0  -     escitalopram (Lexapro) 20 MG tablet; Take 1 tablet by mouth Daily.  Dispense: 90 tablet; Refill: 0  -     ARIPiprazole (ABILIFY) 5 MG tablet; Take 1 tablet by mouth Daily.  Dispense: 90 tablet; Refill: 0    PHQ-2: 8; mild depression. Previously:17,  0, 20, 19 at initial eval.  ALBA-7: 13; moderate anxiety. Previously: 12, 1, 19, 7, 20 at initial eval.  MDQ: Negative; bipolar not indicated (8/21/21)  MMSE: 23; mild cognitive impairment (8/21/21)  -Add 150 mg wellbutrin XL for more depression and anxiety benefit.  -Continue 20mg Lexapro daily for anx/depression control.  Consider alternative in the future.  -Cont. 100mg Doxepin daily for depression and leg itching. Consider trying 50mg later.  -Cont. 5mg Abilify nightly for depression, and consider removal or replacement if bupropion effective.   Attempt removal of ability if benefit from wellbutrin next time.  -Counseling continues to be recommended for stability.  -Cont 5mg Donepezil for mild age-related cognitive decline (MMSE=23) due to reported benefit. Consider Namenda in the future.  -F/U in 2m, determine benefit of 150 mg Wellbutrin XL. Consider labs to  recheck sodium. Monitor wt/bp/hr, and see if counseling obtained.    Visit Diagnoses:    ICD-10-CM ICD-9-CM   1. Recurrent major depressive disorder, in partial remission (HCC)  F33.41 296.35   2. ALBA (generalized anxiety disorder)  F41.1 300.02      TREATMENT PLAN/GOALS: In the short-term, the patient is to continue supportive psychotherapy efforts and medications as indicated. Treatment and medication options were discussed during today's visit. Long-term the goal will be to control symptoms so they do not negatively interfere with other aspects of the patient's life. Prognosis is optimistic with implementation of the current treatment plan. Patient ackowledged and verbally consented to the treatment plan and was educated on the importance of compliance with treatment and follow-up appointments.    MEDICATION ISSUES:  INSPECT reviewed 7/19/22 as expected. No current controlled Rxs.  Discussed medication options and treatment plan of prescribed medication as well as the risks, benefits, and side effects including potential falls, possible impaired driving, and metabolic adversities among others. Patient counseled on a multimodal approach with healthy nutrition, healthy sleep, regular physical activity, social activity, counseling, and medication taking part in his/her psychiatric management. Patient is agreeable to the plan, and he/she agreed to call the office with any worsening of symptoms or onset of side effects. Patient is agreeable to call 911 or go to the nearest ER should he/she begin having SI/HI or self-harming behavior.     Assisted patient in processing above session content; acknowledged and normalized patient’s thoughts, feelings, and concerns. Reviewed positive coping skills and behavior management in session with positive framing of thoughts. Allowed patient to freely discuss issues without interruption or judgment. Provided safe, confidential environment to facilitate the development of positive  therapeutic relationship and encourage open and honest communication.    MEDS ORDERED DURING VISIT:  New Medications Ordered This Visit   Medications   • buPROPion XL (Wellbutrin XL) 150 MG 24 hr tablet     Sig: Take 1 tablet by mouth Every Morning.     Dispense:  90 tablet     Refill:  0   • escitalopram (Lexapro) 20 MG tablet     Sig: Take 1 tablet by mouth Daily.     Dispense:  90 tablet     Refill:  0   • ARIPiprazole (ABILIFY) 5 MG tablet     Sig: Take 1 tablet by mouth Daily.     Dispense:  90 tablet     Refill:  0     Return in about 2 months (around 9/19/2022) for Recheck.      This document has been electronically signed by Mamadou Goss PA-C  July 19, 2022 18:11 EDT    EMR Dragon transcription disclaimer:  Part of this note may be an electronic transcription/translation of spoken language to printed text using the Dragon Dictation System.

## 2022-08-11 ENCOUNTER — PATIENT OUTREACH (OUTPATIENT)
Dept: CASE MANAGEMENT | Facility: OTHER | Age: 72
End: 2022-08-11

## 2022-08-11 NOTE — OUTREACH NOTE
AMBULATORY CASE MANAGEMENT NOTE    RN-ACM monitoring Mrs Dyson's chart for red flags x 90 days. Day 30 chart review completed with no flags noted.   Per chart review, patient remains in contact with PCP & Behavioral Health with regular appointments and contines to progress towards care plan goals with sense of health self-management & adequate support.    AWV completed, care gaps addressed, ACP on file, Mychart active.  RN-ACM 60-day chart review scheduled.    MICHAEL GAMBOA  Ambulatory Case Management  8/11/2022, 17:30 EDT

## 2022-08-17 ENCOUNTER — TELEPHONE (OUTPATIENT)
Dept: FAMILY MEDICINE CLINIC | Facility: CLINIC | Age: 72
End: 2022-08-17

## 2022-08-17 RX ORDER — DICYCLOMINE HYDROCHLORIDE 10 MG/1
10 CAPSULE ORAL
Qty: 60 CAPSULE | Refills: 2 | Status: SHIPPED | OUTPATIENT
Start: 2022-08-17

## 2022-08-17 NOTE — TELEPHONE ENCOUNTER
Black stool if it is blood mucus coming from the upper GI system stomach etc. and she is on Celebrex which is very hard on stomach's.  Has she had any stomach pain or abdominal cramping?    I called her in [facility] to take 4 times a day until the diarrhea stops and we need to get a stool sample

## 2022-08-17 NOTE — TELEPHONE ENCOUNTER
Caller: Shannon Dysno    Relationship: Self    Best call back number: [phone]    What medication are you requesting: SOMETHING FOR SYMPTOMS    What are your current symptoms: BLACK DIARRHEA    How long have you been experiencing symptoms: 1 DAY    Have you had these symptoms before:    [] Yes  [x] No    Have you been treated for these symptoms before:   [] Yes  [x] No    If a prescription is needed, what is your preferred pharmacy and phone number: [facility] [address] [facility] [address] [facility] [phone] [facility] [phone]      Additional notes:PATIENT IS UNABLE TO COME IN DUE TO NOT BEING ABLE TO GET FAR FROM THE RESTROOM.    PATIENT IS CONCERNED THAT THE DIARRHEA IS BLACK.

## 2022-08-18 NOTE — TELEPHONE ENCOUNTER
Spoke with pt.  She says she is feeling better this morning.  She got the call from the pharmacy last night and picked up the medication.  She took 1 pill last night and she is better.  She is going to come by the office today to  Occult for stool.  She is not having abd pain or cramping.  SG

## 2022-08-19 ENCOUNTER — CLINICAL SUPPORT (OUTPATIENT)
Dept: FAMILY MEDICINE CLINIC | Facility: CLINIC | Age: 72
End: 2022-08-19

## 2022-08-19 DIAGNOSIS — K92.1 TARRY STOOL: Primary | ICD-10-CM

## 2022-08-19 LAB
DEVELOPER EXPIRATION DATE: NORMAL
DEVELOPER LOT NUMBER: NORMAL
EXPIRATION DATE: NORMAL
FECAL OCCULT BLOOD SCREEN, POC: NEGATIVE
Lab: NORMAL
NEGATIVE CONTROL: NEGATIVE
POSITIVE CONTROL: POSITIVE

## 2022-08-19 PROCEDURE — 82270 OCCULT BLOOD FECES: CPT | Performed by: NURSE PRACTITIONER

## 2022-08-24 ENCOUNTER — TELEPHONE (OUTPATIENT)
Dept: FAMILY MEDICINE CLINIC | Facility: CLINIC | Age: 72
End: 2022-08-24

## 2022-08-24 NOTE — TELEPHONE ENCOUNTER
Caller: Shannon Dyson    Relationship: Self    Best call back number: [phone]    What is the best time to reach you: ANYTIME    Who are you requesting to speak with (clinical staff, provider,  specific staff member):CLINICAL STAFF      Do you know the name of the person who called:SELF    What was the call regarding: PATIENT CALLED AND WANTS TO KNOW IF IT IS TIME FOR HER TO BE SCHEDULED FOR HER MAMMOGRAM. PLEASE ADVISE.     Do you require a callback: YES

## 2022-08-25 ENCOUNTER — OFFICE VISIT (OUTPATIENT)
Dept: FAMILY MEDICINE CLINIC | Facility: CLINIC | Age: 72
End: 2022-08-25

## 2022-08-25 VITALS
WEIGHT: 163.2 LBS | SYSTOLIC BLOOD PRESSURE: 125 MMHG | BODY MASS INDEX: 27.86 KG/M2 | OXYGEN SATURATION: 96 % | TEMPERATURE: 97.6 F | HEART RATE: 63 BPM | DIASTOLIC BLOOD PRESSURE: 79 MMHG | HEIGHT: 64 IN

## 2022-08-25 DIAGNOSIS — M79.10 MUSCLE PAIN: ICD-10-CM

## 2022-08-25 DIAGNOSIS — R79.89 ELEVATED SERUM CREATININE: Primary | ICD-10-CM

## 2022-08-25 DIAGNOSIS — Z00.00 PREVENTATIVE HEALTH CARE: ICD-10-CM

## 2022-08-25 PROBLEM — H18.519 CORNEAL ENDOTHELIAL DYSTROPHY: Status: ACTIVE | Noted: 2022-07-27

## 2022-08-25 PROBLEM — H04.123 DRY EYES: Status: ACTIVE | Noted: 2022-07-27

## 2022-08-25 PROBLEM — H26.493 BILATERAL POSTERIOR CAPSULAR OPACIFICATION: Status: ACTIVE | Noted: 2022-07-27

## 2022-08-25 PROCEDURE — 99213 OFFICE O/P EST LOW 20 MIN: CPT | Performed by: NURSE PRACTITIONER

## 2022-08-25 NOTE — PATIENT INSTRUCTIONS
CBC  CMP  Tylenol/Aleve/ibuprofen for pain  If no improvement  in 2 to 3 weeks call office  Report any further black stools or diarrhea

## 2022-08-25 NOTE — PROGRESS NOTES
"    Shannon Dyson is a 71 y.o. female.     71-year-old white female with hypertension, hyperlipidemia, rheumatoid arthritis, fibrocystic breast disease, hypothyroidism, anxiety and depression and worsening memory loss who comes in today with complaints of pain on the outside of left breast with movement.  There is no pain on palpation no redness warmth or nodules noted.  I suspect patient has pulled a little muscle.  I told her to take Aleve or ibuprofen if she wants to for pain and to let me know in 2 to 3 weeks if that has not resolved    Couple weeks ago patient had episode of black diarrhea and we did an occult blood  stool that was negative.  She states that is all gone away and only happen for 1 day and has had no issues since    Vital signs are stable              CBC  CMP  Tylenol/Aleve/ibuprofen for pain  If no improvement  in 2 to 3 weeks call office  Report any further black stools or diarrhea       The following portions of the patient's history were reviewed and updated as appropriate: allergies, current medications, past family history, past medical history, past social history, past surgical history and problem list.    Vitals:    08/25/22 1301   BP: 125/79   BP Location: Right arm   Patient Position: Sitting   Cuff Size: Adult   Pulse: 63   Temp: 97.6 °F (36.4 °C)   TempSrc: Temporal   SpO2: 96%   Weight: 74 kg (163 lb 3.2 oz)   Height: 162.6 cm (64\")     Body mass index is 28.01 kg/m².    Past Medical History:   Diagnosis Date   • Allergic As a teenager   • Anxiety    • Arthritis ??   • Breast cyst    • Cataract About 10 years ago   • Depression Dont know   • Fibrocystic breast    • GERD (gastroesophageal reflux disease)    • HL (hearing loss) ??   • Hyperlipidemia    • Hypertension    • Hypothyroidism    • Panic disorder      Past Surgical History:   Procedure Laterality Date   • APPENDECTOMY     • BREAST BIOPSY     • BREAST SURGERY     • COLONOSCOPY  3 years ago   • HYSTERECTOMY       Family " History   Problem Relation Age of Onset   • Heart disease Mother    • Arthritis Mother    • Hyperlipidemia Mother    • Heart disease Father    • Arthritis Father      Immunization History   Administered Date(s) Administered   • COVID-19 (MODERNA) 1st, 2nd, 3rd Dose Only 01/29/2021, 02/26/2021, 11/10/2021   • FluLaval/Fluzone >6mos 11/02/2019   • Fluzone High Dose =>65 Years ([employer]ax[employer] ONLY) 09/28/2019, 11/02/2019   • Fluzone High-Dose 65+yrs 10/06/2020, 10/14/2021   • H1N1 Inj Preservative Free 11/19/2009   • Influenza Quad Vaccine (Inpatient) 10/08/2015   • Pneumococcal Conjugate 13-Valent (PCV13) 10/02/2019, 10/06/2020   • Shingrix 10/02/2019, 10/06/2020   • Zostavax 10/28/2016       Clinical Support on 08/19/2022   Component Date Value Ref Range Status   • Fecal Occult Blood 08/19/2022 Negative  Negative Final   • Lot Number 08/19/2022 [id number]   Final   • Expiration Date 08/19/2022 [id number]   Final   • DEVELOPER LOT NUMBER 08/19/2022 [id number]   Final   • DEVELOPER EXPIRATION DATE 08/19/2022 [id number]   Final   • Positive Control 08/19/2022 Positive  Positive Final   • Negative Control 08/19/2022 Negative  Negative Final         Review of Systems   Constitutional: Negative.    HENT: Negative.    Respiratory: Negative.    Cardiovascular: Negative.    Gastrointestinal: Negative.    Genitourinary: Negative.    Musculoskeletal: Positive for myalgias.   Skin: Negative.    Neurological: Negative.    Psychiatric/Behavioral: Negative.        Objective   Physical Exam  Constitutional:       Appearance: Normal appearance.   HENT:      Head: Normocephalic.   Cardiovascular:      Rate and Rhythm: Normal rate and regular rhythm.      Pulses: Normal pulses.      Heart sounds: Normal heart sounds.   Pulmonary:      Breath sounds: Normal breath sounds.   Abdominal:      General: Bowel sounds are normal.   Musculoskeletal:      Comments: Pain on left side of breast with movement   Skin:     General: Skin is warm and dry.    Neurological:      General: No focal deficit present.      Mental Status: She is alert and oriented to person, place, and time.   Psychiatric:         Mood and Affect: Mood normal.         Procedures    Assessment & Plan   Diagnoses and all orders for this visit:    1. Elevated serum creatinine (Primary)  -     Comprehensive Metabolic Panel    2. Preventative health care  -     CBC & Differential    3. Muscle pain          Current Outpatient Medications:   •  ARIPiprazole (ABILIFY) 5 MG tablet, Take 1 tablet by mouth Daily., Disp: 90 tablet, Rfl: 0  •  atenolol (TENORMIN) 50 MG tablet, TAKE 1 TABLET EVERY DAY, Disp: 90 tablet, Rfl: 1  •  buPROPion XL (Wellbutrin XL) 150 MG 24 hr tablet, Take 1 tablet by mouth Every Morning., Disp: 90 tablet, Rfl: 0  •  celecoxib (CeleBREX) 100 MG capsule, Take 100 mg by mouth 2 (Two) Times a Day As Needed., Disp: , Rfl:   •  Cholecalciferol (Vitamin D3) 50 MCG (2000 UT) tablet, Take 1 tablet by mouth Daily., Disp: 90 tablet, Rfl: 2  •  dicyclomine (Bentyl) 10 MG capsule, Take 1 capsule by mouth 4 (Four) Times a Day Before Meals & at Bedtime., Disp: 60 capsule, Rfl: 2  •  donepezil (Aricept) 10 MG tablet, Take 1 tablet by mouth Every Night., Disp: 90 tablet, Rfl: 3  •  doxepin (SINEquan) 100 MG capsule, Take 1 capsule by mouth Every Night., Disp: 90 capsule, Rfl: 1  •  escitalopram (Lexapro) 20 MG tablet, Take 1 tablet by mouth Daily., Disp: 90 tablet, Rfl: 0  •  gemfibrozil (LOPID) 600 MG tablet, TAKE 1 TABLET TWICE DAILY BEFORE MEALS, Disp: 180 tablet, Rfl: 1  •  levothyroxine (SYNTHROID, LEVOTHROID) 112 MCG tablet, TAKE 1 TABLET EVERY DAY, Disp: 90 tablet, Rfl: 0  •  losartan (COZAAR) 100 MG tablet, TAKE 1 TABLET EVERY DAY, Disp: 90 tablet, Rfl: 1           Ana Paula Braun, APRN 8/25/2022 13:44 EDT  This note has been electronically signed

## 2022-08-25 NOTE — TELEPHONE ENCOUNTER
Notified pt that mammo needs repeated after 10/12.  Pt c/o chest tightness in left breast.  Made appt for pt to be checked.

## 2022-08-26 LAB
ALBUMIN SERPL-MCNC: 5 G/DL (ref 3.7–4.7)
ALBUMIN/GLOB SERPL: 1.9 {RATIO} (ref 1.2–2.2)
ALP SERPL-CCNC: 149 IU/L (ref 44–121)
ALT SERPL-CCNC: 9 IU/L (ref 0–32)
AST SERPL-CCNC: 18 IU/L (ref 0–40)
BASOPHILS # BLD AUTO: 0.1 X10E3/UL (ref 0–0.2)
BASOPHILS NFR BLD AUTO: 2 %
BILIRUB SERPL-MCNC: 0.3 MG/DL (ref 0–1.2)
BUN SERPL-MCNC: 25 MG/DL (ref 8–27)
BUN/CREAT SERPL: 28 (ref 12–28)
CALCIUM SERPL-MCNC: 10.6 MG/DL (ref 8.7–10.3)
CHLORIDE SERPL-SCNC: 104 MMOL/L (ref 96–106)
CO2 SERPL-SCNC: 21 MMOL/L (ref 20–29)
CREAT SERPL-MCNC: 0.9 MG/DL (ref 0.57–1)
EGFRCR-CYS SERPLBLD CKD-EPI 2021: 68 ML/MIN/1.73
EOSINOPHIL # BLD AUTO: 0.3 X10E3/UL (ref 0–0.4)
EOSINOPHIL NFR BLD AUTO: 5 %
ERYTHROCYTE [DISTWIDTH] IN BLOOD BY AUTOMATED COUNT: 12.9 % (ref 11.7–15.4)
GLOBULIN SER CALC-MCNC: 2.6 G/DL (ref 1.5–4.5)
GLUCOSE SERPL-MCNC: 90 MG/DL (ref 65–99)
HCT VFR BLD AUTO: 37.3 % (ref 34–46.6)
HGB BLD-MCNC: 12.8 G/DL (ref 11.1–15.9)
IMM GRANULOCYTES # BLD AUTO: 0 X10E3/UL (ref 0–0.1)
IMM GRANULOCYTES NFR BLD AUTO: 0 %
LYMPHOCYTES # BLD AUTO: 1.3 X10E3/UL (ref 0.7–3.1)
LYMPHOCYTES NFR BLD AUTO: 27 %
MCH RBC QN AUTO: 31.4 PG (ref 26.6–33)
MCHC RBC AUTO-ENTMCNC: 34.3 G/DL (ref 31.5–35.7)
MCV RBC AUTO: 91 FL (ref 79–97)
MONOCYTES # BLD AUTO: 0.4 X10E3/UL (ref 0.1–0.9)
MONOCYTES NFR BLD AUTO: 8 %
NEUTROPHILS # BLD AUTO: 2.8 X10E3/UL (ref 1.4–7)
NEUTROPHILS NFR BLD AUTO: 58 %
PLATELET # BLD AUTO: 221 X10E3/UL (ref 150–450)
POTASSIUM SERPL-SCNC: 4.6 MMOL/L (ref 3.5–5.2)
PROT SERPL-MCNC: 7.6 G/DL (ref 6–8.5)
RBC # BLD AUTO: 4.08 X10E6/UL (ref 3.77–5.28)
SODIUM SERPL-SCNC: 141 MMOL/L (ref 134–144)
WBC # BLD AUTO: 4.8 X10E3/UL (ref 3.4–10.8)

## 2022-09-08 ENCOUNTER — PATIENT OUTREACH (OUTPATIENT)
Dept: CASE MANAGEMENT | Facility: OTHER | Age: 72
End: 2022-09-08

## 2022-09-08 NOTE — OUTREACH NOTE
AMBULATORY CASE MANAGEMENT NOTE    RN-ACM monitoring Mrs Dyson's chart for red flags x 90 days. Day 60 chart review completed with no flags noted.   Per chart review, patient remains in contact with PCP & Behavioral Health with regular appointments and contines to progress towards care plan goals with sense of health self-management & adequate support.    AWV completed, care gaps addressed, ACP on file, Mychart active.  RN-ACM 90-day chart review scheduled.    MICHAEL GAMBOA  Ambulatory Case Management  9/8/2022, 17:44 EDT

## 2022-09-14 DIAGNOSIS — F33.42 DEPRESSION, MAJOR, RECURRENT, IN COMPLETE REMISSION: Chronic | ICD-10-CM

## 2022-09-14 DIAGNOSIS — F41.1 GAD (GENERALIZED ANXIETY DISORDER): Chronic | ICD-10-CM

## 2022-09-14 DIAGNOSIS — F40.00 AGORAPHOBIA: ICD-10-CM

## 2022-09-14 DIAGNOSIS — L29.9 ITCHING: Chronic | ICD-10-CM

## 2022-09-15 RX ORDER — DOXEPIN HYDROCHLORIDE 100 MG/1
100 CAPSULE ORAL NIGHTLY
Qty: 90 CAPSULE | Refills: 1 | Status: SHIPPED | OUTPATIENT
Start: 2022-09-15

## 2022-09-21 ENCOUNTER — OFFICE VISIT (OUTPATIENT)
Dept: PSYCHIATRY | Facility: CLINIC | Age: 72
End: 2022-09-21

## 2022-09-21 VITALS
DIASTOLIC BLOOD PRESSURE: 76 MMHG | WEIGHT: 167.2 LBS | HEART RATE: 65 BPM | BODY MASS INDEX: 28.7 KG/M2 | OXYGEN SATURATION: 98 % | SYSTOLIC BLOOD PRESSURE: 120 MMHG

## 2022-09-21 DIAGNOSIS — F41.1 GAD (GENERALIZED ANXIETY DISORDER): Chronic | ICD-10-CM

## 2022-09-21 DIAGNOSIS — F33.1 MODERATE EPISODE OF RECURRENT MAJOR DEPRESSIVE DISORDER: Chronic | ICD-10-CM

## 2022-09-21 DIAGNOSIS — F40.01 PANIC DISORDER WITH AGORAPHOBIA: Primary | Chronic | ICD-10-CM

## 2022-09-21 DIAGNOSIS — F09 MILD COGNITIVE DISORDER: Chronic | ICD-10-CM

## 2022-09-21 PROCEDURE — 99214 OFFICE O/P EST MOD 30 MIN: CPT

## 2022-09-21 RX ORDER — CLONAZEPAM 0.5 MG/1
0.5 TABLET ORAL 2 TIMES DAILY
Qty: 60 TABLET | Refills: 2 | Status: SHIPPED | OUTPATIENT
Start: 2022-09-21

## 2022-09-21 RX ORDER — ARIPIPRAZOLE 10 MG/1
10 TABLET ORAL DAILY
Qty: 30 TABLET | Refills: 2 | Status: SHIPPED | OUTPATIENT
Start: 2022-09-21

## 2022-09-22 DIAGNOSIS — F41.1 GAD (GENERALIZED ANXIETY DISORDER): Chronic | ICD-10-CM

## 2022-09-22 DIAGNOSIS — F33.41 RECURRENT MAJOR DEPRESSIVE DISORDER, IN PARTIAL REMISSION: Chronic | ICD-10-CM

## 2022-09-22 RX ORDER — BUPROPION HYDROCHLORIDE 150 MG/1
TABLET ORAL
Qty: 90 TABLET | Refills: 0 | Status: SHIPPED | OUTPATIENT
Start: 2022-09-22 | End: 2023-02-13

## 2022-09-26 ENCOUNTER — TELEPHONE (OUTPATIENT)
Dept: FAMILY MEDICINE CLINIC | Facility: CLINIC | Age: 72
End: 2022-09-26

## 2022-10-06 ENCOUNTER — TELEPHONE (OUTPATIENT)
Dept: PSYCHIATRY | Facility: CLINIC | Age: 72
End: 2022-10-06

## 2022-10-06 ENCOUNTER — TELEPHONE (OUTPATIENT)
Dept: FAMILY MEDICINE CLINIC | Facility: CLINIC | Age: 72
End: 2022-10-06

## 2022-10-06 ENCOUNTER — PATIENT OUTREACH (OUTPATIENT)
Dept: CASE MANAGEMENT | Facility: OTHER | Age: 72
End: 2022-10-06

## 2022-10-06 DIAGNOSIS — Z12.31 OTHER SCREENING MAMMOGRAM: Primary | ICD-10-CM

## 2022-10-06 NOTE — OUTREACH NOTE
AMBULATORY CASE MANAGEMENT NOTE    RN-ACM monitoring Mrs Dyson's chart for red flags x 90 days - Day 90 chart review completed with no flags noted.   Per chart review, patient remains in contact with PCP& Behavioral Health re care/questions and appears to continue to progress towards goals with sense of health self-management & adequate support.    AWV completed, care gaps previously addressed, ACP on file, Mychart active.  HRCM program completed, no additional outreach scheduled.  RN-ACM to outreach in future as needed.     MICHAEL GAMBOA  Ambulatory Case Management  10/6/2022, 13:25 EDT

## 2022-10-06 NOTE — TELEPHONE ENCOUNTER
Caller: Shannon Dyson    Relationship: Self    Best call back number   [phone]    What orders are you requesting (i.e. lab or imaging): MAMMOGRAM     In what timeframe would the patient need to come in: AFTER 10-21-22     Where will you receive your lab/imaging services:     Additional notes:

## 2022-10-06 NOTE — TELEPHONE ENCOUNTER
The last thing we did was attempt to add Bupropion XL/Wellbutrin XL, she can stop taking that medication, but I would not recommend her stop taking anything else or she is likely to get much worse. She should restart everything else she had before adding Bupropion.

## 2022-10-06 NOTE — TELEPHONE ENCOUNTER
Pt says one of her meds is making her act crazy lately and she can hardly walk or talk and couldn't remember anything the family said she did or said, so she quit taking some meds.  Pt unsure which meds she stopped, but she said her sxs went away.   She cannot remember, but thinks she had either a new med added or an increase in a med.   is not home (, not sure when he will be home) and daughter just had surgery and is asleep.  She wants to change her appt to speak with you in person. She said the 3 of them are preparing papers to help clarify things with you, but she is really unsure what to say at this point.

## 2022-10-16 DIAGNOSIS — F33.9 MAJOR DEPRESSION, RECURRENT, CHRONIC: Chronic | ICD-10-CM

## 2022-10-16 RX ORDER — ARIPIPRAZOLE 5 MG/1
TABLET ORAL
Qty: 30 TABLET | Refills: 0 | OUTPATIENT
Start: 2022-10-16

## 2022-10-16 NOTE — TELEPHONE ENCOUNTER
Dosage was increased by Mamadou Goss to 10 mg during 9/21/22 visit plus 2 mos of refills, which should cover her until late Dec.

## 2022-10-18 DIAGNOSIS — F33.9 MAJOR DEPRESSION, RECURRENT, CHRONIC: Chronic | ICD-10-CM

## 2022-10-18 DIAGNOSIS — F41.8 ANXIETY ASSOCIATED WITH DEPRESSION: ICD-10-CM

## 2022-10-18 RX ORDER — ARIPIPRAZOLE 5 MG/1
TABLET ORAL
Qty: 90 TABLET | Refills: 0 | OUTPATIENT
Start: 2022-10-18

## 2022-10-25 ENCOUNTER — HOSPITAL ENCOUNTER (OUTPATIENT)
Dept: MAMMOGRAPHY | Facility: HOSPITAL | Age: 72
Discharge: HOME | End: 2022-10-25
Admitting: NURSE PRACTITIONER

## 2022-10-25 DIAGNOSIS — Z12.31 OTHER SCREENING MAMMOGRAM: ICD-10-CM

## 2022-10-25 PROCEDURE — 77063 BREAST TOMOSYNTHESIS BI: CPT

## 2022-10-25 PROCEDURE — 77067 SCR MAMMO BI INCL CAD: CPT

## 2022-12-04 RX ORDER — LOSARTAN POTASSIUM 100 MG/1
TABLET ORAL
Qty: 90 TABLET | Refills: 1 | Status: SHIPPED | OUTPATIENT
Start: 2022-12-04

## 2022-12-04 RX ORDER — ATENOLOL 50 MG/1
TABLET ORAL
Qty: 90 TABLET | Refills: 1 | Status: SHIPPED | OUTPATIENT
Start: 2022-12-04

## 2022-12-04 RX ORDER — LEVOTHYROXINE SODIUM 112 UG/1
TABLET ORAL
Qty: 90 TABLET | Refills: 0 | Status: SHIPPED | OUTPATIENT
Start: 2022-12-04

## 2022-12-29 NOTE — PROGRESS NOTES
"    Shannon Dyson is a 71 y.o. female.     71-year-old white female with history hypertension, hyperlipidemia, rheumatoid arthritis, fibrocystic breast disease, hypothyroidism, anxiety with depression and worsening memory loss who comes in today with 1 day history of blood in urine urgency and frequency.  Urinalysis showed +3 blood and 1+ leukocytes.  I am placing her on Cipro Pyridium and will do a follow-up urine in 2 weeks.  I encouraged patient to drink a lot of fluids    Vital signs stable.  Patient up-to-date on 3 COVID vaccines mammogram DEXA scan colonoscopy is due this year in December.  She needs to schedule an eye exam          Bactrim DS twice daily x7 days  Pyridium 100 mg 3 times daily x2 days  Increase fluid intake  Follow-up urine in 2 weeks  Schedule eye exam       The following portions of the patient's history were reviewed and updated as appropriate: allergies, current medications, past family history, past medical history, past social history, past surgical history and problem list.    Vitals:    03/15/22 1324   BP: 137/89   BP Location: Right arm   Patient Position: Sitting   Cuff Size: Adult   Pulse: 67   Temp: 97.6 °F (36.4 °C)   TempSrc: Temporal   SpO2: 98%   Weight: 70.9 kg (156 lb 6.4 oz)   Height: 162.6 cm (64\")     Body mass index is 26.85 kg/m².    Past Medical History:   Diagnosis Date   • Anxiety    • Breast cyst    • Fibrocystic breast    • GERD (gastroesophageal reflux disease)    • Hyperlipidemia    • Hypertension    • Hypothyroidism      Past Surgical History:   Procedure Laterality Date   • APPENDECTOMY     • BREAST BIOPSY     • BREAST SURGERY     • HYSTERECTOMY       Family History   Problem Relation Age of Onset   • Heart disease Mother    • Arthritis Mother    • Heart disease Father    • Arthritis Father      Immunization History   Administered Date(s) Administered   • COVID-19 (MODERNA) 1st, 2nd, 3rd Dose Only 01/29/2021, 02/26/2021, 11/10/2021   • " Chief complaint:  Back pain after a fall    The patient is an 82-year-old man who was well until 3 4 days ago when he developed a low-grade fever.  He became somewhat weaker than normal and fell on his way to the restroom about 2 days ago.  Following that he had increasingly severe low back pain.  He was brought to the emergency room where he was assessed and found to have an L1 compression fracture as well as a COVID respiratory tract infection.  He was not coughing much at home but began to cough more overnight.  His cough seemed to be exacerbated by pain meds.  He was receiving IV morphine in his breathing seemed to worsen and he began coughing more and became very somnolent and more confused than baseline.  He was given a dose of Narcan in his mental status improved.  He only had received 1 mg of morphine IV prior to the change in mental status.  Since that treatment he has however improved and seems to be close to baseline he is attended by his daughter.    Home meds are Eliquis and supplements but no other prescription meds.      Allergies none known    Past medical history notable for chronic atrial fib.  Followed by Dr. Sharif he also has coronary disease and has had stents placed.  He has a history of perhaps pulmonary hypertension.  The daughter is unaware of any lung disease.  He has a history of obstructive sleep apnea.  Hyperlipidemia but apparently that is not being treated.  He has a history of malignant melanoma of the skin did receive 1 dose of Keytruda but had side effects and it was stopped.  He has a history of dementia and scored 21/30 on MMSE according to Dr. Yosvany Taylor's notes.    Past surgical history notable for cataract surgery, shoulder surgery, and hip replacement.    He is a lifelong nonsmoker.  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  He played for the [employer].  He lives  FluLaval/Fluarix/Fluzone >6 11/02/2019   • Fluzone High Dose =>65 Years ([facility]axcare ONLY) 09/28/2019, 11/02/2019   • Fluzone High-Dose 65+yrs 10/06/2020, 10/14/2021   • H1N1 Inj Preservative Free 11/19/2009   • Influenza Quad Vaccine (Inpatient) 10/08/2015   • Pneumococcal Conjugate 13-Valent (PCV13) 10/02/2019, 10/06/2020   • Shingrix 10/02/2019, 10/06/2020   • Zostavax 10/28/2016       Lab on 02/10/2022   Component Date Value Ref Range Status   • Glucose 02/10/2022 97  65 - 99 mg/dL Final   • BUN 02/10/2022 17  8 - 23 mg/dL Final   • Creatinine 02/10/2022 0.81  0.57 - 1.00 mg/dL Final   • Sodium 02/10/2022 141  136 - 145 mmol/L Final   • Potassium 02/10/2022 4.5  3.5 - 5.2 mmol/L Final   • Chloride 02/10/2022 105  98 - 107 mmol/L Final   • CO2 02/10/2022 29.2 (A) 22.0 - 29.0 mmol/L Final   • Calcium 02/10/2022 10.8 (A) 8.6 - 10.5 mg/dL Final   • eGFR Non  Amer 02/10/2022 69  >60 mL/min/1.73 Final   • BUN/Creatinine Ratio 02/10/2022 21.0  7.0 - 25.0 Final   • Anion Gap 02/10/2022 6.8  5.0 - 15.0 mmol/L Final   • Total Cholesterol 02/10/2022 230 (A) 0 - 200 mg/dL Final   • Triglycerides 02/10/2022 123  0 - 150 mg/dL Final   • HDL Cholesterol 02/10/2022 54  40 - 60 mg/dL Final   • LDL Cholesterol  02/10/2022 154 (A) 0 - 100 mg/dL Final   • VLDL Cholesterol 02/10/2022 22  5 - 40 mg/dL Final   • LDL/HDL Ratio 02/10/2022 2.80   Final         Review of Systems   Constitutional: Negative.    HENT: Negative.    Respiratory: Negative.    Cardiovascular: Negative.    Gastrointestinal: Negative.    Genitourinary: Positive for frequency, hematuria and urgency.   Musculoskeletal: Negative.    Skin: Negative.    Neurological: Negative.    Psychiatric/Behavioral: Negative.        Objective   Physical Exam  Constitutional:       Appearance: Normal appearance.   Cardiovascular:      Rate and Rhythm: Normal rate and regular rhythm.      Pulses: Normal pulses.      Heart sounds: Normal heart sounds.   Pulmonary:      Effort:  at home with his wife.  He no longer drives.  He is relatively active however     Family history noncontributory     Review of systems is negative for chronic fevers chills or sweats.  His weight has dropped by a small amount according to the daughter.  He has had some headaches recently.  No dysphagia.  No diabetes or thyroid disease.  He denies chest pain or shortness of breath.  No edema.  The cough is mostly nonproductive and new.  No nausea or vomiting.  Does have issues with constipation.  No urgency frequency dysuria.      Physical exam:    Blood pressure 146/92 heart rate 99 respiratory rate 18 O2 sat 91% he was wearing a mask when he went to the room but he took it often was breathing comfortably without it.    General appearance is notable for him being alert and pleasant but not really able to give much of a history.  HEENT exam grossly unremarkable.  Neck is supple without thyromegaly or adenopathy.  Carotid pulses 2+ bilaterally.  Heart has an irregular rhythm with a normal rate.  Lungs clear anteriorly abdomen nontender and nondistended.  Extremities without clubbing cyanosis or edema.  Foot pulses intact.    Laboratory studies last night included low bicarb on the chemistry profile but otherwise unremarkable.  ALT was mildly elevated.  CBC unremarkable.  Troponin slightly high 0.207.  Drug screen negative.  Urinalysis unremarkable.  COVID swab positive.      Chest x-ray was clear.  CT lumbar spine did reveal the fracture at L1.  CT neck no acute findings and CT head showed no acute findings.  EKG showed atrial fib low-voltage QRS.    Impression 1.  Status post fall with fracture L1 as a result.  The fall was brought on by severe weakness related to 2.     2. COVID respiratory tract infection.      3. Excess sedation following IV morphine for pain.  Even a very small dose of 1 mg cause him to be more confused and more sedated.  There were also concerns of possible aspiration during the episode of  Pulmonary effort is normal.      Breath sounds: Normal breath sounds.   Abdominal:      General: Bowel sounds are normal.   Musculoskeletal:         General: Normal range of motion.   Skin:     General: Skin is warm and dry.   Neurological:      General: No focal deficit present.      Mental Status: She is alert.      Comments: Memory problems   Psychiatric:         Mood and Affect: Mood normal.         Behavior: Behavior normal.         Procedures    Assessment/Plan   Diagnoses and all orders for this visit:    1. Acquired hypothyroidism (Primary)  -     T3  -     TSH+Free T4    2. Hematuria, unspecified type  -     POCT urinalysis dipstick, automated  -     Urine Culture - Urine, Urine, Clean Catch    3. Acute urinary tract infection  -     POCT urinalysis dipstick, automated  -     Urine Culture - Urine, Urine, Clean Catch    Other orders  -     sulfamethoxazole-trimethoprim (BACTRIM DS,SEPTRA DS) 800-160 MG per tablet; Take 1 tablet by mouth 2 (Two) Times a Day for 7 days.  Dispense: 14 tablet; Refill: 0  -     phenazopyridine (Pyridium) 100 MG tablet; Take 1 tablet by mouth 3 (Three) Times a Day As Needed for Bladder Spasms.  Dispense: 6 tablet; Refill: 0          Current Outpatient Medications:   •  ARIPiprazole (ABILIFY) 5 MG tablet, Take 1 tablet by mouth Daily., Disp: 90 tablet, Rfl: 0  •  atenolol (TENORMIN) 50 MG tablet, TAKE 1 TABLET EVERY DAY, Disp: 90 tablet, Rfl: 1  •  celecoxib (CeleBREX) 100 MG capsule, Take 100 mg by mouth 2 (Two) Times a Day As Needed., Disp: , Rfl:   •  donepezil (Aricept) 5 MG tablet, Take 1 tablet by mouth Every Night., Disp: 90 tablet, Rfl: 1  •  doxepin (SINEquan) 50 MG capsule, Take 1 capsule by mouth Every Night. Every evening for shower itching and depression., Disp: 90 capsule, Rfl: 1  •  escitalopram (Lexapro) 10 MG tablet, Take 1 tablet by mouth Daily., Disp: 90 tablet, Rfl: 1  •  gemfibrozil (LOPID) 600 MG tablet, TAKE 1 TABLET TWICE DAILY BEFORE MEALS, Disp: 180  excess sedation     4. Chronic atrial fibrillation     5. Dementia.  With exacerbation    6. Diffuse degenerative joint disease    7. Hyperlipidemia     8. History of malignant melanoma treated with 1 dose Keytruda and then stopped     9. History of coronary disease with a mild bump in cardiac enzymes.  Likely demand ischemia    The patient has been admitted for further treatment.  Neurosurgery has been consulted regarding the fracture.  Will order physical therapy.  Will also get speech therapy seemed to make sure it is safe to swallow.  In the meantime I will put him on anticoagulation with Lovenox instead of Eliquis.  This can be stopped quickly if a procedure such as kyphoplasty is done.  Done.  Will also discontinue morphine in use Toradol carefully.   tablet, Rfl: 1  •  levothyroxine (SYNTHROID, LEVOTHROID) 112 MCG tablet, TAKE 1 TABLET BY MOUTH DAILY, Disp: 90 tablet, Rfl: 0  •  losartan (COZAAR) 100 MG tablet, Take 1 tablet by mouth Daily., Disp: 90 tablet, Rfl: 1  •  phenazopyridine (Pyridium) 100 MG tablet, Take 1 tablet by mouth 3 (Three) Times a Day As Needed for Bladder Spasms., Disp: 6 tablet, Rfl: 0  •  sulfamethoxazole-trimethoprim (BACTRIM DS,SEPTRA DS) 800-160 MG per tablet, Take 1 tablet by mouth 2 (Two) Times a Day for 7 days., Disp: 14 tablet, Rfl: 0           Ana Paula Braun, CHRISTINA 3/15/2022 13:40 EDT  This note has been electronically signed

## 2023-02-12 RX ORDER — GEMFIBROZIL 600 MG/1
TABLET, FILM COATED ORAL
Qty: 180 TABLET | Refills: 1 | Status: SHIPPED | OUTPATIENT
Start: 2023-02-12

## 2023-02-13 DIAGNOSIS — F09 MILD COGNITIVE DISORDER: Chronic | ICD-10-CM

## 2023-02-13 DIAGNOSIS — F33.41 RECURRENT MAJOR DEPRESSIVE DISORDER, IN PARTIAL REMISSION: Chronic | ICD-10-CM

## 2023-02-13 DIAGNOSIS — F41.1 GAD (GENERALIZED ANXIETY DISORDER): Chronic | ICD-10-CM

## 2023-02-13 RX ORDER — BUPROPION HYDROCHLORIDE 150 MG/1
TABLET ORAL
Qty: 90 TABLET | Refills: 0 | Status: SHIPPED | OUTPATIENT
Start: 2023-02-13

## 2023-02-13 RX ORDER — DONEPEZIL HYDROCHLORIDE 10 MG/1
10 TABLET, FILM COATED ORAL NIGHTLY
Qty: 90 TABLET | Refills: 3 | Status: SHIPPED | OUTPATIENT
Start: 2023-02-13

## 2023-05-25 ENCOUNTER — APPOINTMENT (OUTPATIENT)
Dept: CT IMAGING | Facility: HOSPITAL | Age: 73
End: 2023-05-25
Payer: MEDICARE

## 2023-05-25 ENCOUNTER — APPOINTMENT (OUTPATIENT)
Dept: ULTRASOUND IMAGING | Facility: HOSPITAL | Age: 73
End: 2023-05-25
Payer: MEDICARE

## 2023-05-25 ENCOUNTER — HOSPITAL ENCOUNTER (OUTPATIENT)
Facility: HOSPITAL | Age: 73
Setting detail: OBSERVATION
Discharge: HOME | End: 2023-05-27
Attending: EMERGENCY MEDICINE | Admitting: STUDENT IN AN ORGANIZED HEALTH CARE EDUCATION/TRAINING PROGRAM
Payer: MEDICARE

## 2023-05-25 DIAGNOSIS — M54.50 ACUTE RIGHT-SIDED LOW BACK PAIN WITHOUT SCIATICA: ICD-10-CM

## 2023-05-25 DIAGNOSIS — R52 INTRACTABLE PAIN: ICD-10-CM

## 2023-05-25 DIAGNOSIS — R10.9 RIGHT FLANK PAIN: Primary | ICD-10-CM

## 2023-05-25 DIAGNOSIS — N83.8 OVARIAN MASS, RIGHT: ICD-10-CM

## 2023-05-25 DIAGNOSIS — Z79.899 OTHER LONG TERM (CURRENT) DRUG THERAPY: ICD-10-CM

## 2023-05-25 DIAGNOSIS — F09 MILD COGNITIVE DISORDER: Chronic | ICD-10-CM

## 2023-05-25 DIAGNOSIS — H26.493 BILATERAL POSTERIOR CAPSULAR OPACIFICATION: ICD-10-CM

## 2023-05-25 LAB
ALBUMIN SERPL-MCNC: 4.2 G/DL (ref 3.5–5.2)
ALBUMIN/GLOB SERPL: 1.5 G/DL
ALP SERPL-CCNC: 144 U/L (ref 39–117)
ALT SERPL W P-5'-P-CCNC: 9 U/L (ref 1–33)
ANION GAP SERPL CALCULATED.3IONS-SCNC: 12 MMOL/L (ref 5–15)
AST SERPL-CCNC: 17 U/L (ref 1–32)
BACTERIA UR QL AUTO: ABNORMAL /HPF
BASOPHILS # BLD AUTO: 0 10*3/MM3 (ref 0–0.2)
BASOPHILS NFR BLD AUTO: 0.4 % (ref 0–1.5)
BILIRUB SERPL-MCNC: 0.2 MG/DL (ref 0–1.2)
BILIRUB UR QL STRIP: NEGATIVE
BUN SERPL-MCNC: 21 MG/DL (ref 8–23)
BUN/CREAT SERPL: 32.3 (ref 7–25)
CALCIUM SPEC-SCNC: 9.9 MG/DL (ref 8.6–10.5)
CHLORIDE SERPL-SCNC: 105 MMOL/L (ref 98–107)
CLARITY UR: CLEAR
CO2 SERPL-SCNC: 26 MMOL/L (ref 22–29)
COLOR UR: YELLOW
CREAT SERPL-MCNC: 0.65 MG/DL (ref 0.57–1)
DEPRECATED RDW RBC AUTO: 45.5 FL (ref 37–54)
EGFRCR SERPLBLD CKD-EPI 2021: 92.5 ML/MIN/1.73
EOSINOPHIL # BLD AUTO: 0.3 10*3/MM3 (ref 0–0.4)
EOSINOPHIL NFR BLD AUTO: 3.7 % (ref 0.3–6.2)
ERYTHROCYTE [DISTWIDTH] IN BLOOD BY AUTOMATED COUNT: 14.1 % (ref 12.3–15.4)
GLOBULIN UR ELPH-MCNC: 2.8 GM/DL
GLUCOSE SERPL-MCNC: 111 MG/DL (ref 65–99)
GLUCOSE UR STRIP-MCNC: NEGATIVE MG/DL
HCT VFR BLD AUTO: 37 % (ref 34–46.6)
HGB BLD-MCNC: 12.4 G/DL (ref 12–15.9)
HGB UR QL STRIP.AUTO: ABNORMAL
HYALINE CASTS UR QL AUTO: ABNORMAL /LPF
KETONES UR QL STRIP: NEGATIVE
LEUKOCYTE ESTERASE UR QL STRIP.AUTO: ABNORMAL
LIPASE SERPL-CCNC: 83 U/L (ref 13–60)
LYMPHOCYTES # BLD AUTO: 1.4 10*3/MM3 (ref 0.7–3.1)
LYMPHOCYTES NFR BLD AUTO: 18 % (ref 19.6–45.3)
MCH RBC QN AUTO: 31 PG (ref 26.6–33)
MCHC RBC AUTO-ENTMCNC: 33.4 G/DL (ref 31.5–35.7)
MCV RBC AUTO: 92.7 FL (ref 79–97)
MONOCYTES # BLD AUTO: 0.5 10*3/MM3 (ref 0.1–0.9)
MONOCYTES NFR BLD AUTO: 6.5 % (ref 5–12)
NEUTROPHILS NFR BLD AUTO: 5.4 10*3/MM3 (ref 1.7–7)
NEUTROPHILS NFR BLD AUTO: 71.4 % (ref 42.7–76)
NITRITE UR QL STRIP: NEGATIVE
NRBC BLD AUTO-RTO: 0 /100 WBC (ref 0–0.2)
PH UR STRIP.AUTO: 6.5 [PH] (ref 5–8)
PLATELET # BLD AUTO: 272 10*3/MM3 (ref 140–450)
PMV BLD AUTO: 8.2 FL (ref 6–12)
POTASSIUM SERPL-SCNC: 4.1 MMOL/L (ref 3.5–5.2)
PROT SERPL-MCNC: 7 G/DL (ref 6–8.5)
PROT UR QL STRIP: NEGATIVE
RBC # BLD AUTO: 3.99 10*6/MM3 (ref 3.77–5.28)
RBC # UR STRIP: ABNORMAL /HPF
REF LAB TEST METHOD: ABNORMAL
SODIUM SERPL-SCNC: 143 MMOL/L (ref 136–145)
SP GR UR STRIP: 1.02 (ref 1–1.03)
SQUAMOUS #/AREA URNS HPF: ABNORMAL /HPF
UROBILINOGEN UR QL STRIP: ABNORMAL
WBC # UR STRIP: ABNORMAL /HPF
WBC NRBC COR # BLD: 7.5 10*3/MM3 (ref 3.4–10.8)

## 2023-05-25 PROCEDURE — 74176 CT ABD & PELVIS W/O CONTRAST: CPT

## 2023-05-25 PROCEDURE — 25010000002 HYDROMORPHONE 1 MG/ML SOLUTION: Performed by: PHYSICIAN ASSISTANT

## 2023-05-25 PROCEDURE — 83690 ASSAY OF LIPASE: CPT | Performed by: PHYSICIAN ASSISTANT

## 2023-05-25 PROCEDURE — 87086 URINE CULTURE/COLONY COUNT: CPT | Performed by: PHYSICIAN ASSISTANT

## 2023-05-25 PROCEDURE — 99285 EMERGENCY DEPT VISIT HI MDM: CPT

## 2023-05-25 PROCEDURE — 76856 US EXAM PELVIC COMPLETE: CPT

## 2023-05-25 PROCEDURE — 93976 VASCULAR STUDY: CPT

## 2023-05-25 PROCEDURE — 81001 URINALYSIS AUTO W/SCOPE: CPT | Performed by: PHYSICIAN ASSISTANT

## 2023-05-25 PROCEDURE — 96375 TX/PRO/DX INJ NEW DRUG ADDON: CPT

## 2023-05-25 PROCEDURE — 80053 COMPREHEN METABOLIC PANEL: CPT | Performed by: PHYSICIAN ASSISTANT

## 2023-05-25 PROCEDURE — 85025 COMPLETE CBC W/AUTO DIFF WBC: CPT | Performed by: PHYSICIAN ASSISTANT

## 2023-05-25 PROCEDURE — 25010000002 ONDANSETRON PER 1 MG: Performed by: PHYSICIAN ASSISTANT

## 2023-05-25 PROCEDURE — 84443 ASSAY THYROID STIM HORMONE: CPT | Performed by: STUDENT IN AN ORGANIZED HEALTH CARE EDUCATION/TRAINING PROGRAM

## 2023-05-25 RX ORDER — ONDANSETRON 2 MG/ML
4 INJECTION INTRAMUSCULAR; INTRAVENOUS ONCE
Status: COMPLETED | OUTPATIENT
Start: 2023-05-25 | End: 2023-05-25

## 2023-05-25 RX ORDER — SODIUM CHLORIDE 0.9 % (FLUSH) 0.9 %
10 SYRINGE (ML) INJECTION AS NEEDED
Status: DISCONTINUED | OUTPATIENT
Start: 2023-05-25 | End: 2023-05-27 | Stop reason: HOSPADM

## 2023-05-25 RX ADMIN — ONDANSETRON 4 MG: 2 INJECTION INTRAMUSCULAR; INTRAVENOUS at 20:13

## 2023-05-25 RX ADMIN — HYDROMORPHONE HYDROCHLORIDE 0.5 MG: 1 INJECTION, SOLUTION INTRAMUSCULAR; INTRAVENOUS; SUBCUTANEOUS at 20:13

## 2023-05-25 NOTE — ED PROVIDER NOTES
Subjective   History of Present Illness       Patient is 72-year-old female comes in complaining of right-sided flank pain for the past 3 hours.  Patient states that she was told that she had some bilateral kidney stones that were diagnosed at [place] earlier in the week was discharged home.  Patient supposed to have a lithotripsy on Wednesday for this.  Patient states pain is about a 9 out of 10 on the right flank and will sometimes radiate down her leg other times not.  Patient states sometimes it is worse with movement but also is severe at rest.  Patient denies any fever, chills, nausea, vomiting, diarrhea, anterior abdominal pain, dysuria or urinary frequency.  Patient does report some emotional distress as her spouse and is admitted to [facility] currently as well.      Review of Systems   Constitutional: Negative for chills, fatigue and fever.   HENT: Negative for congestion, sore throat, tinnitus and trouble swallowing.    Eyes: Negative for photophobia, discharge and visual disturbance.   Respiratory: Negative for cough and shortness of breath.    Cardiovascular: Negative for chest pain and leg swelling.   Gastrointestinal: Negative for abdominal pain, diarrhea, nausea and vomiting.   Genitourinary: Positive for flank pain (right). Negative for dysuria, frequency and urgency.   Musculoskeletal: Negative for arthralgias and myalgias.   Skin: Negative for rash.   Neurological: Negative for dizziness and headaches.   Psychiatric/Behavioral: Negative for confusion.       Past Medical History:   Diagnosis Date   • Allergic As a teenager   • Anxiety    • Arthritis ??   • Breast cyst    • Cataract About 10 years ago   • Depression Dont know   • Fibrocystic breast    • GERD (gastroesophageal reflux disease)    • HL (hearing loss) ??   • Hyperlipidemia    • Hypertension    • Hypothyroidism    • Panic disorder        Allergies   Allergen Reactions   • Codeine Nausea And Vomiting   • Penicillin G Unknown (See  Comments)   • Amlodipine Swelling       Past Surgical History:   Procedure Laterality Date   • APPENDECTOMY     • BREAST BIOPSY     • BREAST SURGERY     • COLONOSCOPY  3 years ago   • HYSTERECTOMY         Family History   Problem Relation Age of Onset   • Heart disease Mother    • Arthritis Mother    • Hyperlipidemia Mother    • Heart disease Father    • Arthritis Father        Social History     Socioeconomic History   • Marital status:    Tobacco Use   • Smoking status: Never   • Smokeless tobacco: Never   • Tobacco comments:     NONE   Vaping Use   • Vaping Use: Never used   Substance and Sexual Activity   • Alcohol use: No   • Drug use: No   • Sexual activity: Not Currently     Partners: Male     Birth control/protection: None           Objective   Physical Exam  Vitals and nursing note reviewed.   Constitutional:       General: She is not in acute distress.     Appearance: She is well-developed. She is not diaphoretic.   HENT:      Head: Normocephalic and atraumatic.      Right Ear: External ear normal.      Left Ear: External ear normal.      Nose: Nose normal.      Mouth/Throat:      Mouth: Mucous membranes are moist.   Eyes:      Extraocular Movements: Extraocular movements intact.      Conjunctiva/sclera: Conjunctivae normal.      Pupils: Pupils are equal, round, and reactive to light.   Cardiovascular:      Rate and Rhythm: Normal rate and regular rhythm.      Pulses: Normal pulses.      Heart sounds: Normal heart sounds.      Comments: S1, S2 audible.  Pulmonary:      Effort: Pulmonary effort is normal. No respiratory distress.      Breath sounds: Normal breath sounds. No wheezing, rhonchi or rales.      Comments: On room air.  Abdominal:      General: Bowel sounds are normal. There is no distension.      Palpations: Abdomen is soft.      Tenderness: There is no abdominal tenderness. There is no right CVA tenderness, left CVA tenderness, guarding or rebound.   Musculoskeletal:         General: No  tenderness or deformity. Normal range of motion.      Cervical back: Normal range of motion.      Right lower leg: No edema.      Left lower leg: No edema.      Comments: Patient has good range of motion of right hip and right lower extremity despite pain.  Patient does describe some shooting pain down her right leg that is constant and not made better or worse with straight leg raise.  Patient denies any numbness tingling bilateral lower extremities, saddle anesthesia, urinary or stool incontinence.  No falls or trauma reported.  No tenderness of right flank or hips.   Skin:     General: Skin is warm.      Capillary Refill: Capillary refill takes less than 2 seconds.      Findings: No erythema or rash.   Neurological:      Mental Status: She is alert and oriented to person, place, and time.      Cranial Nerves: No cranial nerve deficit.   Psychiatric:         Mood and Affect: Mood normal.         Behavior: Behavior normal.         Procedures           ED Course  ED Course as of 05/26/23 0443   Thu May 25, 2023   2316 Awaiting US [RL]   Fri May 26, 2023   0025 Awaiting US [RL]   0219 Records from [facility] were obtained and patient had recent admission on 2/23/2023 and discharged on 2/24/2023 and patient had intermittent abdominal pain secondary to 5 mm left obstructing proximal ureteral stone and had stents placed by urology, Dr. Rose.  Patient had right ovarian cyst and  pending, OB/GYN consulted at that time.  At that time patient had simple cystic structure measuring 6.3 x 4.9 x 4.3 cm, right adnexa region.  Findings may represent right ovarian cyst.  Follow-up ultrasound in 6 weeks may be obtained as clinically warranted.  No free fluid. [RL]   0222 Spoke with Dr. Brito excepted patient behalf hospitalist Psychiatric further work-up and treatment [RL]      ED Course User Index  [RL] Alonso Finney PA      /62 (BP Location: Right arm, Patient Position: Lying)   Pulse 80    "Temp 98 °F (36.7 °C) (Oral)   Resp 18   Ht 162.6 cm (64\")   Wt 73.2 kg (161 lb 6 oz)   LMP  (LMP Unknown)   SpO2 96%   BMI 27.70 kg/m²   Labs Reviewed   COMPREHENSIVE METABOLIC PANEL - Abnormal; Notable for the following components:       Result Value    Glucose 111 (*)     Alkaline Phosphatase 144 (*)     BUN/Creatinine Ratio 32.3 (*)     All other components within normal limits    Narrative:     GFR Normal >60  Chronic Kidney Disease <60  Kidney Failure <15    The GFR formula is only valid for adults with stable renal function between ages 18 and 70.   LIPASE - Abnormal; Notable for the following components:    Lipase 83 (*)     All other components within normal limits   URINALYSIS W/ MICROSCOPIC IF INDICATED (NO CULTURE) - Abnormal; Notable for the following components:    Blood, UA Small (1+) (*)     Leuk Esterase, UA Moderate (2+) (*)     All other components within normal limits   CBC WITH AUTO DIFFERENTIAL - Abnormal; Notable for the following components:    Lymphocyte % 18.0 (*)     All other components within normal limits   URINALYSIS, MICROSCOPIC ONLY - Abnormal; Notable for the following components:    RBC, UA 13-20 (*)     WBC, UA 31-50 (*)     All other components within normal limits   TSH - Normal   URINE CULTURE   BLOOD CULTURE   BLOOD CULTURE   CBC (NO DIFF)   BASIC METABOLIC PANEL   LIPASE   CBC AND DIFFERENTIAL    Narrative:     The following orders were created for panel order CBC & Differential.  Procedure                               Abnormality         Status                     ---------                               -----------         ------                     CBC Auto Differential[[id number]]        Abnormal            Final result                 Please view results for these tests on the individual orders.     CT Abdomen Pelvis Without Contrast    Result Date: 5/25/2023  1.Bilateral nonobstructing renal stones. 2.Bibasilar opacities may represent atelectasis or infiltrates. " 3.5.5 cm right ovarian cystic lesion. 4.Diverticulosis without diverticulitis. Case discussed with clinician LUCY ARGUELLES @ 5/25/2023 10:35 PM EDT. Electronically Signed: Clint Woodard  5/25/2023 10:35 PM EDT  Workstation ID: [id number]    US Pelvis Complete    Result Date: 5/26/2023  1. Cystic mass in the right ovary measuring up to 5.8 cm. This is considered abnormal in a patient of this age. OB/GYN consultation is recommended for follow-up. If this is not performed, follow-up is recommended per guidelines below. 2. No evidence of right ovarian torsion. Management of Ovarian Cyst: Simple Cyst (cystic, no solid component, thin walls) Low Risk (Pre-menopausal) * < 5 cm ? Done, no follow up * 5-7 cm ? Yearly u/s follow up * > 7cm ? MRI or surgery High Risk (post-menopausal, personal of family hx, genetic risk) * < 2cm - Done, no follow up * 2-7cm ? Yearly follow up * > 7cm - MRI or surgery Hemorrhagic Cyst (cystic Low Risk (Pre-menopausal) * < 5 cm ? Done , no f/u needed * > 5 cm ? 6-12 week u/s follow up: - Resolved ? done - Unchanged - MRI High Risk (post-menopausal, personal of family hx, genetic risk) * < 5cm * Early Menopause-6-12 week f/u u/s - Resolved ? done - Unchanged ? MRI * Late Menopause - MRI or surgery * > 5 cm - MRI or Surgery Any Other Cyst Low Risk (Pre-menopausal) * Thin septation/small calcification ? 6-12 f/u ultrasound - Resolved ? done - Unchanged ? MRI * Multiple thin/thick septations, solid component, ascites, LA - Surgery High Risk (post-menopausal, personal of family hx, genetic risk) * Thin septation/small calcification - MRI vs surgery * Multiple/thick septations, solid w/ flow, ascites, LA - Surgery Management of Asymptomatic Ovarian and Other Adnexal Cysts Imaged at US; Society of Radiologists in Ultrasound Consensus Conference Statement; Ultrasound Quarterly Electronically signed by:  Gregorio Tipton M.D.  5/25/2023 10:29 PM Mountain Time                                         MDM     DDX:  "Ureterolithiasis, pyelonephritis, sciatica, number to be an all-inclusive list.    Chart review:      EKG: Not indicated    Imaging: CT abdomen pelvis without contrast independently interpreted by myself shows bilateral renal stones at the level of kidney, no stranding of the kidneys apparent.  Official radiology read shows bilateral nonobstructing renal stones.  Bibasilar opacities may represent atelectasis or infiltrates.  5.5 cm right ovarian cystic lesion.  Ultrasound ovaries show cystic mass in the right ovary measuring up to 5.8 cm this is considered abnormal and the patient's age OB/GYN consultation is recommended for follow-up.  Did not no evidence of right ovarian torsion.      Labs: Lab work interpreted by myself shows UA shows 1+ blood, 31-50 WBCs and 2+ leukocyte esterase.  Urine culture pending.  CBC and CMP largely unremarkable for acute findings.    Vitals:  /62 (BP Location: Right arm, Patient Position: Lying)   Pulse 80   Temp 98 °F (36.7 °C) (Oral)   Resp 18   Ht 162.6 cm (64\")   Wt 73.2 kg (161 lb 6 oz)   LMP  (LMP Unknown)   SpO2 96%   BMI 27.70 kg/m²     Medications given:    Medications   sodium chloride 0.9 % flush 10 mL (has no administration in time range)   cefTRIAXone (ROCEPHIN) 1 g in sodium chloride 0.9 % 100 mL IVPB (has no administration in time range)   ketorolac (TORADOL) injection 15 mg (has no administration in time range)   acetaminophen (TYLENOL) tablet 650 mg (has no administration in time range)   ARIPiprazole (ABILIFY) tablet 10 mg (has no administration in time range)   atenolol (TENORMIN) tablet 50 mg (has no administration in time range)   buPROPion XL (WELLBUTRIN XL) 24 hr tablet 150 mg (has no administration in time range)   clonazePAM (KlonoPIN) tablet 0.5 mg (has no administration in time range)   donepezil (ARICEPT) tablet 10 mg (has no administration in time range)   levothyroxine (SYNTHROID, LEVOTHROID) tablet 112 mcg (has no administration in time " range)   sodium chloride 0.9 % flush 10 mL (has no administration in time range)   sodium chloride 0.9 % flush 10 mL (has no administration in time range)   sodium chloride 0.9 % infusion 40 mL (has no administration in time range)   sennosides-docusate (PERICOLACE) 8.6-50 MG per tablet 2 tablet (has no administration in time range)     And   polyethylene glycol (MIRALAX) packet 17 g (has no administration in time range)     And   bisacodyl (DULCOLAX) EC tablet 5 mg (has no administration in time range)     And   bisacodyl (DULCOLAX) suppository 10 mg (has no administration in time range)   ondansetron (ZOFRAN) tablet 4 mg (has no administration in time range)     Or   ondansetron (ZOFRAN) injection 4 mg (has no administration in time range)   nitroglycerin (NITROSTAT) SL tablet 0.4 mg (has no administration in time range)   lactated ringers infusion (75 mL/hr Intravenous New Bag 5/26/23 0351)   HYDROmorphone (DILAUDID) injection 0.5 mg (0.5 mg Intravenous Given 5/25/23 2013)   ondansetron (ZOFRAN) injection 4 mg (4 mg Intravenous Given 5/25/23 2013)   HYDROmorphone (DILAUDID) injection 0.5 mg (0.5 mg Intravenous Given 5/26/23 0031)   cefTRIAXone (ROCEPHIN) 1 g in sodium chloride 0.9 % 100 mL IVPB (0 g Intravenous Stopped 5/26/23 0200)       Procedures:  Not indicated  MDM: Patient is a 72-year-old female comes in complaining of right flank pain.  IV established.  Patient symptoms are sudden and severe.  Lab work interpreted by myself shows UA shows 1+ blood, 31-50 WBCs and 2+ leukocyte esterase.  Urine culture pending.  CBC and CMP largely unremarkable for acute findings.  CT abdomen pelvis without contrast independently interpreted by myself shows bilateral renal stones at the level of kidney, no stranding of the kidneys apparent.  Official radiology read shows bilateral nonobstructing renal stones.  Bibasilar opacities may represent atelectasis or infiltrates.  5.5 cm right ovarian cystic lesion.  Ultrasound  ovaries show cystic mass in the right ovary measuring up to 5.8 cm this is considered abnormal and the patient's age OB/GYN consultation is recommended for follow-up.  Did not no evidence of right ovarian torsion.  On reevaluation patient complains of continued pain even after 2 rounds of Dilaudid however patient lethargic on exam and I am concerned of patient's generalized weakness.  Patient also lives at home alone currently as her spouse is currently admitted to the hospital.  Patient given Rocephin for apparent UTI and Zofran for nausea.  Spoke with Dr. Hankins who accepted patient behalf of hospitalist team for admission to the hospital for further IV antibiotics for UTI as well as pain control and likely gynecology consult.  Review of records from [facility] did show that patient had an ovarian cyst but unclear where the work-up is at this time.   Results and plan discussed with patient and is agreeable with plan.    Final diagnoses:   Right flank pain   Ovarian mass, right   Intractable pain       ED Disposition  ED Disposition     ED Disposition   Decision to Admit    Condition   --    Comment   Level of Care: Med/Surg [1]   Admitting Physician: JULIAN HANKINS [[id number]]   Attending Physician: JULIAN HANKINS [[id number]]               No follow-up provider specified.       Medication List      ASK your doctor about these medications    * ARIPiprazole 10 MG tablet  Commonly known as: ABILIFY  Take 1 tablet by mouth Daily.  Ask about: Which instructions should I use?     * ARIPiprazole 5 MG tablet  Commonly known as: ABILIFY  Ask about: Which instructions should I use?     * gemfibrozil 600 MG tablet  Commonly known as: LOPID  Ask about: Which instructions should I use?     * gemfibrozil 600 MG tablet  Commonly known as: LOPID  TAKE 1 TABLET TWICE DAILY BEFORE MEALS  Ask about: Which instructions should I use?         * This list has 4 medication(s) that are the same as other medications prescribed for you.  Read the directions carefully, and ask your doctor or other care provider to review them with you.                 Alonso Finney PA  05/26/23 0442

## 2023-05-25 NOTE — ED NOTES
Complaints of right sided flank pain that started about 2-3 hrs ago. Patient states that she is suppose to have lithotripsy on Wednesday and is unsure if her pain is related to her renal stones or something else. Patient states that her pain is radiating down into her right leg

## 2023-05-25 NOTE — ED TRIAGE NOTES
Pt arrived via [facility] c/o right sided flank pain that increased this afternoon. Pt states she she is due for surgery for her kidney stones on Wednesday.

## 2023-05-26 PROBLEM — R10.9 RIGHT FLANK PAIN: Status: ACTIVE | Noted: 2023-05-26

## 2023-05-26 LAB
ANION GAP SERPL CALCULATED.3IONS-SCNC: 11 MMOL/L (ref 5–15)
BUN SERPL-MCNC: 16 MG/DL (ref 8–23)
BUN/CREAT SERPL: 25.4 (ref 7–25)
CALCIUM SPEC-SCNC: 9.6 MG/DL (ref 8.6–10.5)
CANCER AG125 SERPL QL: 10 U/ML (ref 0–38.1)
CHLORIDE SERPL-SCNC: 102 MMOL/L (ref 98–107)
CO2 SERPL-SCNC: 25 MMOL/L (ref 22–29)
CREAT SERPL-MCNC: 0.63 MG/DL (ref 0.57–1)
DEPRECATED RDW RBC AUTO: 46.4 FL (ref 37–54)
EGFRCR SERPLBLD CKD-EPI 2021: 93.2 ML/MIN/1.73
ERYTHROCYTE [DISTWIDTH] IN BLOOD BY AUTOMATED COUNT: 14.1 % (ref 12.3–15.4)
GLUCOSE SERPL-MCNC: 119 MG/DL (ref 65–99)
HCT VFR BLD AUTO: 36.5 % (ref 34–46.6)
HGB BLD-MCNC: 12.1 G/DL (ref 12–15.9)
LIPASE SERPL-CCNC: 36 U/L (ref 13–60)
MCH RBC QN AUTO: 30.9 PG (ref 26.6–33)
MCHC RBC AUTO-ENTMCNC: 33.2 G/DL (ref 31.5–35.7)
MCV RBC AUTO: 92.9 FL (ref 79–97)
PLATELET # BLD AUTO: 261 10*3/MM3 (ref 140–450)
PMV BLD AUTO: 8.3 FL (ref 6–12)
POTASSIUM SERPL-SCNC: 4.1 MMOL/L (ref 3.5–5.2)
RBC # BLD AUTO: 3.92 10*6/MM3 (ref 3.77–5.28)
SODIUM SERPL-SCNC: 138 MMOL/L (ref 136–145)
TSH SERPL DL<=0.05 MIU/L-ACNC: 1.5 UIU/ML (ref 0.27–4.2)
WBC NRBC COR # BLD: 9.4 10*3/MM3 (ref 3.4–10.8)

## 2023-05-26 PROCEDURE — 25010000002 KETOROLAC TROMETHAMINE PER 15 MG: Performed by: STUDENT IN AN ORGANIZED HEALTH CARE EDUCATION/TRAINING PROGRAM

## 2023-05-26 PROCEDURE — 86304 IMMUNOASSAY TUMOR CA 125: CPT | Performed by: OBSTETRICS & GYNECOLOGY

## 2023-05-26 PROCEDURE — 96375 TX/PRO/DX INJ NEW DRUG ADDON: CPT

## 2023-05-26 PROCEDURE — G0378 HOSPITAL OBSERVATION PER HR: HCPCS

## 2023-05-26 PROCEDURE — 25010000002 HYDROMORPHONE 1 MG/ML SOLUTION: Performed by: PHYSICIAN ASSISTANT

## 2023-05-26 PROCEDURE — 87040 BLOOD CULTURE FOR BACTERIA: CPT | Performed by: STUDENT IN AN ORGANIZED HEALTH CARE EDUCATION/TRAINING PROGRAM

## 2023-05-26 PROCEDURE — 83690 ASSAY OF LIPASE: CPT | Performed by: STUDENT IN AN ORGANIZED HEALTH CARE EDUCATION/TRAINING PROGRAM

## 2023-05-26 PROCEDURE — 25010000002 CEFTRIAXONE PER 250 MG: Performed by: PHYSICIAN ASSISTANT

## 2023-05-26 PROCEDURE — 96376 TX/PRO/DX INJ SAME DRUG ADON: CPT

## 2023-05-26 PROCEDURE — 97162 PT EVAL MOD COMPLEX 30 MIN: CPT | Performed by: PHYSICAL THERAPIST

## 2023-05-26 PROCEDURE — 80048 BASIC METABOLIC PNL TOTAL CA: CPT | Performed by: STUDENT IN AN ORGANIZED HEALTH CARE EDUCATION/TRAINING PROGRAM

## 2023-05-26 PROCEDURE — 96365 THER/PROPH/DIAG IV INF INIT: CPT

## 2023-05-26 PROCEDURE — 96361 HYDRATE IV INFUSION ADD-ON: CPT

## 2023-05-26 PROCEDURE — 85027 COMPLETE CBC AUTOMATED: CPT | Performed by: STUDENT IN AN ORGANIZED HEALTH CARE EDUCATION/TRAINING PROGRAM

## 2023-05-26 PROCEDURE — 97166 OT EVAL MOD COMPLEX 45 MIN: CPT

## 2023-05-26 RX ORDER — BUPROPION HYDROCHLORIDE 150 MG/1
150 TABLET ORAL DAILY
Status: DISCONTINUED | OUTPATIENT
Start: 2023-05-26 | End: 2023-05-27 | Stop reason: HOSPADM

## 2023-05-26 RX ORDER — ARIPIPRAZOLE 5 MG/1
10 TABLET ORAL DAILY
Status: DISCONTINUED | OUTPATIENT
Start: 2023-05-26 | End: 2023-05-27 | Stop reason: HOSPADM

## 2023-05-26 RX ORDER — ACETAMINOPHEN 325 MG/1
650 TABLET ORAL EVERY 6 HOURS PRN
Status: DISCONTINUED | OUTPATIENT
Start: 2023-05-26 | End: 2023-05-27 | Stop reason: HOSPADM

## 2023-05-26 RX ORDER — SODIUM CHLORIDE 0.9 % (FLUSH) 0.9 %
10 SYRINGE (ML) INJECTION AS NEEDED
Status: DISCONTINUED | OUTPATIENT
Start: 2023-05-26 | End: 2023-05-27 | Stop reason: HOSPADM

## 2023-05-26 RX ORDER — ONDANSETRON 4 MG/1
4 TABLET, FILM COATED ORAL EVERY 6 HOURS PRN
Status: DISCONTINUED | OUTPATIENT
Start: 2023-05-26 | End: 2023-05-27 | Stop reason: HOSPADM

## 2023-05-26 RX ORDER — KETOROLAC TROMETHAMINE 15 MG/ML
15 INJECTION, SOLUTION INTRAMUSCULAR; INTRAVENOUS EVERY 6 HOURS PRN
Status: DISCONTINUED | OUTPATIENT
Start: 2023-05-26 | End: 2023-05-27 | Stop reason: HOSPADM

## 2023-05-26 RX ORDER — TIZANIDINE 4 MG/1
4 TABLET ORAL EVERY 8 HOURS PRN
Status: DISCONTINUED | OUTPATIENT
Start: 2023-05-26 | End: 2023-05-27 | Stop reason: HOSPADM

## 2023-05-26 RX ORDER — ATENOLOL 50 MG/1
50 TABLET ORAL DAILY
Status: DISCONTINUED | OUTPATIENT
Start: 2023-05-26 | End: 2023-05-27 | Stop reason: HOSPADM

## 2023-05-26 RX ORDER — SODIUM CHLORIDE 0.9 % (FLUSH) 0.9 %
10 SYRINGE (ML) INJECTION EVERY 12 HOURS SCHEDULED
Status: DISCONTINUED | OUTPATIENT
Start: 2023-05-26 | End: 2023-05-27 | Stop reason: HOSPADM

## 2023-05-26 RX ORDER — NITROGLYCERIN 0.4 MG/1
0.4 TABLET SUBLINGUAL
Status: DISCONTINUED | OUTPATIENT
Start: 2023-05-26 | End: 2023-05-27 | Stop reason: HOSPADM

## 2023-05-26 RX ORDER — SODIUM CHLORIDE 9 MG/ML
40 INJECTION, SOLUTION INTRAVENOUS AS NEEDED
Status: DISCONTINUED | OUTPATIENT
Start: 2023-05-26 | End: 2023-05-27 | Stop reason: HOSPADM

## 2023-05-26 RX ORDER — BISACODYL 10 MG
10 SUPPOSITORY, RECTAL RECTAL DAILY PRN
Status: DISCONTINUED | OUTPATIENT
Start: 2023-05-26 | End: 2023-05-27 | Stop reason: HOSPADM

## 2023-05-26 RX ORDER — POLYETHYLENE GLYCOL 3350 17 G/17G
17 POWDER, FOR SOLUTION ORAL DAILY PRN
Status: DISCONTINUED | OUTPATIENT
Start: 2023-05-26 | End: 2023-05-27 | Stop reason: HOSPADM

## 2023-05-26 RX ORDER — ARIPIPRAZOLE 5 MG/1
10 TABLET ORAL DAILY
COMMUNITY
Start: 2023-03-20

## 2023-05-26 RX ORDER — ONDANSETRON 2 MG/ML
4 INJECTION INTRAMUSCULAR; INTRAVENOUS EVERY 6 HOURS PRN
Status: DISCONTINUED | OUTPATIENT
Start: 2023-05-26 | End: 2023-05-27 | Stop reason: HOSPADM

## 2023-05-26 RX ORDER — HYDROCODONE BITARTRATE AND ACETAMINOPHEN 5; 325 MG/1; MG/1
1 TABLET ORAL EVERY 6 HOURS PRN
Status: DISCONTINUED | OUTPATIENT
Start: 2023-05-26 | End: 2023-05-27 | Stop reason: HOSPADM

## 2023-05-26 RX ORDER — SODIUM CHLORIDE, SODIUM LACTATE, POTASSIUM CHLORIDE, CALCIUM CHLORIDE 600; 310; 30; 20 MG/100ML; MG/100ML; MG/100ML; MG/100ML
75 INJECTION, SOLUTION INTRAVENOUS CONTINUOUS
Status: DISCONTINUED | OUTPATIENT
Start: 2023-05-26 | End: 2023-05-27

## 2023-05-26 RX ORDER — BISACODYL 5 MG/1
5 TABLET, DELAYED RELEASE ORAL DAILY PRN
Status: DISCONTINUED | OUTPATIENT
Start: 2023-05-26 | End: 2023-05-27 | Stop reason: HOSPADM

## 2023-05-26 RX ORDER — GEMFIBROZIL 600 MG/1
600 TABLET, FILM COATED ORAL EVERY 12 HOURS
COMMUNITY

## 2023-05-26 RX ORDER — CLONAZEPAM 0.5 MG/1
0.5 TABLET ORAL 2 TIMES DAILY PRN
Status: DISCONTINUED | OUTPATIENT
Start: 2023-05-26 | End: 2023-05-27 | Stop reason: HOSPADM

## 2023-05-26 RX ORDER — LEVOTHYROXINE SODIUM 112 UG/1
112 TABLET ORAL
Status: DISCONTINUED | OUTPATIENT
Start: 2023-05-26 | End: 2023-05-27 | Stop reason: HOSPADM

## 2023-05-26 RX ORDER — AMOXICILLIN 250 MG
2 CAPSULE ORAL 2 TIMES DAILY
Status: DISCONTINUED | OUTPATIENT
Start: 2023-05-26 | End: 2023-05-27 | Stop reason: HOSPADM

## 2023-05-26 RX ORDER — DONEPEZIL HYDROCHLORIDE 5 MG/1
10 TABLET, FILM COATED ORAL NIGHTLY
Status: DISCONTINUED | OUTPATIENT
Start: 2023-05-26 | End: 2023-05-27 | Stop reason: HOSPADM

## 2023-05-26 RX ADMIN — KETOROLAC TROMETHAMINE 15 MG: 15 INJECTION, SOLUTION INTRAMUSCULAR; INTRAVENOUS at 15:27

## 2023-05-26 RX ADMIN — Medication 10 ML: at 08:59

## 2023-05-26 RX ADMIN — SODIUM CHLORIDE, POTASSIUM CHLORIDE, SODIUM LACTATE AND CALCIUM CHLORIDE 75 ML/HR: 600; 310; 30; 20 INJECTION, SOLUTION INTRAVENOUS at 03:51

## 2023-05-26 RX ADMIN — BUPROPION HYDROCHLORIDE 150 MG: 150 TABLET, EXTENDED RELEASE ORAL at 08:58

## 2023-05-26 RX ADMIN — SENNOSIDES AND DOCUSATE SODIUM 2 TABLET: 50; 8.6 TABLET ORAL at 20:06

## 2023-05-26 RX ADMIN — HYDROCODONE BITARTRATE AND ACETAMINOPHEN 1 TABLET: 5; 325 TABLET ORAL at 10:55

## 2023-05-26 RX ADMIN — CEFTRIAXONE 1 G: 1 INJECTION, POWDER, FOR SOLUTION INTRAMUSCULAR; INTRAVENOUS at 01:30

## 2023-05-26 RX ADMIN — KETOROLAC TROMETHAMINE 15 MG: 15 INJECTION, SOLUTION INTRAMUSCULAR; INTRAVENOUS at 08:58

## 2023-05-26 RX ADMIN — HYDROMORPHONE HYDROCHLORIDE 0.5 MG: 1 INJECTION, SOLUTION INTRAMUSCULAR; INTRAVENOUS; SUBCUTANEOUS at 00:31

## 2023-05-26 RX ADMIN — ATENOLOL 50 MG: 50 TABLET ORAL at 08:58

## 2023-05-26 RX ADMIN — Medication 10 ML: at 20:06

## 2023-05-26 RX ADMIN — SENNOSIDES AND DOCUSATE SODIUM 2 TABLET: 50; 8.6 TABLET ORAL at 08:59

## 2023-05-26 RX ADMIN — DONEPEZIL HYDROCHLORIDE 10 MG: 5 TABLET, FILM COATED ORAL at 20:06

## 2023-05-26 RX ADMIN — SODIUM CHLORIDE, POTASSIUM CHLORIDE, SODIUM LACTATE AND CALCIUM CHLORIDE 75 ML/HR: 600; 310; 30; 20 INJECTION, SOLUTION INTRAVENOUS at 17:07

## 2023-05-26 RX ADMIN — LEVOTHYROXINE SODIUM 112 MCG: 0.11 TABLET ORAL at 05:33

## 2023-05-26 RX ADMIN — ARIPIPRAZOLE 10 MG: 5 TABLET ORAL at 08:59

## 2023-05-26 NOTE — PLAN OF CARE
Goal Outcome Evaluation:  Plan of Care Reviewed With: patient        Progress: no change  Outcome Evaluation: PT admitted to unit today. PT oriented to room and falls risk program. PT requires assist X1 to bathroom. PT educated on call light and TV in room. PT on room air while awake but requires O2 while asleep at times.

## 2023-05-26 NOTE — PLAN OF CARE
Goal Outcome Evaluation:  Plan of Care Reviewed With: patient           Outcome Evaluation: Patient is a 73 y/o F who was admitted 23 with c/o R flank pain. Pt found to have R ovarian cyst, bilateral non obstruction renal stones, and UTI. PMHx: GERD, anxiety, depression, HTN, HLD, hypothyroidism. Pt is scheduled to have lithotripsy procedure next Wed on outpatient basis.  Pt reports she lives in a one story home with one step to enter with . Pt's  is in [facility] and just underwent open heart surgery. Pt reports being independent with ADLs, IADLs, transfers, mobility, and driving. Pt owns no AD. Pt completed bed mobility with min A x1 for supine to sit and sit to supine.  Required max A x2 to be scooted up in bed this date due to weakness and pain in R sided low back.  Once sitting EOB pt c/o dizziness and headache.  BP taken reading 99/61.  Pt also had c/o getting too hot.  Pt was assisted back to supine and assisted up in bed.  Nursing notified of pt's symptoms.  Pt unable to assist with scooting up in bed due to weakness and low back pain.  BP taken 2 more times (supine position) readin/57 and ~5 minutes later reading 102/50.  Pt presents with deficits in functional mobility, strength, activity tolerance, and slight cognitive deficits present. At this time PT recommends SNF pending her progress in acute setting.  If pt's mobility/symptoms/cognitiion improves, then she could hopefully d/c home at that time with home health.  In current state, based on findings today, SNF recommended.

## 2023-05-26 NOTE — H&P
[facility] Medicine Services      Patient Name: Shannon Dyson  : 1950  MRN: [id number]  Primary Care Physician:  Ana Paula Braun APRN  Date of admission: 2023      Subjective      Chief Complaint: right flank pain    History of Present Illness: Shannon Dyson is a 72 y.o. female who presented to [facility] on 2023 complaining of right flank pain.  Of note, due to patient condition, hpi obtained from available records and discussion with ER staff.  Reports sharp right sided flank pain that started around 2 or 3pm today that radiates down leg and is constant.   is in hospital and she is reportedly having issues taking care of herself.  Denies chest pain, dyspnea, fevers, chills, nausea, vomiting.  Presented to [facility] for evaluation.    In the ER, vitals 99.1F, HR 80, RR 18, /81, 96% 3L NC.  Labs notable for glucose 111, lipase 83.  UA shows possible UTI.  CT a/p shows bilateral non-obstructing renal stones complicated by bibasilar opacities that may represent atelectasis or infiltrated along with 5.5cm right ovarian cystic lesion.  Pelvic US shows cystic mass in the right ovary measuring up to 5.8cm with no evidence of right ovarian torsion.      ROS   Unable to obtain due to lethargy    Personal History     Past Medical History:   Diagnosis Date   • Allergic As a teenager   • Anxiety    • Arthritis ??   • Breast cyst    • Cataract About 10 years ago   • Depression Dont know   • Fibrocystic breast    • GERD (gastroesophageal reflux disease)    • HL (hearing loss) ??   • Hyperlipidemia    • Hypertension    • Hypothyroidism    • Panic disorder        Past Surgical History:   Procedure Laterality Date   • APPENDECTOMY     • BREAST BIOPSY     • BREAST SURGERY     • COLONOSCOPY  3 years ago   • HYSTERECTOMY         Family History: family history includes Arthritis in her father and mother; Heart disease in her father and mother; Hyperlipidemia in her  mother. Otherwise pertinent FHx was reviewed and not pertinent to current issue.    Social History:  reports that she has never smoked. She has never used smokeless tobacco. She reports that she does not drink alcohol and does not use drugs.    Home Medications:  Prior to Admission Medications     Prescriptions Last Dose Informant Patient Reported? Taking?    gemfibrozil (LOPID) 600 MG tablet   No No    TAKE 1 TABLET TWICE DAILY BEFORE MEALS    ARIPiprazole (ABILIFY) 10 MG tablet   No No    Take 1 tablet by mouth Daily.    atenolol (TENORMIN) 50 MG tablet   No No    TAKE 1 TABLET EVERY DAY    buPROPion XL (WELLBUTRIN XL) 150 MG 24 hr tablet   No No    TAKE 1 TABLET EVERY MORNING    celecoxib (CeleBREX) 100 MG capsule   Yes No    Take 100 mg by mouth 2 (Two) Times a Day As Needed.    Cholecalciferol (Vitamin D3) 50 MCG (2000 UT) tablet   No No    Take 1 tablet by mouth Daily.    clonazePAM (KlonoPIN) 0.5 MG tablet   No No    Take 1 tablet by mouth 2 (Two) Times a Day.    dicyclomine (Bentyl) 10 MG capsule   No No    Take 1 capsule by mouth 4 (Four) Times a Day Before Meals & at Bedtime.    donepezil (ARICEPT) 10 MG tablet   No No    TAKE 1 TABLET BY MOUTH EVERY NIGHT.    doxepin (SINEquan) 100 MG capsule   No No    TAKE 1 CAPSULE BY MOUTH EVERY NIGHT.    escitalopram (Lexapro) 20 MG tablet   No No    Take 1 tablet by mouth Daily.    levothyroxine (SYNTHROID, LEVOTHROID) 112 MCG tablet   No No    TAKE 1 TABLET EVERY DAY    losartan (COZAAR) 100 MG tablet   No No    TAKE 1 TABLET EVERY DAY            Allergies:  Allergies   Allergen Reactions   • Codeine Nausea And Vomiting   • Penicillin G Unknown (See Comments)   • Amlodipine Swelling       Objective      Vitals:   Temp:  [99.1 °F (37.3 °C)] 99.1 °F (37.3 °C)  Heart Rate:  [68-84] 80  Resp:  [16-18] 18  BP: (107-146)/(62-81) 114/81  Flow (L/min):  [2-3] 3    Physical Exam  Constitutional:       Appearance: She is obese.      Comments: AAOx1-2, lethargic, waxing and  waning   HENT:      Head: Normocephalic and atraumatic.      Nose: Nose normal. No congestion.      Mouth/Throat:      Pharynx: Oropharynx is clear. No oropharyngeal exudate.   Eyes:      General: No scleral icterus.  Cardiovascular:      Rate and Rhythm: Normal rate and regular rhythm.      Heart sounds: No murmur heard.    No friction rub. No gallop.   Pulmonary:      Effort: No respiratory distress.      Breath sounds: No wheezing or rales.   Abdominal:      General: There is no distension.      Tenderness: There is abdominal tenderness. There is no guarding.   Musculoskeletal:         General: No swelling or deformity.      Cervical back: Normal range of motion. No rigidity.      Right lower leg: No edema.      Left lower leg: No edema.   Skin:     Coloration: Skin is not jaundiced.      Findings: No bruising or lesion.   Neurological:      General: No focal deficit present.      Mental Status: She is disoriented.      Motor: Weakness present.          Result Review    Result Review:  I have personally reviewed the results from the time of this admission to 5/26/2023 02:56 EDT and agree with these findings:  [x]  Laboratory  []  Microbiology  [x]  Radiology  []  EKG/Telemetry   []  Cardiology/Vascular   []  Pathology  []  Old records  []  Other:        Assessment & Plan        Active Hospital Problems:  There are no active hospital problems to display for this patient.    Plan:     #Right Ovarian Cyst  #Bilateral non-obstructing renal stone    - CT a/p shows bilateral non-obstructing renal stones complicated by bibasilar opacities that may represent atelectasis or infiltrated along with 5.5cm right ovarian cystic lesion.      -Pelvic US shows cystic mass in the right ovary measuring up to 5.8cm with no evidence of right ovarian torsion.    - Due to lethargy, unable to get complete history or accurately assess    - pain possibly from ovarian cyst, will have OBGYN assess    - As kidney stones non-obstructing, will  refrain from urology consult at this time, reportedly has otpt urology appointment for lithotripsy    - Avoid opioids due to lethargy    - Toradol PRN pain    - Patient without clinical signs of pneumonia, CT chest from 2018 shows bibasilar atelectasis, suspect this is what showed up on CT a/p    - lipase 85, no evidence of pancreatic inflammation on CT     - NPO except meds    - IVF    #UTI    - UA concerning for UTI, could also be inflammation related to kidney stones    - rocephin    - Urine culture    #Mild cognitive disorder    - follows with Dr. Seipel    - resume home Aricept    - unsure of baseline    - patient's spouse is hospitalized, reportedly she is unable to care for self, may need placement    - pt/ot    #GERD    - PPI    #Anxiety  #Depression    - resume home Abilify    - resume home Wellbutrin and Lexapro    - Resume home Klonopin PRN    #Hypothyroid    - resume home synthroid    #HTN    - BP stable hold home losartan    - resume home atenolol    #HLD    - resume home Gemfibrozil if brings in, not on formular      DVT prophylaxis: SCDs    CODE STATUS:       Admission Status:  I believe this patient meets observatoin status.    I discussed the patient's findings and my recommendations with patient.    This patient has been examined wearing appropriate Personal Protective Equipment and discussed with Patient and ED staff. 05/26/23      Signature: Electronically signed by Jacky Brito DO, 05/26/23, 3:18 AM EDT.

## 2023-05-26 NOTE — PLAN OF CARE
Problem: Adult Inpatient Plan of Care  Goal: Patient-Specific Goal (Individualized)  Outcome: Ongoing, Progressing   Goal Outcome Evaluation:

## 2023-05-26 NOTE — PROGRESS NOTES
[facility] Medicine Services   Daily Progress Note    Patient Name: Shannon Dyson  : 1950  MRN: [id number]  Primary Care Physician:  Ana Paula Braun APRN  Date of admission: 2023  Date and Time of Service: 23 at 8:15am      Subjective      Chief Complaint: right flank pain    Patient was seen and examined this am. In chair awake and alert. Tells me about her  in ICU. Starting to have Rt flank pain. Awaiting gynecology eval    ROS   Per HPI      Objective      Vitals:   Temp:  [98 °F (36.7 °C)-99.1 °F (37.3 °C)] 98 °F (36.7 °C)  Heart Rate:  [68-84] 80  Resp:  [16-18] 18  BP: (107-146)/(62-81) 129/62  Flow (L/min):  [2-3] 2    Physical Exam   Constitutional:       Appearance: She is obese.      Comments: Awake and alert, conversational, concerned about how  is doing in ICU  HENT:      Head: Normocephalic and atraumatic.      Nose: Nose normal. No congestion.      Mouth/Throat:      Pharynx: Oropharynx is clear. No oropharyngeal exudate.   Eyes:      General: No scleral icterus.  Cardiovascular:      Rate and Rhythm: Normal rate and regular rhythm.      Heart sounds: No murmur heard.    No friction rub. No gallop.   Pulmonary:      Effort: No respiratory distress.      Breath sounds: No wheezing or rales.   Abdominal:      General: There is no distension.      Tenderness: There is mild Rt flank tenderness. There is no guarding.   Musculoskeletal:         General: No swelling or deformity.      Cervical back: Normal range of motion. No rigidity.      Right lower leg: No edema.      Left lower leg: No edema.   Skin:     Coloration: Skin is not jaundiced.      Findings: No bruising or lesion.   Neurological:      General: No focal deficit present.      Mental Status: She is awake and alert     Motor: Weakness present.      Result Review    Result Review:  I have personally reviewed the results from the time of this admission to 2023 10:13 EDT and agree with these  findings:  [x]  Laboratory  []  Microbiology  [x]  Radiology  []  EKG/Telemetry   []  Cardiology/Vascular   []  Pathology  []  Old records  [x]  Other:  Most notable findings include: UTI  CTAP- bilateral non-obstructing renal stones complicated by bibasilar opacities that may represent atelectasis or infiltrated along with 5.5cm right ovarian cystic lesion.  Pelvic US shows cystic mass in the right ovary measuring up to 5.8cm with no evidence of right ovarian torsion.        Assessment & Plan      Brief Patient Summary:  Shannon Dyson is a 72 y.o. female with Hx HTN, Depression kidney stones who presents with Rt flank pain. No fever, chills   UA shows possible UTI.  CT a/p shows bilateral non-obstructing renal stones complicated by bibasilar opacities that may represent atelectasis or infiltrated along with 5.5cm right ovarian cystic lesion.  Pelvic US shows cystic mass in the right ovary measuring up to 5.8cm with no evidence of right ovarian torsion.  She is scheduled for lithotripsy next week. Awaiting gyne consult      ARIPiprazole, 10 mg, Oral, Daily  atenolol, 50 mg, Oral, Daily  buPROPion XL, 150 mg, Oral, Daily  [START ON 5/27/2023] cefTRIAXone, 1 g, Intravenous, Q24H  donepezil, 10 mg, Oral, Nightly  levothyroxine, 112 mcg, Oral, Q AM  senna-docusate sodium, 2 tablet, Oral, BID  sodium chloride, 10 mL, Intravenous, Q12H       lactated ringers, 75 mL/hr, Last Rate: 75 mL/hr (05/26/23 0351)         Active Hospital Problems:  Active Hospital Problems    Diagnosis    • **Right flank pain      Plan:   #Right Ovarian Cyst  #Bilateral non-obstructing renal stone    - CT a/p shows bilateral non-obstructing renal stones complicated by bibasilar opacities that may represent atelectasis or infiltrated along with 5.5cm right ovarian cystic lesion.      -Pelvic US shows cystic mass in the right ovary measuring up to 5.8cm with no evidence of right ovarian torsion.    - Awaiting gynecology eval    - As kidney  stones non-obstructing, will refrain from urology consult at this time, reportedly has otpt urology appointment for lithotripsy next week Wednesday    - Avoid opioids due to lethargy    - Toradol PRN pain    - Patient without clinical signs of pneumonia, CT chest from 2018 shows bibasilar atelectasis, suspect this is what showed up on CT a/p    - lipase 85, no evidence of pancreatic inflammation on CT     -resume diet     #UTI    - UA concerning for UTI, could also be inflammation related to kidney stones    - cont rocephin    - Urine culture     #Mild cognitive disorder    - follows with Dr. Seipel    - cont Aricept    - awake and alert    - patient's spouse is hospitalized, reportedly she is unable to care for self, may need placement    - pt/ot     #GERD    - PPI     #Anxiety  #Depression    - cont Abilify    - cont Wellbutrin and Lexapro    - cont Klonopin PRN     #Hypothyroid    - cont synthroid     #HTN    - BP stable hold home losartan    - cont atenolol     #HLD    - resume home Gemfibrozil if brings in, not on formular    DVT prophylaxis:  Mechanical DVT prophylaxis orders are present.    CODE STATUS:    Code Status (Patient has no pulse and is not breathing): CPR (Attempt to Resuscitate)  Medical Interventions (Patient has pulse or is breathing): Full Support      Disposition:  I expect patient to be discharged later today or in am      Electronically signed by Lucila Santana MD, 05/26/23, 10:13 EDT.  [facility] Hospitalist Team

## 2023-05-26 NOTE — THERAPY EVALUATION
Patient Name: Shannon Dyson  : 1950    MRN: [id number]                              Today's Date: 2023       Admit Date: 2023    Visit Dx:     ICD-10-CM ICD-9-CM   1. Right flank pain  R10.9 789.09   2. Ovarian mass, right  N83.8 620.8   3. Intractable pain  R52 780.96     Patient Active Problem List   Diagnosis   • Allergic rhinitis   • ALBA (generalized anxiety disorder)   • Bunion   • Carpal tunnel syndrome   • Degenerative joint disease   • Dependent edema   • Dermatitis   • Elevated antinuclear antibody (COMPA) level   • Encounter for immunization   • Encounter for screening for osteoporosis   • Fibrocystic breast disease   • Gastroesophageal reflux disease   • Hyperlipidemia   • Hypertension   • Hypokalemia   • Hypovitaminosis D   • Hypothyroidism   • Low back pain   • Myalgia   • Neurodermatitis   • Osteoporosis   • Other long term (current) drug therapy   • Mouth pain   • Elevated serum creatinine   • Mild cognitive disorder   • Loss of appetite   • Pain in gums   • Itching   • Panic disorder with agoraphobia   • Moderate episode of recurrent major depressive disorder   • Pharyngitis   • Sore throat   • Cough   • Nail fungus   • Bilateral posterior capsular opacification   • Corneal endothelial dystrophy   • Dry eyes   • Right flank pain     Past Medical History:   Diagnosis Date   • Allergic As a teenager   • Anxiety    • Arthritis ??   • Breast cyst    • Cataract About 10 years ago   • Depression Dont know   • Fibrocystic breast    • GERD (gastroesophageal reflux disease)    • HL (hearing loss) ??   • Hyperlipidemia    • Hypertension    • Hypothyroidism    • Panic disorder      Past Surgical History:   Procedure Laterality Date   • APPENDECTOMY     • BREAST BIOPSY     • BREAST SURGERY     • COLONOSCOPY  3 years ago   • HYSTERECTOMY        General Information     Row Name 23 0936          OT Time and Intention    Document Type evaluation  -MK     Mode of Treatment occupational  therapy  -     Row Name 05/26/23 0936          General Information    Prior Level of Function independent:;ADL's;home management;all household mobility;community mobility;driving  -     Existing Precautions/Restrictions fall  -     Barriers to Rehab none identified  -     Row Name 05/26/23 0936          Living Environment    People in Home spouse  -     Row Name 05/26/23 0936          Home Main Entrance    Number of Stairs, Main Entrance one  -     Stair Railings, Main Entrance none  -     Row Name 05/26/23 0936          Stairs Within Home, Primary    Number of Stairs, Within Home, Primary none  -[facility] Name 05/26/23 0936          Cognition    Orientation Status (Cognition) oriented x 3  -     Row Name 05/26/23 0936          Safety Issues, Functional Mobility    Impairments Affecting Function (Mobility) balance;endurance/activity tolerance;pain;strength  -           User Key  (r) = Recorded By, (t) = Taken By, (c) = Cosigned By    Initials Name Provider Type     Dylan Bazan OT Occupational Therapist                 Mobility/ADL's     Row Name 05/26/23 0943          Bed Mobility    Bed Mobility bed mobility (all) activities  -     All Activities, [place] (Bed Mobility) minimum assist (75% patient effort)  -     Row Name 05/26/23 0943          Transfers    Transfers toilet transfer;sit-stand transfer  -[facility] Name 05/26/23 0943          Sit-Stand Transfer    Sit-Stand [place] (Transfers) minimum assist (75% patient effort);contact guard  -     Assistive Device (Sit-Stand Transfers) walker, front-wheeled  -[facility] Name 05/26/23 0943          Toilet Transfer    Type (Toilet Transfer) sit-stand  -     [place] Level (Toilet Transfer) minimum assist (75% patient effort);contact guard  -     Assistive Device (Toilet Transfer) walker, front-wheeled  -     Row Name 05/26/23 0943          Activities of Daily Living    BADL Assessment/Intervention toileting  -      Row Name 05/26/23 0943          Toileting Assessment/Training    [place] Level (Toileting) toileting skills;minimum assist (75% patient effort);contact guard assist  -           User Key  (r) = Recorded By, (t) = Taken By, (c) = Cosigned By    Initials Name Provider Type    Dylan Castrejon OT Occupational Therapist               Obj/Interventions     [facility] Name 05/26/23 0953          Range of Motion Comprehensive    General Range of Motion bilateral upper extremity ROM WFL  -MK     Row Name 05/26/23 0953          Strength Comprehensive (MMT)    General Manual Muscle Testing (MMT) Assessment upper extremity strength deficits identified  -     Comment, General Manual Muscle Testing (MMT) Assessment BUE strength grossly 3/5  -           User Key  (r) = Recorded By, (t) = Taken By, (c) = Cosigned By    Initials Name Provider Type    Dylan Castrejon OT Occupational Therapist               Goals/Plan     Row Name 05/26/23 1001          Bathing Goal 1 (OT)    Activity/Device (Bathing Goal 1, OT) bathing skills, all  -     [place] Level/Cues Needed (Bathing Goal 1, OT) supervision required  -     Time Frame (Bathing Goal 1, OT) 2 weeks  -MK     Row Name 05/26/23 1001          Dressing Goal 1 (OT)    Activity/Device (Dressing Goal 1, OT) dressing skills, all  -     [place]/Cues Needed (Dressing Goal 1, OT) supervision required  -     Time Frame (Dressing Goal 1, OT) 2 weeks  -MK     Row Name 05/26/23 1001          Toileting Goal 1 (OT)    Activity/Device (Toileting Goal 1, OT) toileting skills, all  -     [place] Level/Cues Needed (Toileting Goal 1, OT) supervision required  -     Time Frame (Toileting Goal 1, OT) 2 weeks  -[facility] Name 05/26/23 1001          Therapy Assessment/Plan (OT)    Planned Therapy Interventions (OT) activity tolerance training;functional balance retraining;BADL retraining;neuromuscular control/coordination retraining;patient/caregiver  education/training;transfer/mobility retraining;strengthening exercise;ROM/therapeutic exercise  -           User Key  (r) = Recorded By, (t) = Taken By, (c) = Cosigned By    Initials Name Provider Type    Dylan Castrejon, DEMETRIS Occupational Therapist               Clinical Impression     Row Name 05/26/23 0953          Pain Assessment    Pretreatment Pain Rating 2/10  -     Posttreatment Pain Rating 2/10  -     Pain Location - Side/Orientation Right  -     Pain Location - flank  -     Pain Intervention(s) Repositioned;Therapeutic presence  -     Row Name 05/26/23 0953          Plan of Care Review    Plan of Care Reviewed With patient  -     Outcome Evaluation Pt is a 71 y/o F admitted for right flank pain.  Pt found to have R ovarian cyst, bilateral non obstruction renal stones, and UTI. PMHx: GERD, anxiety, depression, HTN, HLD, hypothyroidism.  Pt reports she lives in a one story home with one step to enter with .  Pt's  is in [facility] and just underwent open heart surgery.  Pt reports being independent with ADLs, IADLs, transfers, mobility, and driving.  Pt owns no AD.  Pt completed bed mobility with MIN A.  Pt t/f from bed <> BSC with MIN A-CGA.  Pt completed toileting with MIN A-CGA.  Pt completed functional mobility to chair using RW with CGA.  Pt presents with deficits in self care, transfers, functional mobility, strength, activity tolerance, and indicating need for skilled OT services.  OT recommending SNF as patient not safe to return home alone.  -     Row Name 05/26/23 0953          Therapy Assessment/Plan (OT)    Criteria for Skilled Therapeutic Interventions Met (OT) yes;meets criteria  -     Therapy Frequency (OT) 5 times/wk  -     Predicted Duration of Therapy Intervention (OT) until D/C  -     Row Name 05/26/23 0953          Therapy Plan Review/Discharge Plan (OT)    Anticipated Discharge Disposition (OT) skilled nursing facility  -     Row Name 05/26/23 0953           Positioning and Restraints    Pre-Treatment Position in bed  -MK     Post Treatment Position chair  -MK     In Chair notified nsg;sitting;call light within reach;encouraged to call for assist;exit alarm on  -MK           User Key  (r) = Recorded By, (t) = Taken By, (c) = Cosigned By    Initials Name Provider Type    Dylan Castrejon OT Occupational Therapist               Outcome Measures     Row Name 05/26/23 1003          How much help from another is currently needed...    Putting on and taking off regular lower body clothing? 2  -MK     Bathing (including washing, rinsing, and drying) 2  -MK     Toileting (which includes using toilet bed pan or urinal) 3  -MK     Putting on and taking off regular upper body clothing 3  -MK     Taking care of personal grooming (such as brushing teeth) 3  -MK     Eating meals 4  -MK     AM-PAC 6 Clicks Score (OT) 17  -MK     Row Name 05/26/23 0325          How much help from another person do you currently need...    Turning from your back to your side while in flat bed without using bedrails? 4  -TS     Moving from lying on back to sitting on the side of a flat bed without bedrails? 3  -TS     Moving to and from a bed to a chair (including a wheelchair)? 2  -TS     Standing up from a chair using your arms (e.g., wheelchair, bedside chair)? 3  -TS     Climbing 3-5 steps with a railing? 2  -TS     To walk in hospital room? 2  -TS     AM-PAC 6 Clicks Score (PT) 16  -TS     Row Name 05/26/23 1003          Functional Assessment    Outcome Measure Options AM-PAC 6 Clicks Daily Activity (OT)  -           User Key  (r) = Recorded By, (t) = Taken By, (c) = Cosigned By    Initials Name Provider Type    Agueda Gore, RN Registered Nurse    Dylan Castrejon OT Occupational Therapist                Occupational Therapy Education     Title: PT OT SLP Therapies (In Progress)     Topic: Occupational Therapy (In Progress)     Point: ADL training (Done)     Description:    Instruct learner(s) on proper safety adaptation and remediation techniques during self care or transfers.   Instruct in proper use of assistive devices.              Learning Progress Summary           Patient Acceptance, E, VU by DEB at 5/26/2023 1006                   Point: Home exercise program (Not Started)     Description:   Instruct learner(s) on appropriate technique for monitoring, assisting and/or progressing therapeutic exercises/activities.              Learner Progress:  Not documented in this visit.          Point: Precautions (Not Started)     Description:   Instruct learner(s) on prescribed precautions during self-care and functional transfers.              Learner Progress:  Not documented in this visit.          Point: Body mechanics (Not Started)     Description:   Instruct learner(s) on proper positioning and spine alignment during self-care, functional mobility activities and/or exercises.              Learner Progress:  Not documented in this visit.                      User Key     Initials Effective Dates Name Provider Type Discipline    DEB 02/17/22 -  Dylan Bazan OT Occupational Therapist OT              OT Recommendation and Plan  Planned Therapy Interventions (OT): activity tolerance training, functional balance retraining, BADL retraining, neuromuscular control/coordination retraining, patient/caregiver education/training, transfer/mobility retraining, strengthening exercise, ROM/therapeutic exercise  Therapy Frequency (OT): 5 times/wk  Plan of Care Review  Plan of Care Reviewed With: patient  Outcome Evaluation: Pt is a 73 y/o F admitted for right flank pain.  Pt found to have R ovarian cyst, bilateral non obstruction renal stones, and UTI. PMHx: GERD, anxiety, depression, HTN, HLD, hypothyroidism.  Pt reports she lives in a one story home with one step to enter with .  Pt's  is in [facility] and just underwent open heart surgery.  Pt reports being independent with ADLs,  IADLs, transfers, mobility, and driving.  Pt owns no AD.  Pt completed bed mobility with MIN A.  Pt t/f from bed <> BSC with MIN A-CGA.  Pt completed toileting with MIN A-CGA.  Pt completed functional mobility to chair using RW with CGA.  Pt presents with deficits in self care, transfers, functional mobility, strength, activity tolerance, and indicating need for skilled OT services.  OT recommending SNF as patient not safe to return home alone.     Time Calculation:    Time Calculation- OT     Row Name 05/26/23 [facility]             Time Calculation- OT    OT Start Time 0745  -      OT Stop Time 0807  -      OT Time Calculation (min) 22 min  -      Total Timed Code Minutes- OT 0 minute(s)  -      OT Received On 05/26/23  -      OT - Next Appointment 05/29/23  -      OT Goal Re-Cert Due Date 06/09/23  -            User Key  (r) = Recorded By, (t) = Taken By, (c) = Cosigned By    Initials Name Provider Type     Dylan Bazan OT Occupational Therapist              Therapy Charges for Today     Code Description Service Date Service Provider Modifiers Qty    [id number]  OT EVAL MOD COMPLEXITY 4 5/26/2023 Dylan Bazan OT GO 1               Dylan Bazan OT  5/26/2023

## 2023-05-26 NOTE — THERAPY EVALUATION
Patient Name: Shannon Dyson  : 1950    MRN: [id number]                              Today's Date: 2023       Admit Date: 2023    Visit Dx:     ICD-10-CM ICD-9-CM   1. Right flank pain  R10.9 789.09   2. Ovarian mass, right  N83.8 620.8   3. Intractable pain  R52 780.96     Patient Active Problem List   Diagnosis   • Allergic rhinitis   • ALBA (generalized anxiety disorder)   • Bunion   • Carpal tunnel syndrome   • Degenerative joint disease   • Dependent edema   • Dermatitis   • Elevated antinuclear antibody (COMPA) level   • Encounter for immunization   • Encounter for screening for osteoporosis   • Fibrocystic breast disease   • Gastroesophageal reflux disease   • Hyperlipidemia   • Hypertension   • Hypokalemia   • Hypovitaminosis D   • Hypothyroidism   • Low back pain   • Myalgia   • Neurodermatitis   • Osteoporosis   • Other long term (current) drug therapy   • Mouth pain   • Elevated serum creatinine   • Mild cognitive disorder   • Loss of appetite   • Pain in gums   • Itching   • Panic disorder with agoraphobia   • Moderate episode of recurrent major depressive disorder   • Pharyngitis   • Sore throat   • Cough   • Nail fungus   • Bilateral posterior capsular opacification   • Corneal endothelial dystrophy   • Dry eyes   • Right flank pain     Past Medical History:   Diagnosis Date   • Allergic As a teenager   • Anxiety    • Arthritis ??   • Breast cyst    • Cataract About 10 years ago   • Depression Dont know   • Fibrocystic breast    • GERD (gastroesophageal reflux disease)    • HL (hearing loss) ??   • Hyperlipidemia    • Hypertension    • Hypothyroidism    • Panic disorder      Past Surgical History:   Procedure Laterality Date   • APPENDECTOMY     • BREAST BIOPSY     • BREAST SURGERY     • COLONOSCOPY  3 years ago   • HYSTERECTOMY        General Information     Row Name 23 1329          Physical Therapy Time and Intention    Document Type evaluation  -EJ     Mode of Treatment  physical therapy  -EJ     Row Name 05/26/23 1329          General Information    Patient Profile Reviewed yes  -EJ     Prior Level of Function independent:;ADL's;all household mobility;community mobility;driving;home management  -EJ     Existing Precautions/Restrictions fall;orthostatic hypotension  -EJ     Barriers to Rehab none identified  -EJ     Row Name 05/26/23 1329          Living Environment    People in Home spouse  -EJ     Name(s) of People in Home Chris (); however, he is currently at hospital also due to heart issues.  -EJ     Row Name 05/26/23 1329          Home Main Entrance    Number of Stairs, Main Entrance one  -EJ     Stair Railings, Main Entrance none  -EJ     Row Name 05/26/23 1329          Stairs Within Home, Primary    Number of Stairs, Within Home, Primary none  -EJ     Row Name 05/26/23 1329          Cognition    Orientation Status (Cognition) oriented x 3  -EJ     Row Name 05/26/23 1329          Safety Issues, Functional Mobility    Impairments Affecting Function (Mobility) balance;endurance/activity tolerance;pain;strength  -EJ           User Key  (r) = Recorded By, (t) = Taken By, (c) = Cosigned By    Initials Name Provider Type    EJ Prachi Pino, PT Physical Therapist               Mobility     Row Name 05/26/23 1330          Bed Mobility    Bed Mobility bed mobility (all) activities;supine-sit;sit-supine;scooting/bridging  -EJ     Scooting/Bridging [place] (Bed Mobility) maximum assist (25% patient effort);2 person assist  -EJ     Supine-Sit [place] (Bed Mobility) minimum assist (75% patient effort)  -EJ     Sit-Supine [place] (Bed Mobility) minimum assist (75% patient effort)  -EJ     Assistive Device (Bed Mobility) bed rails;draw sheet  -EJ           User Key  (r) = Recorded By, (t) = Taken By, (c) = Cosigned By    Initials Name Provider Type    Prachi Santana, PT Physical Therapist               Obj/Interventions     Row Name 05/26/23 7520          Range  of Motion Comprehensive    General Range of Motion bilateral lower extremity ROM WFL  -EJ     Row Name 05/26/23 1330          Strength Comprehensive (MMT)    Comment, General Manual Muscle Testing (MMT) Assessment BLE strength grossly 4+/5 with exception of R hip flex and abd 3-/5 (painful).  -EJ     Row Name 05/26/23 1330          Balance    Balance Assessment sitting static balance;sitting dynamic balance  -EJ     Static Sitting Balance modified independence  -EJ     Dynamic Sitting Balance standby assist  -EJ     Position, Sitting Balance sitting edge of bed  -EJ     Balance Interventions sitting;static;dynamic;minimal challenge  -EJ     Row Name 05/26/23 1330          Sensory Assessment (Somatosensory)    Sensory Assessment (Somatosensory) other (see comments)  Pt reported diminished sensation during dermatomal testing (light touch) L4 dermatomal distribution RLE.  -EJ           User Key  (r) = Recorded By, (t) = Taken By, (c) = Cosigned By    Initials Name Provider Type    EJ Prachi Pino, PT Physical Therapist               Goals/Plan     Row Name 05/26/23 1337          Bed Mobility Goal 1 (PT)    Activity/Assistive Device (Bed Mobility Goal 1, PT) bed mobility activities, all  -EJ     [place] Level/Cues Needed (Bed Mobility Goal 1, PT) modified independence  -EJ     Time Frame (Bed Mobility Goal 1, PT) long term goal (LTG);2 weeks  -     Row Name 05/26/23 1337          Transfer Goal 1 (PT)    Activity/Assistive Device (Transfer Goal 1, PT) transfers, all;walker, rolling  -EJ     [place] Level/Cues Needed (Transfer Goal 1, PT) supervision required  -EJ     Time Frame (Transfer Goal 1, PT) long term goal (LTG);2 weeks  -     Row Name 05/26/23 1337          Gait Training Goal 1 (PT)    Activity/Assistive Device (Gait Training Goal 1, PT) gait (walking locomotion);walker, rolling  -EJ     [place] Level (Gait Training Goal 1, PT) standby assist  -EJ     Distance (Gait Training Goal 1, PT) 75   -EJ     Time Frame (Gait Training Goal 1, PT) long term goal (LTG);2 weeks  -EJ     Row Name 05/26/23 1226          Therapy Assessment/Plan (PT)    Planned Therapy Interventions (PT) balance training;bed mobility training;gait training;patient/family education;neuromuscular re-education;strengthening;transfer training  -EJ           User Key  (r) = Recorded By, (t) = Taken By, (c) = Cosigned By    Initials Name Provider Type    EJ Prachi Pino, PT Physical Therapist               Clinical Impression     Row Name 05/26/23 1070          Pain    Pretreatment Pain Rating 2/10  -EJ     Posttreatment Pain Rating 6/10  -EJ     Pain Location - Side/Orientation Right  -EJ     Pain Location - back  -EJ     Pain Intervention(s) Repositioned;Therapeutic presence  -EJ     Row Name 05/26/23 3651          Plan of Care Review    Plan of Care Reviewed With patient  -EJ     Outcome Evaluation Patient is a 71 y/o F who was admitted 5/25/23 with c/o R flank pain. Pt found to have R ovarian cyst, bilateral non obstruction renal stones, and UTI. PMHx: GERD, anxiety, depression, HTN, HLD, hypothyroidism. Pt is scheduled to have lithotripsy procedure next Wed on outpatient basis.  Pt reports she lives in a one story home with one step to enter with . Pt's  is in [facility] and just underwent open heart surgery. Pt reports being independent with ADLs, IADLs, transfers, mobility, and driving. Pt owns no AD. She reports she is having R sided LBP that also refers down her R leg to her calf.  Has been sleeping in recliner in hospital with spouse as he is currently in hospital. Pt completed bed mobility with min A x1 for supine to sit and sit to supine.  Required max A x2 to be scooted up in bed this date due to weakness and pain in R sided low back.  Once sitting EOB pt c/o dizziness and headache.  BP taken reading 99/61.  Pt also had c/o getting too hot.  Pt was assisted back to supine and assisted up in bed.  Nursing notified of  pt's symptoms.  Pt unable to assist with scooting up in bed due to weakness and low back pain.  BP taken 2 more times (supine position) readin/57 and ~5 minutes later reading 102/50.  Pt presents with deficits in functional mobility, strength, activity tolerance, and slight cognitive deficits present. At this time PT recommends SNF pending her progress in acute setting.  If pt's mobility/symptoms/cognitiion improves, then she could hopefully d/c home at that time with home health.  In current state, based on findings today, SNF recommended.  -EJ     Row Name 23 1332          Therapy Assessment/Plan (PT)    Criteria for Skilled Interventions Met (PT) yes;skilled treatment is necessary  -EJ     Therapy Frequency (PT) 5 times/wk  -EJ     Predicted Duration of Therapy Intervention (PT) until discharge  -EJ     Row Name 23 1332          Positioning and Restraints    Pre-Treatment Position in bed  -EJ     Post Treatment Position bed  -EJ     In Bed notified nsg;exit alarm on;patient within staff view;with family/caregiver;with nsg;encouraged to call for assist  -EJ           User Key  (r) = Recorded By, (t) = Taken By, (c) = Cosigned By    Initials Name Provider Type    Prachi Santana, PT Physical Therapist               Outcome Measures     Row Name 23 1338 23 0800       How much help from another person do you currently need...    Turning from your back to your side while in flat bed without using bedrails? 3  -EJ 3  -EH    Moving from lying on back to sitting on the side of a flat bed without bedrails? 3  -EJ 3  -EH    Moving to and from a bed to a chair (including a wheelchair)? 3  -EJ 3  -EH    Standing up from a chair using your arms (e.g., wheelchair, bedside chair)? 3  -EJ 3  -EH    Climbing 3-5 steps with a railing? 2  -EJ 3  -EH    To walk in hospital room? 2  -EJ 3  -EH    AM-PAC 6 Clicks Score (PT) 16  -EJ 18  -EH    Highest level of mobility 5 --> Static standing  -EJ 6 -->  Walked 10 steps or more  -    Row Name 05/26/23 0325          How much help from another person do you currently need...    Turning from your back to your side while in flat bed without using bedrails? 4  -TS     Moving from lying on back to sitting on the side of a flat bed without bedrails? 3  -TS     Moving to and from a bed to a chair (including a wheelchair)? 2  -TS     Standing up from a chair using your arms (e.g., wheelchair, bedside chair)? 3  -TS     Climbing 3-5 steps with a railing? 2  -TS     To walk in hospital room? 2  -TS     AM-PAC 6 Clicks Score (PT) 16  -TS     Highest level of mobility 5 --> Static standing  -     Row Name 05/26/23 1338 05/26/23 1003       Functional Assessment    Outcome Measure Options AM-PAC 6 Clicks Basic Mobility (PT)  - AM-PAC 6 Clicks Daily Activity (OT)  -DEB          User Key  (r) = Recorded By, (t) = Taken By, (c) = Cosigned By    Initials Name Provider Type    EJ Prachi Pino, PT Physical Therapist     Prachi Cervantes, RN Registered Nurse    Agueda Gore RN Registered Nurse    Dylan Castrejon OT Occupational Therapist                             Physical Therapy Education     Title: PT OT SLP Therapies (In Progress)     Topic: Physical Therapy (Done)     Point: Mobility training (Done)     Learning Progress Summary           Patient Acceptance, E, VU,NR by  at 5/26/2023 1339                   Point: Body mechanics (Done)     Learning Progress Summary           Patient Acceptance, E, VU,NR by  at 5/26/2023 1339                   Point: Precautions (Done)     Learning Progress Summary           Patient Acceptance, E, VU,NR by  at 5/26/2023 1339                               User Key     Initials Effective Dates Name Provider Type [facility] 06/16/21 -  Prachi Pino, PT Physical Therapist PT              PT Recommendation and Plan  Planned Therapy Interventions (PT): balance training, bed mobility training, gait training,  patient/family education, neuromuscular re-education, strengthening, transfer training  Plan of Care Reviewed With: patient  Outcome Evaluation: Patient is a 71 y/o F who was admitted 23 with c/o R flank pain. Pt found to have R ovarian cyst, bilateral non obstruction renal stones, and UTI. PMHx: GERD, anxiety, depression, HTN, HLD, hypothyroidism. Pt is scheduled to have lithotripsy procedure next Wed on outpatient basis.  Pt reports she lives in a one story home with one step to enter with . Pt's  is in [facility] and just underwent open heart surgery. Pt reports being independent with ADLs, IADLs, transfers, mobility, and driving. Pt owns no AD. She reports she is having R sided LBP that also refers down her R leg to her calf.  Has been sleeping in recliner in hospital with spouse as he is currently in hospital. Pt completed bed mobility with min A x1 for supine to sit and sit to supine.  Required max A x2 to be scooted up in bed this date due to weakness and pain in R sided low back.  Once sitting EOB pt c/o dizziness and headache.  BP taken reading 99/61.  Pt also had c/o getting too hot.  Pt was assisted back to supine and assisted up in bed.  Nursing notified of pt's symptoms.  Pt unable to assist with scooting up in bed due to weakness and low back pain.  BP taken 2 more times (supine position) readin/57 and ~5 minutes later reading 102/50.  Pt presents with deficits in functional mobility, strength, activity tolerance, and slight cognitive deficits present. At this time PT recommends SNF pending her progress in acute setting.  If pt's mobility/symptoms/cognitiion improves, then she could hopefully d/c home at that time with home health.  In current state, based on findings today, SNF recommended.     Time Calculation:    PT Charges     Row Name 23 1341             Time Calculation    Start Time 1010  -EJ      Stop Time 1036  -EJ      Time Calculation (min) 26 min  -EJ      PT  Received On 05/26/23  -REECE      PT - Next Appointment 05/28/23  -EJ      PT Goal Re-Cert Due Date 06/09/23  -         Time Calculation- PT    Total Timed Code Minutes- PT 0 minute(s)  -EJ            User Key  (r) = Recorded By, (t) = Taken By, (c) = Cosigned By    Initials Name Provider Type     Prachi Pino, PT Physical Therapist              Therapy Charges for Today     Code Description Service Date Service Provider Modifiers Qty    [id number] HC PT EVAL MOD COMPLEXITY 4 5/26/2023 Prachi Pino, PT GP 1          PT G-Codes  Outcome Measure Options: AM-PAC 6 Clicks Basic Mobility (PT)  AM-PAC 6 Clicks Score (PT): 16  AM-PAC 6 Clicks Score (OT): 17  PT Discharge Summary  Anticipated Discharge Disposition (PT): skilled nursing facility    Prachi Pino, PT  5/26/2023

## 2023-05-26 NOTE — CONSULTS
Referring Provider: Dr. Brito  Reason for Consultation: Ovarian cyst    Patient Care Team:  Ana Paula Braun APRN as PCP - General  Seipel, Joseph F, MD as Consulting Physician (Neurology)  Mamadou Goss PA-C as Physician Assistant (Behavioral Health)  [facility] as Nurse Practitioner (Rheumatology)  Leonila De La Rosa OD (Optometry)  Dalton House MD as Consulting Physician (Rheumatology)    Chief complaint right flank, back pain radiating down leg    Subjective .     History of present illness: 72-year-old female recently staying with her  in the hospital for 12 days presented complaining of acute onset of right side, back and leg pain.  She states the pain is unbearable.  It started acutely yesterday.  Her  has been in the hospital for heart surgery and has been there for 12 days.  She stayed with him the entire time by sleeping on the pullout bed in the room.  She has a history of kidney stones.  She had a CT scan at [facility] within the last couple months.  She was told that she had an ovarian cyst on that scan as well.  She did not have this pain at that time.  She was encouraged to follow-up with a gynecologist.      I been consulted because patient was found to have a 5.8 right ovarian cyst on a CT scan here and there is concern that this may be causing her right back, right flank and radiating leg pain.  Patient states that when she has the pain is a 10 out of 10.  She had pain medicine and its not bad right now.  She was recently repositioned and had a pain pill.    Patient denies nausea, vomiting, diarrhea, constipation, fever, chills.  Her last GYN examination was a long time ago.    Objective     Vital Signs   Vitals:    05/26/23 0319 05/26/23 0533 05/26/23 1200 05/26/23 1311   BP: 129/62  101/53 90/48   BP Location: Right arm   Right arm   Patient Position: Lying   Lying   Pulse: 80  64 60   Resp: 18   10   Temp: 98 °F (36.7 °C)      TempSrc: Oral  "     SpO2:   94% 94%   Weight: 73.2 kg (161 lb 6 oz) 73 kg (160 lb 15 oz)     Height: 162.6 cm (64\")        Temp (24hrs), Av.6 °F (37 °C), Min:98 °F (36.7 °C), Max:99.1 °F (37.3 °C)      Physical Exam:     General Appearance:    Alert, cooperative, in no acute distress   Head:    Normocephalic, without obvious abnormality, atraumatic       Back:    Tender to palpation over the right SI joint and the right hip.   Abdomen:     Soft, non-tender, non-distended, no guarding, no rebound          tenderness   Genitalia:   Deferred for exam in the office                   Lab Results:  Lab Results (last 48 hours)     Procedure Component Value Units Date/Time    Blood Culture - Blood, Hand, Left [[id number]] Collected: 23 1113    Specimen: Blood from Hand, Left Updated: 23 1156    Urine Culture - Urine, Urine, Clean Catch [[id number]]  (Normal) Collected: 23 225    Specimen: Urine, Clean Catch Updated: 23 112     Urine Culture Culture in progress    Basic Metabolic Panel [[id number]]  (Abnormal) Collected: 23    Specimen: Blood Updated: 23 1051     Glucose 119 mg/dL      BUN 16 mg/dL      Creatinine 0.63 mg/dL      Sodium 138 mmol/L      Potassium 4.1 mmol/L      Chloride 102 mmol/L      CO2 25.0 mmol/L      Calcium 9.6 mg/dL      BUN/Creatinine Ratio 25.4     Anion Gap 11.0 mmol/L      eGFR 93.2 mL/min/1.73     Narrative:      GFR Normal >60  Chronic Kidney Disease <60  Kidney Failure <15    The GFR formula is only valid for adults with stable renal function between ages 18 and 70.    Lipase [[id number]]  (Normal) Collected: 23    Specimen: Blood Updated: 23 1051     Lipase 36 U/L     CBC (No Diff) [[id number]]  (Normal) Collected: 23    Specimen: Blood Updated: 23 1032     WBC 9.40 10*3/mm3      RBC 3.92 10*6/mm3      Hemoglobin 12.1 g/dL      Hematocrit 36.5 %      MCV 92.9 fL      MCH 30.9 pg      MCHC 33.2 g/dL      RDW 14.1 %      RDW-SD 46.4 " fl      MPV 8.3 fL      Platelets 261 10*3/mm3     Blood Culture - Blood, Arm, Right [[id number]] Collected: 05/26/23 0904    Specimen: Blood from Arm, Right Updated: 05/26/23 1026    TSH [[id number]]  (Normal) Collected: 05/25/23 2003    Specimen: Blood Updated: 05/26/23 0416     TSH 1.500 uIU/mL     Urinalysis With Microscopic If Indicated (No Culture) - Urine, Clean Catch [[id number]]  (Abnormal) Collected: 05/25/23 2252    Specimen: Urine, Clean Catch Updated: 05/25/23 2305     Color, UA Yellow     Appearance, UA Clear     pH, UA 6.5     Specific Gravity, UA 1.020     Glucose, UA Negative     Ketones, UA Negative     Bilirubin, UA Negative     Blood, UA Small (1+)     Protein, UA Negative     Leuk Esterase, UA Moderate (2+)     Nitrite, UA Negative     Urobilinogen, UA 1.0 E.U./dL    Urinalysis, Microscopic Only - Urine, Clean Catch [[id number]]  (Abnormal) Collected: 05/25/23 2252    Specimen: Urine, Clean Catch Updated: 05/25/23 2305     RBC, UA 13-20 /HPF      WBC, UA 31-50 /HPF      Bacteria, UA None Seen /HPF      Squamous Epithelial Cells, UA 0-2 /HPF      Hyaline Casts, UA None Seen /LPF      Methodology Automated Microscopy    Comprehensive Metabolic Panel [[id number]]  (Abnormal) Collected: 05/25/23 2003    Specimen: Blood Updated: 05/25/23 2114     Glucose 111 mg/dL      BUN 21 mg/dL      Creatinine 0.65 mg/dL      Sodium 143 mmol/L      Potassium 4.1 mmol/L      Chloride 105 mmol/L      CO2 26.0 mmol/L      Calcium 9.9 mg/dL      Total Protein 7.0 g/dL      Albumin 4.2 g/dL      ALT (SGPT) 9 U/L      AST (SGOT) 17 U/L      Alkaline Phosphatase 144 U/L      Total Bilirubin 0.2 mg/dL      Globulin 2.8 gm/dL      A/G Ratio 1.5 g/dL      BUN/Creatinine Ratio 32.3     Anion Gap 12.0 mmol/L      eGFR 92.5 mL/min/1.73     Narrative:      GFR Normal >60  Chronic Kidney Disease <60  Kidney Failure <15    The GFR formula is only valid for adults with stable renal function between ages 18 and 70.    Lipase  [[id number]]  (Abnormal) Collected: 05/25/23 2003    Specimen: Blood Updated: 05/25/23 2114     Lipase 83 U/L     CBC & Differential [[id number]]  (Abnormal) Collected: 05/25/23 2003    Specimen: Blood Updated: 05/25/23 2020    Narrative:      The following orders were created for panel order CBC & Differential.  Procedure                               Abnormality         Status                     ---------                               -----------         ------                     CBC Auto Differential[[id number]]        Abnormal            Final result                 Please view results for these tests on the individual orders.    CBC Auto Differential [[id number]]  (Abnormal) Collected: 05/25/23 2003    Specimen: Blood Updated: 05/25/23 2020     WBC 7.50 10*3/mm3      RBC 3.99 10*6/mm3      Hemoglobin 12.4 g/dL      Hematocrit 37.0 %      MCV 92.7 fL      MCH 31.0 pg      MCHC 33.4 g/dL      RDW 14.1 %      RDW-SD 45.5 fl      MPV 8.2 fL      Platelets 272 10*3/mm3      Neutrophil % 71.4 %      Lymphocyte % 18.0 %      Monocyte % 6.5 %      Eosinophil % 3.7 %      Basophil % 0.4 %      Neutrophils, Absolute 5.40 10*3/mm3      Lymphocytes, Absolute 1.40 10*3/mm3      Monocytes, Absolute 0.50 10*3/mm3      Eosinophils, Absolute 0.30 10*3/mm3      Basophils, Absolute 0.00 10*3/mm3      nRBC 0.0 /100 WBC           Radiology Results:  Imaging Results (Last 72 Hours)     Procedure Component Value Units Date/Time    US Pelvis Complete [[id number]] Collected: 05/25/23 2227     Updated: 05/26/23 0030    Narrative:      Exam: Pelvic ultrasound    Date: May 25, 2023    History: Right ovarian cyst    Comparison: CT same day    Technique: Real-time Duplex scan was performed using B-mode/grayscale imaging, color flow and spectral Doppler analysis to include evaluation of arterial and venous flow. Transabdominal technique was utilized.    Findings:    The uterus is not identified consistent with the patient's surgical  history.    The right ovary measures 6.6 x 4.8 x 6.8 cm. There is normal arterial flow to the right ovary. There is also normal venous flow. There is a cystic mass in the right ovary measuring up to 5.8 cm.    The left ovary is not well-visualized secondary to overlying bowel gas.    There is no significant free fluid in the pelvis.      Impression:      1. Cystic mass in the right ovary measuring up to 5.8 cm. This is considered abnormal in a patient of this age. OB/GYN consultation is recommended for follow-up. If this is not performed, follow-up is recommended per guidelines below.  2. No evidence of right ovarian torsion.      Management of Ovarian Cyst:   Simple Cyst (cystic, no solid component, thin walls)   Low Risk (Pre-menopausal)   * < 5 cm ? Done, no follow up   * 5-7 cm ? Yearly u/s follow up   * > 7cm ? MRI or surgery   High Risk (post-menopausal, personal of family hx, genetic risk)   * < 2cm - Done, no follow up   * 2-7cm ? Yearly follow up   * > 7cm - MRI or surgery   Hemorrhagic Cyst (cystic   Low Risk (Pre-menopausal)   * < 5 cm ? Done , no f/u needed   * > 5 cm ? 6-12 week u/s follow up:   - Resolved ? done   - Unchanged - MRI   High Risk (post-menopausal, personal of family hx, genetic risk)   * < 5cm   * Early Menopause-6-12 week f/u u/s   - Resolved ? done   - Unchanged ? MRI   * Late Menopause   - MRI or surgery   * > 5 cm - MRI or Surgery   Any Other Cyst   Low Risk (Pre-menopausal)   * Thin septation/small calcification ? 6-12 f/u ultrasound   - Resolved ? done   - Unchanged ? MRI   * Multiple thin/thick septations, solid component, ascites, LA   - Surgery   High Risk (post-menopausal, personal of family hx, genetic risk)   * Thin septation/small calcification   - MRI vs surgery   * Multiple/thick septations, solid w/ flow, ascites, LA   - Surgery   Management of Asymptomatic Ovarian and Other Adnexal Cysts Imaged at US; Society of Radiologists in Ultrasound Consensus Conference Statement;  Ultrasound Quarterly                     Electronically signed by:  Gregorio Tipton M.D.    5/25/2023 10:29 PM Mountain Time    CT Abdomen Pelvis Without Contrast [[id number]] Collected: 05/25/23 2231     Updated: 05/25/23 2238    Narrative:      CT ABDOMEN PELVIS WO CONTRAST    Date of Exam: 5/25/2023 10:09 PM EDT    Indication: Flank pain, kidney stone suspected.    Comparison: None available.    Technique: Axial CT images were obtained of the abdomen and pelvis without the administration of contrast. Sagittal and coronal reconstructions were performed.  Automated exposure control and iterative reconstruction methods were used.      Findings:  *Lower Thorax: Bibasilar opacities.  *Liver: Unremarkable.  *Gallbladder: Normal gallbladder.  *Pancreas: Normal.  *Spleen: Normal.  *Adrenal Glands: Normal.  *Kidneys: Bilateral nonobstructing renal stones largest measuring 1.3 cm on left and 9 mm on right. No hydronephrosis bilaterally.  *Stomach:Unremarkable.  *Bowel: Nonobstructive bowel gas pattern. No bowel wall inflammation.  *Appendix: Appendix is not visualized. No secondary signs of appendicitis.  *Peritoneal Cavity: No pneumoperitoneum.  No lymphadenopathy.   *Urinary Bladder: Unremarkable.  *Pelvis: No free pelvic fluid. 5.5 cm right ovarian cystic lesion.  *Bones: No acute fractures or dislocations. No osseous destructive lesions. Multilevel spondylosis. Generalized osteopenia.  *      Impression:      1.Bilateral nonobstructing renal stones.  2.Bibasilar opacities may represent atelectasis or infiltrates.  3.5.5 cm right ovarian cystic lesion.  4.Diverticulosis without diverticulitis.      Case discussed with clinician LUCY ARGUELLES @ 5/25/2023 10:35 PM EDT.              Electronically Signed: Clint Woodard    5/25/2023 10:35 PM EDT    Workstation ID: [id number]          Assessment & Plan     Principal Problem:    Right flank pain      1.  Right ovarian cyst-this has been present for over a month.  Would recommend  getting CT scan from [facility].  Doubt that her right back pain, flank pain and radiating leg pain are related to this ovarian cyst as its been present for a while.  We will have patient follow-up with me in the office in a month.  We will repeat her ultrasound then to see if this is growing.  Would recommend a  to rule out ovarian cancer.    Right back, flank and radiating leg pain.  Patient has recently been sleeping on an unfamiliar bed.  This to me represents either musculoskeletal or neurological etiology pain.  Would recommend considering spine CT/MRI.  Consider alternating heat and cold/ice packs.  Consider physical therapy.  Consider muscle relaxer.    Thank you very much for the consult on this very pleasant patient.     Please reconsult if any other gynecologic problems arise      I discussed the patients findings and my recommendations with patient    Leonila Waite MD  05/26/23  13:18 EDT

## 2023-05-26 NOTE — DISCHARGE PLACEMENT REQUEST
"Neida Dyson (72 y.o. Female)     Date of Birth   1950    Social Security Number       Address   [address] [place] [address] [address] [facility]    Home Phone   [phone]    MRN   [id number]       Buddhism   Quaker    Marital Status                               Admission Date   23    Admission Type   Emergency    Admitting Provider   Jacky Brito DO    Attending Provider   Lucila Santana MD    Department, Room/Bed   [facility] 2C MEDICAL INPATIENT, 246/       Discharge Date       Discharge Disposition       Discharge Destination                               Attending Provider: Lucila Santana MD    Allergies: Codeine, Penicillin G, Amlodipine    Isolation: None   Infection: None   Code Status: CPR    Ht: 162.6 cm (64\")   Wt: 73 kg (160 lb 15 oz)    Admission Cmt: None   Principal Problem: Right flank pain [R10.9]                 Active Insurance as of 2023     Primary Coverage     Payor Plan Insurance Group Employer/Plan Group    HUMANA MEDICARE REPLACEMENT HUMANA MEDICARE REPLACEMENT [id number]     Payor Plan Address Payor Plan Phone Number Payor Plan Fax Number Effective Dates    [address] [address] [phone]  2023 - None Entered    [facility] [id number]       Subscriber Name Subscriber Birth Date Member ID       NEIDA DYSON 1950 [id number]                 Emergency Contacts      (Rel.) Home Phone Work Phone Mobile Phone    irasema dyson (Spouse) -- -- [phone]    Leonila Padilla (Daughter) -- -- [phone]    Janelle Champion (Grandchild) -- -- [phone]               History & Physical      Jacky Brito DO at 23 0256              [facility] Medicine Services      Patient Name: Neida Dyson  : 1950  MRN: [id number]  Primary Care Physician:  Ana Paula Braun APRN  Date of admission: 2023      Subjective       Chief Complaint: right flank pain    History of Present Illness: Neida Dyson " is a 72 y.o. female who presented to [facility] on 5/25/2023 complaining of right flank pain.  Of note, due to patient condition, hpi obtained from available records and discussion with ER staff.  Reports sharp right sided flank pain that started around 2 or 3pm today that radiates down leg and is constant.   is in hospital and she is reportedly having issues taking care of herself.  Denies chest pain, dyspnea, fevers, chills, nausea, vomiting.  Presented to [facility] for evaluation.    In the ER, vitals 99.1F, HR 80, RR 18, /81, 96% 3L NC.  Labs notable for glucose 111, lipase 83.  UA shows possible UTI.  CT a/p shows bilateral non-obstructing renal stones complicated by bibasilar opacities that may represent atelectasis or infiltrated along with 5.5cm right ovarian cystic lesion.  Pelvic US shows cystic mass in the right ovary measuring up to 5.8cm with no evidence of right ovarian torsion.      ROS   Unable to obtain due to lethargy    Personal History     Past Medical History:   Diagnosis Date   • Allergic As a teenager   • Anxiety    • Arthritis ??   • Breast cyst    • Cataract About 10 years ago   • Depression Dont know   • Fibrocystic breast    • GERD (gastroesophageal reflux disease)    • HL (hearing loss) ??   • Hyperlipidemia    • Hypertension    • Hypothyroidism    • Panic disorder        Past Surgical History:   Procedure Laterality Date   • APPENDECTOMY     • BREAST BIOPSY     • BREAST SURGERY     • COLONOSCOPY  3 years ago   • HYSTERECTOMY         Family History: family history includes Arthritis in her father and mother; Heart disease in her father and mother; Hyperlipidemia in her mother. Otherwise pertinent FHx was reviewed and not pertinent to current issue.    Social History:  reports that she has never smoked. She has never used smokeless tobacco. She reports that she does not drink alcohol and does not use drugs.    Home Medications:  Prior to Admission Medications      Prescriptions Last Dose Informant Patient Reported? Taking?    gemfibrozil (LOPID) 600 MG tablet   No No    TAKE 1 TABLET TWICE DAILY BEFORE MEALS    ARIPiprazole (ABILIFY) 10 MG tablet   No No    Take 1 tablet by mouth Daily.    atenolol (TENORMIN) 50 MG tablet   No No    TAKE 1 TABLET EVERY DAY    buPROPion XL (WELLBUTRIN XL) 150 MG 24 hr tablet   No No    TAKE 1 TABLET EVERY MORNING    celecoxib (CeleBREX) 100 MG capsule   Yes No    Take 100 mg by mouth 2 (Two) Times a Day As Needed.    Cholecalciferol (Vitamin D3) 50 MCG (2000 UT) tablet   No No    Take 1 tablet by mouth Daily.    clonazePAM (KlonoPIN) 0.5 MG tablet   No No    Take 1 tablet by mouth 2 (Two) Times a Day.    dicyclomine (Bentyl) 10 MG capsule   No No    Take 1 capsule by mouth 4 (Four) Times a Day Before Meals & at Bedtime.    donepezil (ARICEPT) 10 MG tablet   No No    TAKE 1 TABLET BY MOUTH EVERY NIGHT.    doxepin (SINEquan) 100 MG capsule   No No    TAKE 1 CAPSULE BY MOUTH EVERY NIGHT.    escitalopram (Lexapro) 20 MG tablet   No No    Take 1 tablet by mouth Daily.    levothyroxine (SYNTHROID, LEVOTHROID) 112 MCG tablet   No No    TAKE 1 TABLET EVERY DAY    losartan (COZAAR) 100 MG tablet   No No    TAKE 1 TABLET EVERY DAY            Allergies:  Allergies   Allergen Reactions   • Codeine Nausea And Vomiting   • Penicillin G Unknown (See Comments)   • Amlodipine Swelling       Objective       Vitals:   Temp:  [99.1 °F (37.3 °C)] 99.1 °F (37.3 °C)  Heart Rate:  [68-84] 80  Resp:  [16-18] 18  BP: (107-146)/(62-81) 114/81  Flow (L/min):  [2-3] 3    Physical Exam  Constitutional:       Appearance: She is obese.      Comments: AAOx1-2, lethargic, waxing and waning   HENT:      Head: Normocephalic and atraumatic.      Nose: Nose normal. No congestion.      Mouth/Throat:      Pharynx: Oropharynx is clear. No oropharyngeal exudate.   Eyes:      General: No scleral icterus.  Cardiovascular:      Rate and Rhythm: Normal rate and regular rhythm.      Heart  sounds: No murmur heard.    No friction rub. No gallop.   Pulmonary:      Effort: No respiratory distress.      Breath sounds: No wheezing or rales.   Abdominal:      General: There is no distension.      Tenderness: There is abdominal tenderness. There is no guarding.   Musculoskeletal:         General: No swelling or deformity.      Cervical back: Normal range of motion. No rigidity.      Right lower leg: No edema.      Left lower leg: No edema.   Skin:     Coloration: Skin is not jaundiced.      Findings: No bruising or lesion.   Neurological:      General: No focal deficit present.      Mental Status: She is disoriented.      Motor: Weakness present.          Result Review    Result Review:  I have personally reviewed the results from the time of this admission to 5/26/2023 02:56 EDT and agree with these findings:  [x]  Laboratory  []  Microbiology  [x]  Radiology  []  EKG/Telemetry   []  Cardiology/Vascular   []  Pathology  []  Old records  []  Other:        Assessment & Plan        Active Hospital Problems:  There are no active hospital problems to display for this patient.    Plan:     #Right Ovarian Cyst  #Bilateral non-obstructing renal stone    - CT a/p shows bilateral non-obstructing renal stones complicated by bibasilar opacities that may represent atelectasis or infiltrated along with 5.5cm right ovarian cystic lesion.      -Pelvic US shows cystic mass in the right ovary measuring up to 5.8cm with no evidence of right ovarian torsion.    - Due to lethargy, unable to get complete history or accurately assess    - pain possibly from ovarian cyst, will have OBGYN assess    - As kidney stones non-obstructing, will refrain from urology consult at this time, reportedly has otpt urology appointment for lithotripsy    - Avoid opioids due to lethargy    - Toradol PRN pain    - Patient without clinical signs of pneumonia, CT chest from 2018 shows bibasilar atelectasis, suspect this is what showed up on CT a/p     - lipase 85, no evidence of pancreatic inflammation on CT     - NPO except meds    - IVF    #UTI    - UA concerning for UTI, could also be inflammation related to kidney stones    - rocephin    - Urine culture    #Mild cognitive disorder    - follows with Dr. Seipel    - resume home Aricept    - unsure of baseline    - patient's spouse is hospitalized, reportedly she is unable to care for self, may need placement    - pt/ot    #GERD    - PPI    #Anxiety  #Depression    - resume home Abilify    - resume home Wellbutrin and Lexapro    - Resume home Klonopin PRN    #Hypothyroid    - resume home synthroid    #HTN    - BP stable hold home losartan    - resume home atenolol    #HLD    - resume home Gemfibrozil if brings in, not on formular      DVT prophylaxis: SCDs    CODE STATUS:       Admission Status:  I believe this patient meets observatoin status.    I discussed the patient's findings and my recommendations with patient.    This patient has been examined wearing appropriate Personal Protective Equipment and discussed with Patient and ED staff. 23      Signature: Electronically signed by Jacky Brito DO, 23, 3:18 AM EDT.      Electronically signed by Jacky Brito DO at 23 0340          Physician Progress Notes (last 24 hours)      Lucila Santana MD at 23 1013              [facility] Medicine Services   Daily Progress Note    Patient Name: Shannon Dyson  : 1950  MRN: [id number]  Primary Care Physician:  Ana Paula Braun APRN  Date of admission: 2023  Date and Time of Service: 23 at 8:15am      Subjective       Chief Complaint: right flank pain    Patient was seen and examined this am. In chair awake and alert. Tells me about her  in ICU. Starting to have Rt flank pain. Awaiting gynecology eval    ROS   Per HPI      Objective       Vitals:   Temp:  [98 °F (36.7 °C)-99.1 °F (37.3 °C)] 98 °F (36.7 °C)  Heart Rate:  [68-84] 80  Resp:  [16-18] 18  BP:  (107-146)/(62-81) 129/62  Flow (L/min):  [2-3] 2    Physical Exam   Constitutional:       Appearance: She is obese.      Comments: Awake and alert, conversational, concerned about how  is doing in ICU  HENT:      Head: Normocephalic and atraumatic.      Nose: Nose normal. No congestion.      Mouth/Throat:      Pharynx: Oropharynx is clear. No oropharyngeal exudate.   Eyes:      General: No scleral icterus.  Cardiovascular:      Rate and Rhythm: Normal rate and regular rhythm.      Heart sounds: No murmur heard.    No friction rub. No gallop.   Pulmonary:      Effort: No respiratory distress.      Breath sounds: No wheezing or rales.   Abdominal:      General: There is no distension.      Tenderness: There is mild Rt flank tenderness. There is no guarding.   Musculoskeletal:         General: No swelling or deformity.      Cervical back: Normal range of motion. No rigidity.      Right lower leg: No edema.      Left lower leg: No edema.   Skin:     Coloration: Skin is not jaundiced.      Findings: No bruising or lesion.   Neurological:      General: No focal deficit present.      Mental Status: She is awake and alert     Motor: Weakness present.      Result Review    Result Review:  I have personally reviewed the results from the time of this admission to 5/26/2023 10:13 EDT and agree with these findings:  [x]  Laboratory  []  Microbiology  [x]  Radiology  []  EKG/Telemetry   []  Cardiology/Vascular   []  Pathology  []  Old records  [x]  Other:  Most notable findings include: UTI  CTAP- bilateral non-obstructing renal stones complicated by bibasilar opacities that may represent atelectasis or infiltrated along with 5.5cm right ovarian cystic lesion.  Pelvic US shows cystic mass in the right ovary measuring up to 5.8cm with no evidence of right ovarian torsion.        Assessment & Plan      Brief Patient Summary:  Shannon Dyson is a 72 y.o. female with Hx HTN, Depression kidney stones who presents with Rt  flank pain. No fever, chills   UA shows possible UTI.  CT a/p shows bilateral non-obstructing renal stones complicated by bibasilar opacities that may represent atelectasis or infiltrated along with 5.5cm right ovarian cystic lesion.  Pelvic US shows cystic mass in the right ovary measuring up to 5.8cm with no evidence of right ovarian torsion.  She is scheduled for lithotripsy next week. Awaiting gyne consult      ARIPiprazole, 10 mg, Oral, Daily  atenolol, 50 mg, Oral, Daily  buPROPion XL, 150 mg, Oral, Daily  [START ON 5/27/2023] cefTRIAXone, 1 g, Intravenous, Q24H  donepezil, 10 mg, Oral, Nightly  levothyroxine, 112 mcg, Oral, Q AM  senna-docusate sodium, 2 tablet, Oral, BID  sodium chloride, 10 mL, Intravenous, Q12H       lactated ringers, 75 mL/hr, Last Rate: 75 mL/hr (05/26/23 0351)         Active Hospital Problems:  Active Hospital Problems    Diagnosis    • **Right flank pain      Plan:   #Right Ovarian Cyst  #Bilateral non-obstructing renal stone    - CT a/p shows bilateral non-obstructing renal stones complicated by bibasilar opacities that may represent atelectasis or infiltrated along with 5.5cm right ovarian cystic lesion.      -Pelvic US shows cystic mass in the right ovary measuring up to 5.8cm with no evidence of right ovarian torsion.    - Awaiting gynecology eval    - As kidney stones non-obstructing, will refrain from urology consult at this time, reportedly has otpt urology appointment for lithotripsy next week Wednesday    - Avoid opioids due to lethargy    - Toradol PRN pain    - Patient without clinical signs of pneumonia, CT chest from 2018 shows bibasilar atelectasis, suspect this is what showed up on CT a/p    - lipase 85, no evidence of pancreatic inflammation on CT     -resume diet     #UTI    - UA concerning for UTI, could also be inflammation related to kidney stones    - cont rocephin    - Urine culture     #Mild cognitive disorder    - follows with Dr. Seipel    - cont Aricept    -  awake and alert    - patient's spouse is hospitalized, reportedly she is unable to care for self, may need placement    - pt/ot     #GERD    - PPI     #Anxiety  #Depression    - cont Abilify    - cont Wellbutrin and Lexapro    - cont Klonopin PRN     #Hypothyroid    - cont synthroid     #HTN    - BP stable hold home losartan    - cont atenolol     #HLD    - resume home Gemfibrozil if brings in, not on formular    DVT prophylaxis:  Mechanical DVT prophylaxis orders are present.    CODE STATUS:    Code Status (Patient has no pulse and is not breathing): CPR (Attempt to Resuscitate)  Medical Interventions (Patient has pulse or is breathing): Full Support      Disposition:  I expect patient to be discharged later today or in am      Electronically signed by Lucila Santana MD, 05/26/23, 10:13 EDT.  [facility] Hospitalist Team             Electronically signed by Lucila Santana MD at 05/26/23 1023          Physical Therapy Notes (last 24 hours)      Prachi Pino, PT at 05/26/23 1339  Version 1 of 1       Goal Outcome Evaluation:  Plan of Care Reviewed With: patient           Outcome Evaluation: Patient is a 73 y/o F who was admitted 5/25/23 with c/o R flank pain. Pt found to have R ovarian cyst, bilateral non obstruction renal stones, and UTI. PMHx: GERD, anxiety, depression, HTN, HLD, hypothyroidism. Pt is scheduled to have lithotripsy procedure next Wed on outpatient basis.  Pt reports she lives in a one story home with one step to enter with . Pt's  is in [facility] and just underwent open heart surgery. Pt reports being independent with ADLs, IADLs, transfers, mobility, and driving. Pt owns no AD. Pt completed bed mobility with min A x1 for supine to sit and sit to supine.  Required max A x2 to be scooted up in bed this date due to weakness and pain in R sided low back.  Once sitting EOB pt c/o dizziness and headache.  BP taken reading 99/61.  Pt also had c/o getting too hot.  Pt was assisted back to supine and  assisted up in bed.  Nursing notified of pt's symptoms.  Pt unable to assist with scooting up in bed due to weakness and low back pain.  BP taken 2 more times (supine position) readin/57 and ~5 minutes later reading 102/50.  Pt presents with deficits in functional mobility, strength, activity tolerance, and slight cognitive deficits present. At this time PT recommends SNF pending her progress in acute setting.  If pt's mobility/symptoms/cognitiion improves, then she could hopefully d/c home at that time with home health.  In current state, based on findings today, SNF recommended.           Electronically signed by Prachi Pino, PT at 23 1339     Prachi Pino, PT at 23 1342  Version 1 of 1         Patient Name: Shannon Dyson  : 1950    MRN: [id number]                              Today's Date: 2023       Admit Date: 2023    Visit Dx:     ICD-10-CM ICD-9-CM   1. Right flank pain  R10.9 789.09   2. Ovarian mass, right  N83.8 620.8   3. Intractable pain  R52 780.96     Patient Active Problem List   Diagnosis   • Allergic rhinitis   • ALBA (generalized anxiety disorder)   • Bunion   • Carpal tunnel syndrome   • Degenerative joint disease   • Dependent edema   • Dermatitis   • Elevated antinuclear antibody (COMPA) level   • Encounter for immunization   • Encounter for screening for osteoporosis   • Fibrocystic breast disease   • Gastroesophageal reflux disease   • Hyperlipidemia   • Hypertension   • Hypokalemia   • Hypovitaminosis D   • Hypothyroidism   • Low back pain   • Myalgia   • Neurodermatitis   • Osteoporosis   • Other long term (current) drug therapy   • Mouth pain   • Elevated serum creatinine   • Mild cognitive disorder   • Loss of appetite   • Pain in gums   • Itching   • Panic disorder with agoraphobia   • Moderate episode of recurrent major depressive disorder   • Pharyngitis   • Sore throat   • Cough   • Nail fungus   • Bilateral posterior capsular opacification    • Corneal endothelial dystrophy   • Dry eyes   • Right flank pain     Past Medical History:   Diagnosis Date   • Allergic As a teenager   • Anxiety    • Arthritis ??   • Breast cyst    • Cataract About 10 years ago   • Depression Dont know   • Fibrocystic breast    • GERD (gastroesophageal reflux disease)    • HL (hearing loss) ??   • Hyperlipidemia    • Hypertension    • Hypothyroidism    • Panic disorder      Past Surgical History:   Procedure Laterality Date   • APPENDECTOMY     • BREAST BIOPSY     • BREAST SURGERY     • COLONOSCOPY  3 years ago   • HYSTERECTOMY        General Information     Row Name 05/26/23 1329          Physical Therapy Time and Intention    Document Type evaluation  -EJ     Mode of Treatment physical therapy  -     Row Name 05/26/23 [facility]9          General Information    Patient Profile Reviewed yes  -EJ     Prior Level of Function independent:;ADL's;all household mobility;community mobility;driving;home management  -EJ     Existing Precautions/Restrictions fall;orthostatic hypotension  -EJ     Barriers to Rehab none identified  -     Row Name 05/26/23 1329          Living Environment    People in Home spouse  -EJ     Name(s) of People in Home Chris (); however, he is currently at hospital also due to heart issues.  -EJ     Row Name 05/26/23 1329          Home Main Entrance    Number of Stairs, Main Entrance one  -EJ     Stair Railings, Main Entrance none  -EJ     Row Name 05/26/23 1329          Stairs Within Home, Primary    Number of Stairs, Within Home, Primary none  -EJ     Row Name 05/26/23 1329          Cognition    Orientation Status (Cognition) oriented x 3  -     Row Name 05/26/23 1329          Safety Issues, Functional Mobility    Impairments Affecting Function (Mobility) balance;endurance/activity tolerance;pain;strength  -EJ           User Key  (r) = Recorded By, (t) = Taken By, (c) = Cosigned By    Initials Name Provider Type    EJ Prachi Pino, PT Physical  Therapist               Mobility     Row Name 05/26/23 1330          Bed Mobility    Bed Mobility bed mobility (all) activities;supine-sit;sit-supine;scooting/bridging  -EJ     Scooting/Bridging [place] (Bed Mobility) maximum assist (25% patient effort);2 person assist  -EJ     Supine-Sit [place] (Bed Mobility) minimum assist (75% patient effort)  -EJ     Sit-Supine [place] (Bed Mobility) minimum assist (75% patient effort)  -EJ     Assistive Device (Bed Mobility) bed rails;draw sheet  -EJ           User Key  (r) = Recorded By, (t) = Taken By, (c) = Cosigned By    Initials Name Provider Type    Prachi Santana, PT Physical Therapist               Obj/Interventions     Row Name 05/26/23 1330          Range of Motion Comprehensive    General Range of Motion bilateral lower extremity ROM WFL  -EJ     Row Name 05/26/23 1330          Strength Comprehensive (MMT)    Comment, General Manual Muscle Testing (MMT) Assessment BLE strength grossly 4+/5 with exception of R hip flex and abd 3-/5 (painful).  -EJ     Row Name 05/26/23 1330          Balance    Balance Assessment sitting static balance;sitting dynamic balance  -EJ     Static Sitting Balance modified independence  -EJ     Dynamic Sitting Balance standby assist  -EJ     Position, Sitting Balance sitting edge of bed  -EJ     Balance Interventions sitting;static;dynamic;minimal challenge  -EJ     Row Name 05/26/23 1330          Sensory Assessment (Somatosensory)    Sensory Assessment (Somatosensory) other (see comments)  Pt reported diminished sensation during dermatomal testing (light touch) L4 dermatomal distribution RLE.  -EJ           User Key  (r) = Recorded By, (t) = Taken By, (c) = Cosigned By    Initials Name Provider Type    Prachi Santana, PT Physical Therapist               Goals/Plan     Row Name 05/26/23 1337          Bed Mobility Goal 1 (PT)    Activity/Assistive Device (Bed Mobility Goal 1, PT) bed mobility activities, all  -EJ      [place] Level/Cues Needed (Bed Mobility Goal 1, PT) modified independence  -EJ     Time Frame (Bed Mobility Goal 1, PT) long term goal (LTG);2 weeks  -EJ     Row Name 05/26/23 1047          Transfer Goal 1 (PT)    Activity/Assistive Device (Transfer Goal 1, PT) transfers, all;walker, rolling  -EJ     [place] Level/Cues Needed (Transfer Goal 1, PT) supervision required  -EJ     Time Frame (Transfer Goal 1, PT) long term goal (LTG);2 weeks  -EJ     Row Name 05/26/23 9127          Gait Training Goal 1 (PT)    Activity/Assistive Device (Gait Training Goal 1, PT) gait (walking locomotion);walker, rolling  -EJ     [place] Level (Gait Training Goal 1, PT) standby assist  -EJ     Distance (Gait Training Goal 1, PT) 75  -EJ     Time Frame (Gait Training Goal 1, PT) long term goal (LTG);2 weeks  -EJ     Row Name 05/26/23 5457          Therapy Assessment/Plan (PT)    Planned Therapy Interventions (PT) balance training;bed mobility training;gait training;patient/family education;neuromuscular re-education;strengthening;transfer training  -EJ           User Key  (r) = Recorded By, (t) = Taken By, (c) = Cosigned By    Initials Name Provider Type    EJ Prachi Pino, PT Physical Therapist               Clinical Impression     Row Name 05/26/23 6626          Pain    Pretreatment Pain Rating 2/10  -EJ     Posttreatment Pain Rating 6/10  -EJ     Pain Location - Side/Orientation Right  -EJ     Pain Location - back  -EJ     Pain Intervention(s) Repositioned;Therapeutic presence  -EJ     Row Name 05/26/23 8466          Plan of Care Review    Plan of Care Reviewed With patient  -EJ     Outcome Evaluation Patient is a 73 y/o F who was admitted 5/25/23 with c/o R flank pain. Pt found to have R ovarian cyst, bilateral non obstruction renal stones, and UTI. PMHx: GERD, anxiety, depression, HTN, HLD, hypothyroidism. Pt is scheduled to have lithotripsy procedure next Wed on outpatient basis.  Pt reports she lives in a one  story home with one step to enter with . Pt's  is in [facility] and just underwent open heart surgery. Pt reports being independent with ADLs, IADLs, transfers, mobility, and driving. Pt owns no AD. She reports she is having R sided LBP that also refers down her R leg to her calf.  Has been sleeping in recliner in hospital with spouse as he is currently in hospital. Pt completed bed mobility with min A x1 for supine to sit and sit to supine.  Required max A x2 to be scooted up in bed this date due to weakness and pain in R sided low back.  Once sitting EOB pt c/o dizziness and headache.  BP taken reading 99/61.  Pt also had c/o getting too hot.  Pt was assisted back to supine and assisted up in bed.  Nursing notified of pt's symptoms.  Pt unable to assist with scooting up in bed due to weakness and low back pain.  BP taken 2 more times (supine position) readin/57 and ~5 minutes later reading 102/50.  Pt presents with deficits in functional mobility, strength, activity tolerance, and slight cognitive deficits present. At this time PT recommends SNF pending her progress in acute setting.  If pt's mobility/symptoms/cognitiion improves, then she could hopefully d/c home at that time with home health.  In current state, based on findings today, SNF recommended.  -     Row Name 23 8900          Therapy Assessment/Plan (PT)    Criteria for Skilled Interventions Met (PT) yes;skilled treatment is necessary  -EJ     Therapy Frequency (PT) 5 times/wk  -EJ     Predicted Duration of Therapy Intervention (PT) until discharge  -EJ     Row Name 23 2978          Positioning and Restraints    Pre-Treatment Position in bed  -EJ     Post Treatment Position bed  -EJ     In Bed notified nsg;exit alarm on;patient within staff view;with family/caregiver;with nsg;encouraged to call for assist  -EJ           User Key  (r) = Recorded By, (t) = Taken By, (c) = Cosigned By    Initials Name Provider Type    EJ  Prachi Pino, PT Physical Therapist               Outcome Measures     Row Name 05/26/23 1338 05/26/23 0800       How much help from another person do you currently need...    Turning from your back to your side while in flat bed without using bedrails? 3  -EJ 3  -EH    Moving from lying on back to sitting on the side of a flat bed without bedrails? 3  -EJ 3  -EH    Moving to and from a bed to a chair (including a wheelchair)? 3  -EJ 3  -EH    Standing up from a chair using your arms (e.g., wheelchair, bedside chair)? 3  -EJ 3  -EH    Climbing 3-5 steps with a railing? 2  -EJ 3  -EH    To walk in hospital room? 2  -EJ 3  -EH    AM-PAC 6 Clicks Score (PT) 16  -EJ 18  -EH    Highest level of mobility 5 --> Static standing  -EJ 6 --> Walked 10 steps or more  -    Row Name 05/26/23 0325          How much help from another person do you currently need...    Turning from your back to your side while in flat bed without using bedrails? 4  -TS     Moving from lying on back to sitting on the side of a flat bed without bedrails? 3  -TS     Moving to and from a bed to a chair (including a wheelchair)? 2  -TS     Standing up from a chair using your arms (e.g., wheelchair, bedside chair)? 3  -TS     Climbing 3-5 steps with a railing? 2  -TS     To walk in hospital room? 2  -TS     AM-PAC 6 Clicks Score (PT) 16  -TS     Highest level of mobility 5 --> Static standing  -TS     Row Name 05/26/23 1338 05/26/23 1003       Functional Assessment    Outcome Measure Options AM-PAC 6 Clicks Basic Mobility (PT)  - AM-PAC 6 Clicks Daily Activity (OT)  -DEB          User Key  (r) = Recorded By, (t) = Taken By, (c) = Cosigned By    Initials Name Provider Type    Prachi Santana, PT Physical Therapist    EH Prachi Cervantes, RN Registered Nurse    Agueda Gore RN Registered Nurse    Dylan Castrejon OT Occupational Therapist                             Physical Therapy Education     Title: PT OT SLP Therapies (In Progress)      Topic: Physical Therapy (Done)     Point: Mobility training (Done)     Learning Progress Summary           Patient Acceptance, E, VU,NR by REECE at 5/26/2023 1339                   Point: Body mechanics (Done)     Learning Progress Summary           Patient Acceptance, E, VU,NR by REECE at 5/26/2023 1339                   Point: Precautions (Done)     Learning Progress Summary           Patient Acceptance, E, VU,NR by REECE at 5/26/2023 1339                               User Key     Initials Effective Dates Name Provider Type Discipline     06/16/21 -  Prachi Pino, PT Physical Therapist PT              PT Recommendation and Plan  Planned Therapy Interventions (PT): balance training, bed mobility training, gait training, patient/family education, neuromuscular re-education, strengthening, transfer training  Plan of Care Reviewed With: patient  Outcome Evaluation: Patient is a 73 y/o F who was admitted 5/25/23 with c/o R flank pain. Pt found to have R ovarian cyst, bilateral non obstruction renal stones, and UTI. PMHx: GERD, anxiety, depression, HTN, HLD, hypothyroidism. Pt is scheduled to have lithotripsy procedure next Wed on outpatient basis.  Pt reports she lives in a one story home with one step to enter with . Pt's  is in [facility] and just underwent open heart surgery. Pt reports being independent with ADLs, IADLs, transfers, mobility, and driving. Pt owns no AD. She reports she is having R sided LBP that also refers down her R leg to her calf.  Has been sleeping in recliner in hospital with spouse as he is currently in hospital. Pt completed bed mobility with min A x1 for supine to sit and sit to supine.  Required max A x2 to be scooted up in bed this date due to weakness and pain in R sided low back.  Once sitting EOB pt c/o dizziness and headache.  BP taken reading 99/61.  Pt also had c/o getting too hot.  Pt was assisted back to supine and assisted up in bed.  Nursing notified of pt's symptoms.   Pt unable to assist with scooting up in bed due to weakness and low back pain.  BP taken 2 more times (supine position) readin/57 and ~5 minutes later reading 102/50.  Pt presents with deficits in functional mobility, strength, activity tolerance, and slight cognitive deficits present. At this time PT recommends SNF pending her progress in acute setting.  If pt's mobility/symptoms/cognitiion improves, then she could hopefully d/c home at that time with home health.  In current state, based on findings today, SNF recommended.     Time Calculation:    PT Charges     Row Name 23 1341             Time Calculation    Start Time 1010  -EJ      Stop Time 1036  -EJ      Time Calculation (min) 26 min  -EJ      PT Received On 23  -EJ      PT - Next Appointment 23  -EJ      PT Goal Re-Cert Due Date 23  -EJ         Time Calculation- PT    Total Timed Code Minutes- PT 0 minute(s)  -EJ            User Key  (r) = Recorded By, (t) = Taken By, (c) = Cosigned By    Initials Name Provider Type     Prachi Pino, PT Physical Therapist              Therapy Charges for Today     Code Description Service Date Service Provider Modifiers Qty    33667050712 HC PT EVAL MOD COMPLEXITY 4 2023 Prachi Pino, PT GP 1          PT G-Codes  Outcome Measure Options: AM-PAC 6 Clicks Basic Mobility (PT)  AM-PAC 6 Clicks Score (PT): 16  AM-PAC 6 Clicks Score (OT): 17  PT Discharge Summary  Anticipated Discharge Disposition (PT): skilled nursing facility    Prachi Pino PT  2023      Electronically signed by Prachi Pino, PT at 23 1342          Occupational Therapy Notes (last 24 hours)      Dylan Bazan, OT at 23 3257          Patient Name: Shannon Dyson  : 1950    MRN: [id number]                              Today's Date: 2023       Admit Date: 2023    Visit Dx:     ICD-10-CM ICD-9-CM   1. Right flank pain  R10.9 789.09   2. Ovarian mass, right  N83.8 620.8   3.  Intractable pain  R52 780.96     Patient Active Problem List   Diagnosis   • Allergic rhinitis   • ALBA (generalized anxiety disorder)   • Bunion   • Carpal tunnel syndrome   • Degenerative joint disease   • Dependent edema   • Dermatitis   • Elevated antinuclear antibody (COMPA) level   • Encounter for immunization   • Encounter for screening for osteoporosis   • Fibrocystic breast disease   • Gastroesophageal reflux disease   • Hyperlipidemia   • Hypertension   • Hypokalemia   • Hypovitaminosis D   • Hypothyroidism   • Low back pain   • Myalgia   • Neurodermatitis   • Osteoporosis   • Other long term (current) drug therapy   • Mouth pain   • Elevated serum creatinine   • Mild cognitive disorder   • Loss of appetite   • Pain in gums   • Itching   • Panic disorder with agoraphobia   • Moderate episode of recurrent major depressive disorder   • Pharyngitis   • Sore throat   • Cough   • Nail fungus   • Bilateral posterior capsular opacification   • Corneal endothelial dystrophy   • Dry eyes   • Right flank pain     Past Medical History:   Diagnosis Date   • Allergic As a teenager   • Anxiety    • Arthritis ??   • Breast cyst    • Cataract About 10 years ago   • Depression Dont know   • Fibrocystic breast    • GERD (gastroesophageal reflux disease)    • HL (hearing loss) ??   • Hyperlipidemia    • Hypertension    • Hypothyroidism    • Panic disorder      Past Surgical History:   Procedure Laterality Date   • APPENDECTOMY     • BREAST BIOPSY     • BREAST SURGERY     • COLONOSCOPY  3 years ago   • HYSTERECTOMY        General Information     Row Name 05/26/23 0936          OT Time and Intention    Document Type evaluation  -     Mode of Treatment occupational therapy  -     Row Name 05/26/23 0936          General Information    Prior Level of Function independent:;ADL's;home management;all household mobility;community mobility;driving  -MK     Existing Precautions/Restrictions fall  -MK     Barriers to Rehab none  identified  -     Row Name 05/26/23 0936          Living Environment    People in Home spouse  -     Row Name 05/26/23 0936          Home Main Entrance    Number of Stairs, Main Entrance one  -     Stair Railings, Main Entrance none  -[facility] Name 05/26/23 0936          Stairs Within Home, Primary    Number of Stairs, Within Home, Primary none  -[facility] Name 05/26/23 0936          Cognition    Orientation Status (Cognition) oriented x 3  -     Row Name 05/26/23 0936          Safety Issues, Functional Mobility    Impairments Affecting Function (Mobility) balance;endurance/activity tolerance;pain;strength  -           User Key  (r) = Recorded By, (t) = Taken By, (c) = Cosigned By    Initials Name Provider Type     Dylan Bazan, OT Occupational Therapist                 Mobility/ADL's     [facility] Name 05/26/23 0943          Bed Mobility    Bed Mobility bed mobility (all) activities  -     All Activities, [place] (Bed Mobility) minimum assist (75% patient effort)  -[facility] Name 05/26/23 0943          Transfers    Transfers toilet transfer;sit-stand transfer  -MK     Row Name 05/26/23 0943          Sit-Stand Transfer    Sit-Stand [place] (Transfers) minimum assist (75% patient effort);contact guard  -     Assistive Device (Sit-Stand Transfers) walker, front-wheeled  -[facility] Name 05/26/23 0943          Toilet Transfer    Type (Toilet Transfer) sit-stand  -     [place] Level (Toilet Transfer) minimum assist (75% patient effort);contact guard  -     Assistive Device (Toilet Transfer) walker, front-wheeled  -[facility] Name 05/26/23 0943          Activities of Daily Living    BADL Assessment/Intervention toileting  -[facility] Name 05/26/23 0943          Toileting Assessment/Training    [place] Level (Toileting) toileting skills;minimum assist (75% patient effort);contact guard assist  -           User Key  (r) = Recorded By, (t) = Taken By, (c) = Cosigned By    Initials  Name Provider Type    Dylan Castrejon OT Occupational Therapist               Obj/Interventions     Row Name 05/26/23 0953          Range of Motion Comprehensive    General Range of Motion bilateral upper extremity ROM WFL  -[facility] Name 05/26/23 0953          Strength Comprehensive (MMT)    General Manual Muscle Testing (MMT) Assessment upper extremity strength deficits identified  -     Comment, General Manual Muscle Testing (MMT) Assessment BUE strength grossly 3/5  -           User Key  (r) = Recorded By, (t) = Taken By, (c) = Cosigned By    Initials Name Provider Type    Dylan Castrejon OT Occupational Therapist               Goals/Plan     Row Name 05/26/23 1001          Bathing Goal 1 (OT)    Activity/Device (Bathing Goal 1, OT) bathing skills, all  -     [place] Level/Cues Needed (Bathing Goal 1, OT) supervision required  -     Time Frame (Bathing Goal 1, OT) 2 weeks  -[facility] Name 05/26/23 1001          Dressing Goal 1 (OT)    Activity/Device (Dressing Goal 1, OT) dressing skills, all  -     [place]/Cues Needed (Dressing Goal 1, OT) supervision required  -     Time Frame (Dressing Goal 1, OT) 2 weeks  -[facility] Name 05/26/23 1001          Toileting Goal 1 (OT)    Activity/Device (Toileting Goal 1, OT) toileting skills, all  -     [place] Level/Cues Needed (Toileting Goal 1, OT) supervision required  -     Time Frame (Toileting Goal 1, OT) 2 weeks  -[facility] Name 05/26/23 1001          Therapy Assessment/Plan (OT)    Planned Therapy Interventions (OT) activity tolerance training;functional balance retraining;BADL retraining;neuromuscular control/coordination retraining;patient/caregiver education/training;transfer/mobility retraining;strengthening exercise;ROM/therapeutic exercise  -           User Key  (r) = Recorded By, (t) = Taken By, (c) = Cosigned By    Initials Name Provider Type    Dylan Castrejon OT Occupational Therapist                Clinical Impression     Row Name 05/26/23 0953          Pain Assessment    Pretreatment Pain Rating 2/10  -     Posttreatment Pain Rating 2/10  -     Pain Location - Side/Orientation Right  -     Pain Location - flank  -     Pain Intervention(s) Repositioned;Therapeutic presence  -     Row Name 05/26/23 0953          Plan of Care Review    Plan of Care Reviewed With patient  -     Outcome Evaluation Pt is a 71 y/o F admitted for right flank pain.  Pt found to have R ovarian cyst, bilateral non obstruction renal stones, and UTI. PMHx: GERD, anxiety, depression, HTN, HLD, hypothyroidism.  Pt reports she lives in a one story home with one step to enter with .  Pt's  is in [facility] and just underwent open heart surgery.  Pt reports being independent with ADLs, IADLs, transfers, mobility, and driving.  Pt owns no AD.  Pt completed bed mobility with MIN A.  Pt t/f from bed <> BSC with MIN A-CGA.  Pt completed toileting with MIN A-CGA.  Pt completed functional mobility to chair using RW with CGA.  Pt presents with deficits in self care, transfers, functional mobility, strength, activity tolerance, and indicating need for skilled OT services.  OT recommending SNF as patient not safe to return home alone.  -     Row Name 05/26/23 0953          Therapy Assessment/Plan (OT)    Criteria for Skilled Therapeutic Interventions Met (OT) yes;meets criteria  -     Therapy Frequency (OT) 5 times/wk  -     Predicted Duration of Therapy Intervention (OT) until D/C  -     Row Name 05/26/23 0953          Therapy Plan Review/Discharge Plan (OT)    Anticipated Discharge Disposition (OT) skilled nursing facility  -     Row Name 05/26/23 0953          Positioning and Restraints    Pre-Treatment Position in bed  -     Post Treatment Position chair  -     In Chair notified nsg;sitting;call light within reach;encouraged to call for assist;exit alarm on  -           User Key  (r) = Recorded By, (t) = Taken  By, (c) = Cosigned By    Initials Name Provider Type    Dylan Castrejon OT Occupational Therapist               Outcome Measures     Row Name 05/26/23 1003          How much help from another is currently needed...    Putting on and taking off regular lower body clothing? 2  -MK     Bathing (including washing, rinsing, and drying) 2  -MK     Toileting (which includes using toilet bed pan or urinal) 3  -MK     Putting on and taking off regular upper body clothing 3  -MK     Taking care of personal grooming (such as brushing teeth) 3  -MK     Eating meals 4  -MK     AM-PAC 6 Clicks Score (OT) 17  -MK     Row Name 05/26/23 0325          How much help from another person do you currently need...    Turning from your back to your side while in flat bed without using bedrails? 4  -TS     Moving from lying on back to sitting on the side of a flat bed without bedrails? 3  -TS     Moving to and from a bed to a chair (including a wheelchair)? 2  -TS     Standing up from a chair using your arms (e.g., wheelchair, bedside chair)? 3  -TS     Climbing 3-5 steps with a railing? 2  -TS     To walk in hospital room? 2  -TS     AM-PAC 6 Clicks Score (PT) 16  -TS     Row Name 05/26/23 1003          Functional Assessment    Outcome Measure Options AM-PAC 6 Clicks Daily Activity (OT)  -           User Key  (r) = Recorded By, (t) = Taken By, (c) = Cosigned By    Initials Name Provider Type     Agueda Ortiz, RN Registered Nurse    Dylan Castrejon OT Occupational Therapist                Occupational Therapy Education     Title: PT OT SLP Therapies (In Progress)     Topic: Occupational Therapy (In Progress)     Point: ADL training (Done)     Description:   Instruct learner(s) on proper safety adaptation and remediation techniques during self care or transfers.   Instruct in proper use of assistive devices.              Learning Progress Summary           Patient Acceptance, E, VU by DEB at 5/26/2023 1006                    Point: Home exercise program (Not Started)     Description:   Instruct learner(s) on appropriate technique for monitoring, assisting and/or progressing therapeutic exercises/activities.              Learner Progress:  Not documented in this visit.          Point: Precautions (Not Started)     Description:   Instruct learner(s) on prescribed precautions during self-care and functional transfers.              Learner Progress:  Not documented in this visit.          Point: Body mechanics (Not Started)     Description:   Instruct learner(s) on proper positioning and spine alignment during self-care, functional mobility activities and/or exercises.              Learner Progress:  Not documented in this visit.                      User Key     Initials Effective Dates Name Provider Type Discipline     02/17/22 -  Dylan Bazan OT Occupational Therapist OT              OT Recommendation and Plan  Planned Therapy Interventions (OT): activity tolerance training, functional balance retraining, BADL retraining, neuromuscular control/coordination retraining, patient/caregiver education/training, transfer/mobility retraining, strengthening exercise, ROM/therapeutic exercise  Therapy Frequency (OT): 5 times/wk  Plan of Care Review  Plan of Care Reviewed With: patient  Outcome Evaluation: Pt is a 73 y/o F admitted for right flank pain.  Pt found to have R ovarian cyst, bilateral non obstruction renal stones, and UTI. PMHx: GERD, anxiety, depression, HTN, HLD, hypothyroidism.  Pt reports she lives in a one story home with one step to enter with .  Pt's  is in [facility] and just underwent open heart surgery.  Pt reports being independent with ADLs, IADLs, transfers, mobility, and driving.  Pt owns no AD.  Pt completed bed mobility with MIN A.  Pt t/f from bed <> BSC with MIN A-CGA.  Pt completed toileting with MIN A-CGA.  Pt completed functional mobility to chair using RW with CGA.  Pt presents with deficits in  self care, transfers, functional mobility, strength, activity tolerance, and indicating need for skilled OT services.  OT recommending SNF as patient not safe to return home alone.     Time Calculation:    Time Calculation- OT     Row Name 05/26/23 1007             Time Calculation- OT    OT Start Time 0745  -MK      OT Stop Time 0807  -      OT Time Calculation (min) 22 min  -      Total Timed Code Minutes- OT 0 minute(s)  -MK      OT Received On 05/26/23  -      OT - Next Appointment 05/29/23  -      OT Goal Re-Cert Due Date 06/09/23  -            User Key  (r) = Recorded By, (t) = Taken By, (c) = Cosigned By    Initials Name Provider Type    Dylan Castrejon OT Occupational Therapist              Therapy Charges for Today     Code Description Service Date Service Provider Modifiers Qty    09294695415 HC OT EVAL MOD COMPLEXITY 4 5/26/2023 Dylan Bazan OT GO 1               Dylan Bazan OT  5/26/2023    Electronically signed by Dylan Bazan OT at 05/26/23 1009     Dylan Bazan OT at 05/26/23 0745        Goal Outcome Evaluation:  Outcome Evaluation: Pt is a 71 y/o F admitted for right flank pain.  Pt found to have R ovarian cyst, bilateral non obstruction renal stones, and UTI. PMHx: GERD, anxiety, depression, HTN, HLD, hypothyroidism.  Pt reports she lives in a one story home with one step to enter with .  Pt's  is in [facility] and just underwent open heart surgery.  Pt reports being independent with ADLs, IADLs, transfers, mobility, and driving.  Pt owns no AD.  Pt completed bed mobility with MIN A.  Pt t/f from bed <> BSC with MIN A-CGA.  Pt completed toileting with MIN A-CGA.  Pt completed functional mobility to chair using RW with CGA.  Pt presents with deficits in self care, transfers, functional mobility, strength, activity tolerance, and indicating need for skilled OT services.  OT recommending SNF as patient not safe to return home alone.           Electronically  signed by Dylan Bazan, OT at 05/26/23 5344

## 2023-05-26 NOTE — PLAN OF CARE
Goal Outcome Evaluation:  Outcome Evaluation: Pt is a 73 y/o F admitted for right flank pain.  Pt found to have R ovarian cyst, bilateral non obstruction renal stones, and UTI. PMHx: GERD, anxiety, depression, HTN, HLD, hypothyroidism.  Pt reports she lives in a one story home with one step to enter with .  Pt's  is in [facility] and just underwent open heart surgery.  Pt reports being independent with ADLs, IADLs, transfers, mobility, and driving.  Pt owns no AD.  Pt completed bed mobility with MIN A.  Pt t/f from bed <> BSC with MIN A-CGA.  Pt completed toileting with MIN A-CGA.  Pt completed functional mobility to chair using RW with CGA.  Pt presents with deficits in self care, transfers, functional mobility, strength, activity tolerance, and indicating need for skilled OT services.  OT recommending SNF as patient not safe to return home alone.

## 2023-05-26 NOTE — ED NOTES
Nursing report ED to floor  Shannon Stallingston  72 y.o.  female    HPI:   Chief Complaint   Patient presents with    Flank Pain       Admitting doctor:   Jacky Brito DO    Admitting diagnosis:   The primary encounter diagnosis was Right flank pain. Diagnoses of Ovarian mass, right and Intractable pain were also pertinent to this visit.    Code status:   Current Code Status       Date Active Code Status Order ID Comments User Context       Not on file            Allergies:   Codeine, Penicillin g, and Amlodipine    Isolation:  No active isolations     Fall Risk:  Fall Risk Assessment was completed, and patient is at moderate risk for falls.   Predictive Model Details         11 (Low) Factor Value    Calculated 5/26/2023 02:08 Age 72    Risk of Fall Model Musculoskeletal Assessment WDL     Active Peripheral IV Present     Imaging order in this encounter Present     Respiratory Rate 18     Skin Assessment WDL     Financial Class Other     Magnesium not on file     Number of Distinct Medication Classes administered 4     Diastolic BP 64     Drug Use No     Rich Scale not on file     Number of administrations of Analgesic Narcotics 2     Peripheral Vascular Assessment WDL     Cardiac Assessment WDL     Days after Admission 0.273     Gastrointestinal Assessment X     Albumin 4.2 g/dL     Creatinine 0.65 mg/dL     ALT 9 U/L     Potassium 4.1 mmol/L     Chloride 105 mmol/L     Total Bilirubin 0.2 mg/dL     Calcium 9.9 mg/dL         Weight:       05/25/23  1933   Weight: 76.3 kg (168 lb 3.4 oz)       Intake and Output    Intake/Output Summary (Last 24 hours) at 5/26/2023 0242  Last data filed at 5/26/2023 0200  Gross per 24 hour   Intake 100 ml   Output --   Net 100 ml       Diet:        Most recent vitals:   Vitals:    05/26/23 0116 05/26/23 0201 05/26/23 0231 05/26/23 0232   BP: 113/72 107/64 114/81    Pulse: 77 76  80   Resp:       Temp:       TempSrc:       SpO2: 95% 95%  96%   Weight:       Height:            Active LDAs/IV Access:   Lines, Drains & Airways       Active LDAs       Name Placement date Placement time Site Days    Peripheral IV 05/25/23 1949 Left Antecubital 05/25/23 1949  Antecubital  less than 1                    Skin Condition:   Skin Assessments (last day)       None             Labs (abnormal labs have a star):   Labs Reviewed   COMPREHENSIVE METABOLIC PANEL - Abnormal; Notable for the following components:       Result Value    Glucose 111 (*)     Alkaline Phosphatase 144 (*)     BUN/Creatinine Ratio 32.3 (*)     All other components within normal limits    Narrative:     GFR Normal >60  Chronic Kidney Disease <60  Kidney Failure <15    The GFR formula is only valid for adults with stable renal function between ages 18 and 70.   LIPASE - Abnormal; Notable for the following components:    Lipase 83 (*)     All other components within normal limits   URINALYSIS W/ MICROSCOPIC IF INDICATED (NO CULTURE) - Abnormal; Notable for the following components:    Blood, UA Small (1+) (*)     Leuk Esterase, UA Moderate (2+) (*)     All other components within normal limits   CBC WITH AUTO DIFFERENTIAL - Abnormal; Notable for the following components:    Lymphocyte % 18.0 (*)     All other components within normal limits   URINALYSIS, MICROSCOPIC ONLY - Abnormal; Notable for the following components:    RBC, UA 13-20 (*)     WBC, UA 31-50 (*)     All other components within normal limits   URINE CULTURE   CBC AND DIFFERENTIAL    Narrative:     The following orders were created for panel order CBC & Differential.  Procedure                               Abnormality         Status                     ---------                               -----------         ------                     CBC Auto Differential[[id number]]        Abnormal            Final result                 Please view results for these tests on the individual orders.       LOC: Person, Place, Time, and  Situation    Telemetry:  Med/Surg    Cardiac Monitoring Ordered: no    EKG:   No orders to display       Medications Given in the ED:   Medications   sodium chloride 0.9 % flush 10 mL (has no administration in time range)   HYDROmorphone (DILAUDID) injection 0.5 mg (0.5 mg Intravenous Given 5/25/23 2013)   ondansetron (ZOFRAN) injection 4 mg (4 mg Intravenous Given 5/25/23 2013)   HYDROmorphone (DILAUDID) injection 0.5 mg (0.5 mg Intravenous Given 5/26/23 0031)   cefTRIAXone (ROCEPHIN) 1 g in sodium chloride 0.9 % 100 mL IVPB (0 g Intravenous Stopped 5/26/23 0200)       Imaging results:  CT Abdomen Pelvis Without Contrast    Result Date: 5/25/2023  1.Bilateral nonobstructing renal stones. 2.Bibasilar opacities may represent atelectasis or infiltrates. 3.5.5 cm right ovarian cystic lesion. 4.Diverticulosis without diverticulitis. Case discussed with clinician LUCY ARGUELLES @ 5/25/2023 10:35 PM EDT. Electronically Signed: Clint Woodard  5/25/2023 10:35 PM EDT  Workstation ID: [id number]    US Pelvis Complete    Result Date: 5/26/2023  1. Cystic mass in the right ovary measuring up to 5.8 cm. This is considered abnormal in a patient of this age. OB/GYN consultation is recommended for follow-up. If this is not performed, follow-up is recommended per guidelines below. 2. No evidence of right ovarian torsion. Management of Ovarian Cyst: Simple Cyst (cystic, no solid component, thin walls) Low Risk (Pre-menopausal) * < 5 cm ? Done, no follow up * 5-7 cm ? Yearly u/s follow up * > 7cm ? MRI or surgery High Risk (post-menopausal, personal of family hx, genetic risk) * < 2cm - Done, no follow up * 2-7cm ? Yearly follow up * > 7cm - MRI or surgery Hemorrhagic Cyst (cystic Low Risk (Pre-menopausal) * < 5 cm ? Done , no f/u needed * > 5 cm ? 6-12 week u/s follow up: - Resolved ? done - Unchanged - MRI High Risk (post-menopausal, personal of family hx, genetic risk) * < 5cm * Early Menopause-6-12 week f/u u/s - Resolved ? done -  Unchanged ? MRI * Late Menopause - MRI or surgery * > 5 cm - MRI or Surgery Any Other Cyst Low Risk (Pre-menopausal) * Thin septation/small calcification ? 6-12 f/u ultrasound - Resolved ? done - Unchanged ? MRI * Multiple thin/thick septations, solid component, ascites, LA - Surgery High Risk (post-menopausal, personal of family hx, genetic risk) * Thin septation/small calcification - MRI vs surgery * Multiple/thick septations, solid w/ flow, ascites, LA - Surgery Management of Asymptomatic Ovarian and Other Adnexal Cysts Imaged at US; Society of Radiologists in Ultrasound Consensus Conference Statement; Ultrasound Quarterly Electronically signed by:  Gregorio Tipton M.D.  5/25/2023 10:29 PM Mountain Time     Social issues:   Social History     Socioeconomic History    Marital status:    Tobacco Use    Smoking status: Never    Smokeless tobacco: Never    Tobacco comments:     NONE   Vaping Use    Vaping Use: Never used   Substance and Sexual Activity    Alcohol use: No    Drug use: No    Sexual activity: Not Currently     Partners: Male     Birth control/protection: None       NIH Stroke Scale:  Interval: (not recorded)  1a. Level of Consciousness: (not recorded)  1b. LOC Questions: (not recorded)  1c. LOC Commands: (not recorded)  2. Best Gaze: (not recorded)  3. Visual: (not recorded)  4. Facial Palsy: (not recorded)  5a. Motor Arm, Left: (not recorded)  5b. Motor Arm, Right: (not recorded)  6a. Motor Leg, Left: (not recorded)  6b. Motor Leg, Right: (not recorded)  7. Limb Ataxia: (not recorded)  8. Sensory: (not recorded)  9. Best Language: (not recorded)  10. Dysarthria: (not recorded)  11. Extinction and Inattention (formerly Neglect): (not recorded)    Total (NIH Stroke Scale): (not recorded)     Additional notable assessment information:     Nursing report ED to floor:  YENNI Romeo RN   05/26/23 02:42 EDT

## 2023-05-27 ENCOUNTER — READMISSION MANAGEMENT (OUTPATIENT)
Dept: CALL CENTER | Facility: HOSPITAL | Age: 73
End: 2023-05-27
Payer: MEDICARE

## 2023-05-27 VITALS
HEART RATE: 73 BPM | HEIGHT: 64 IN | TEMPERATURE: 98 F | OXYGEN SATURATION: 92 % | DIASTOLIC BLOOD PRESSURE: 71 MMHG | BODY MASS INDEX: 28.64 KG/M2 | RESPIRATION RATE: 16 BRPM | WEIGHT: 167.77 LBS | SYSTOLIC BLOOD PRESSURE: 142 MMHG

## 2023-05-27 LAB
ANION GAP SERPL CALCULATED.3IONS-SCNC: 7 MMOL/L (ref 5–15)
BACTERIA SPEC AEROBE CULT: NORMAL
BUN SERPL-MCNC: 21 MG/DL (ref 8–23)
BUN/CREAT SERPL: 29.2 (ref 7–25)
CALCIUM SPEC-SCNC: 9 MG/DL (ref 8.6–10.5)
CHLORIDE SERPL-SCNC: 108 MMOL/L (ref 98–107)
CO2 SERPL-SCNC: 27 MMOL/L (ref 22–29)
CREAT SERPL-MCNC: 0.72 MG/DL (ref 0.57–1)
DEPRECATED RDW RBC AUTO: 48.1 FL (ref 37–54)
EGFRCR SERPLBLD CKD-EPI 2021: 87.9 ML/MIN/1.73
ERYTHROCYTE [DISTWIDTH] IN BLOOD BY AUTOMATED COUNT: 14.2 % (ref 12.3–15.4)
GLUCOSE SERPL-MCNC: 102 MG/DL (ref 65–99)
HCT VFR BLD AUTO: 32.3 % (ref 34–46.6)
HGB BLD-MCNC: 11 G/DL (ref 12–15.9)
MCH RBC QN AUTO: 30.9 PG (ref 26.6–33)
MCHC RBC AUTO-ENTMCNC: 34 G/DL (ref 31.5–35.7)
MCV RBC AUTO: 91 FL (ref 79–97)
PLATELET # BLD AUTO: 222 10*3/MM3 (ref 140–450)
PMV BLD AUTO: 8.1 FL (ref 6–12)
POTASSIUM SERPL-SCNC: 4 MMOL/L (ref 3.5–5.2)
RBC # BLD AUTO: 3.55 10*6/MM3 (ref 3.77–5.28)
SODIUM SERPL-SCNC: 142 MMOL/L (ref 136–145)
WBC NRBC COR # BLD: 6.6 10*3/MM3 (ref 3.4–10.8)

## 2023-05-27 PROCEDURE — 85027 COMPLETE CBC AUTOMATED: CPT | Performed by: STUDENT IN AN ORGANIZED HEALTH CARE EDUCATION/TRAINING PROGRAM

## 2023-05-27 PROCEDURE — 96361 HYDRATE IV INFUSION ADD-ON: CPT

## 2023-05-27 PROCEDURE — 80048 BASIC METABOLIC PNL TOTAL CA: CPT | Performed by: STUDENT IN AN ORGANIZED HEALTH CARE EDUCATION/TRAINING PROGRAM

## 2023-05-27 PROCEDURE — G0378 HOSPITAL OBSERVATION PER HR: HCPCS

## 2023-05-27 RX ORDER — TIZANIDINE 4 MG/1
4 TABLET ORAL EVERY 8 HOURS PRN
Qty: 6 TABLET | Refills: 0 | Status: SHIPPED | OUTPATIENT
Start: 2023-05-27

## 2023-05-27 RX ADMIN — SENNOSIDES AND DOCUSATE SODIUM 2 TABLET: 50; 8.6 TABLET ORAL at 08:22

## 2023-05-27 RX ADMIN — ATENOLOL 50 MG: 50 TABLET ORAL at 08:22

## 2023-05-27 RX ADMIN — SODIUM CHLORIDE, POTASSIUM CHLORIDE, SODIUM LACTATE AND CALCIUM CHLORIDE 75 ML/HR: 600; 310; 30; 20 INJECTION, SOLUTION INTRAVENOUS at 05:45

## 2023-05-27 RX ADMIN — LEVOTHYROXINE SODIUM 112 MCG: 0.11 TABLET ORAL at 05:12

## 2023-05-27 RX ADMIN — BUPROPION HYDROCHLORIDE 150 MG: 150 TABLET, EXTENDED RELEASE ORAL at 08:22

## 2023-05-27 RX ADMIN — ARIPIPRAZOLE 10 MG: 5 TABLET ORAL at 08:22

## 2023-05-27 NOTE — CASE MANAGEMENT/SOCIAL WORK
Continued Stay Note  GIOVANI Jeffery     Patient Name: Shannon Dyson  MRN: [id number]  Today's Date: 5/27/2023    Admit Date: 5/25/2023    Plan: DC Plan: From home with spouse.  Pending SNF choices (would need precert and PASRR) vs [facility] (referral sent) vs [facility] OP PT   Discharge Plan     Row Name 05/27/23 1008       Plan    Plan Comments Verified with patient she wants to return home with referral to [facility], pending acceptance. Discharge summary faxed via Ad-Hoc to [facility].                    Expected Discharge Date and Time     Expected Discharge Date Expected Discharge Time    May 27, 2023             April Carson RN

## 2023-05-27 NOTE — DISCHARGE SUMMARY
[facility] Medicine Services  Discharge Summary    Date of Service: 23  Patient Name: Shannon Dyson  : 1950  MRN: [id number]    Date of Admission: 2023  Discharge Diagnosis: Right flank pain most likley musculoskeletal  Date of Discharge:  23  Primary Care Physician: Ana Paula Braun APRN      Presenting Problem:   Right flank pain [R10.9]  Intractable pain [R52]  Ovarian mass, right [N83.8]    Active and Resolved Hospital Problems:  Active Hospital Problems    Diagnosis POA   • **Right flank pain [R10.9] Yes      Resolved Hospital Problems   No resolved problems to display.         Hospital Course     Hospital Course:  Shannon Dyson is a 72 y.o. female with Hx HTN, Depression kidney stones who presents with Rt flank pain. No fever, chills   UA shows possible UTI.  CT a/p shows bilateral non-obstructing renal stones complicated by bibasilar opacities that may represent atelectasis or infiltrated along with 5.5cm right ovarian cystic lesion.  Pelvic US shows cystic mass in the right ovary measuring up to 5.8cm with no evidence of right ovarian torsion.  She is scheduled for lithotripsy next week. Seen by gynecology who will have patient see her as outpat in a month for repeat US.   was done which is normal  Urine cx -mixed jose per lab.  Rt flank is resolved.  Clinically stable to dc.        DISCHARGE Follow Up Recommendations for labs and diagnostics:       Reasons For Change In Medications and Indications for New Medications:      Day of Discharge     Vital Signs:  Temp:  [98 °F (36.7 °C)-99.2 °F (37.3 °C)] 98 °F (36.7 °C)  Heart Rate:  [60-75] 73  Resp:  [10-18] 16  BP: ()/(48-71) 142/71    Physical Exam:  Physical Exam   Constitutional:       Appearance: She is obese.      Comments: Awake and alert, conversational, concerned about how  is doing in ICU  HENT:      Head: Normocephalic and atraumatic.      Nose: Nose normal. No congestion.       Mouth/Throat:      Pharynx: Oropharynx is clear. No oropharyngeal exudate.   Eyes:      General: No scleral icterus.  Cardiovascular:      Rate and Rhythm: Normal rate and regular rhythm.      Heart sounds: No murmur heard.    No friction rub. No gallop.   Pulmonary:      Effort: No respiratory distress.      Breath sounds: No wheezing or rales.   Abdominal:      General: There is no distension.      Tenderness: There is no tenderness. There is no guarding.   Musculoskeletal:         General: No swelling or deformity.      Cervical back: Normal range of motion. No rigidity.      Right lower leg: No edema.      Left lower leg: No edema.   Skin:     Coloration: Skin is not jaundiced.      Findings: No bruising or lesion.   Neurological:      General: No focal deficit present.      Mental Status: She is awake and alert     Motor: Weakness resolved          Pertinent  and/or Most Recent Results     LAB RESULTS:      Lab 05/27/23  0158 05/26/23  0904 05/25/23 2003   WBC 6.60 9.40 7.50   HEMOGLOBIN 11.0* 12.1 12.4   HEMATOCRIT 32.3* 36.5 37.0   PLATELETS 222 261 272   NEUTROS ABS  --   --  5.40   LYMPHS ABS  --   --  1.40   MONOS ABS  --   --  0.50   EOS ABS  --   --  0.30   MCV 91.0 92.9 92.7         Lab 05/27/23  0158 05/26/23  0904 05/25/23 2003   SODIUM 142 138 143   POTASSIUM 4.0 4.1 4.1   CHLORIDE 108* 102 105   CO2 27.0 25.0 26.0   ANION GAP 7.0 11.0 12.0   BUN 21 16 21   CREATININE 0.72 0.63 0.65   EGFR 87.9 93.2 92.5   GLUCOSE 102* 119* 111*   CALCIUM 9.0 9.6 9.9   TSH  --   --  1.500         Lab 05/26/23  0904 05/25/23 2003   TOTAL PROTEIN  --  7.0   ALBUMIN  --  4.2   GLOBULIN  --  2.8   ALT (SGPT)  --  9   AST (SGOT)  --  17   BILIRUBIN  --  0.2   ALK PHOS  --  144*   LIPASE 36 83*                     Brief Urine Lab Results  (Last result in the past 365 days)      Color   Clarity   Blood   Leuk Est   Nitrite   Protein   CREAT   Urine HCG        05/25/23 2252 Yellow   Clear   Small (1+)   Moderate  (2+)   Negative   Negative               Microbiology Results (last 10 days)     Procedure Component Value - Date/Time    Urine Culture - Urine, Urine, Clean Catch [[id number]]  (Normal) Collected: 05/25/23 4638    Lab Status: Preliminary result Specimen: Urine, Clean Catch Updated: 05/26/23 1127     Urine Culture Culture in progress          CT Abdomen Pelvis Without Contrast    Result Date: 5/25/2023  Impression: 1.Bilateral nonobstructing renal stones. 2.Bibasilar opacities may represent atelectasis or infiltrates. 3.5.5 cm right ovarian cystic lesion. 4.Diverticulosis without diverticulitis. Case discussed with clinician LUCY ARGUELLES @ 5/25/2023 10:35 PM EDT. Electronically Signed: Clint Woodard  5/25/2023 10:35 PM EDT  Workstation ID: [id number]    US Pelvis Complete    Result Date: 5/26/2023  Impression: 1. Cystic mass in the right ovary measuring up to 5.8 cm. This is considered abnormal in a patient of this age. OB/GYN consultation is recommended for follow-up. If this is not performed, follow-up is recommended per guidelines below. 2. No evidence of right ovarian torsion. Management of Ovarian Cyst: Simple Cyst (cystic, no solid component, thin walls) Low Risk (Pre-menopausal) * < 5 cm ? Done, no follow up * 5-7 cm ? Yearly u/s follow up * > 7cm ? MRI or surgery High Risk (post-menopausal, personal of family hx, genetic risk) * < 2cm - Done, no follow up * 2-7cm ? Yearly follow up * > 7cm - MRI or surgery Hemorrhagic Cyst (cystic Low Risk (Pre-menopausal) * < 5 cm ? Done , no f/u needed * > 5 cm ? 6-12 week u/s follow up: - Resolved ? done - Unchanged - MRI High Risk (post-menopausal, personal of family hx, genetic risk) * < 5cm * Early Menopause-6-12 week f/u u/s - Resolved ? done - Unchanged ? MRI * Late Menopause - MRI or surgery * > 5 cm - MRI or Surgery Any Other Cyst Low Risk (Pre-menopausal) * Thin septation/small calcification ? 6-12 f/u ultrasound - Resolved ? done - Unchanged ? MRI * Multiple thin/thick  septations, solid component, ascites, LA - Surgery High Risk (post-menopausal, personal of family hx, genetic risk) * Thin septation/small calcification - MRI vs surgery * Multiple/thick septations, solid w/ flow, ascites, LA - Surgery Management of Asymptomatic Ovarian and Other Adnexal Cysts Imaged at US; Society of Radiologists in Ultrasound Consensus Conference Statement; Ultrasound Quarterly Electronically signed by:  Gregorio Tipton M.D.  5/25/2023 10:29 PM Mountain Time      Results for orders placed during the hospital encounter of 06/25/21    Duplex Venous Lower Extremity - Right    Interpretation Summary  · Normal right lower extremity venous duplex scan.      Results for orders placed during the hospital encounter of 06/25/21    Duplex Venous Lower Extremity - Right    Interpretation Summary  · Normal right lower extremity venous duplex scan.          Labs Pending at Discharge:  Pending Labs     Order Current Status    Blood Culture - Blood, Arm, Right In process    Blood Culture - Blood, Hand, Left In process    Urine Culture - Urine, Urine, Clean Catch Preliminary result          Procedures Performed           Consults:   Consults     Date and Time Order Name Status Description    5/26/2023  3:39 AM Inpatient Obstetrics / Gynecology Consult Completed             Discharge Details        Discharge Medications      New Medications      Instructions Start Date   tiZANidine 4 MG tablet  Commonly known as: ZANAFLEX   4 mg, Oral, Every 8 Hours PRN         Continue These Medications      Instructions Start Date   ARIPiprazole 10 MG tablet  Commonly known as: ABILIFY   10 mg, Oral, Daily      ARIPiprazole 5 MG tablet  Commonly known as: ABILIFY   10 mg, Oral, Daily      atenolol 50 MG tablet  Commonly known as: TENORMIN   TAKE 1 TABLET EVERY DAY      buPROPion  MG 24 hr tablet  Commonly known as: WELLBUTRIN XL   TAKE 1 TABLET EVERY MORNING      celecoxib 100 MG capsule  Commonly known as: CeleBREX   100  mg, Oral, 2 Times Daily PRN      dicyclomine 10 MG capsule  Commonly known as: Bentyl   10 mg, Oral, 4 Times Daily Before Meals & Nightly      donepezil 10 MG tablet  Commonly known as: ARICEPT   10 mg, Oral, Nightly      escitalopram 20 MG tablet  Commonly known as: Lexapro   20 mg, Oral, Daily      gemfibrozil 600 MG tablet  Commonly known as: LOPID   600 mg, Oral, Every 12 Hours, Twice daily before meals      gemfibrozil 600 MG tablet  Commonly known as: LOPID   TAKE 1 TABLET TWICE DAILY BEFORE MEALS      levothyroxine 112 MCG tablet  Commonly known as: SYNTHROID, LEVOTHROID   TAKE 1 TABLET EVERY DAY      losartan 100 MG tablet  Commonly known as: COZAAR   TAKE 1 TABLET EVERY DAY      Vitamin D3 50 MCG (2000 UT) tablet   50 mcg, Oral, Daily             Allergies   Allergen Reactions   • Codeine Nausea And Vomiting   • Penicillin G Unknown (See Comments)   • Amlodipine Swelling         Discharge Disposition:   Home or Self Care    Diet:  Hospital:  Diet Order   Procedures   • Diet: Regular/House Diet; Texture: Regular Texture (IDDSI 7); Fluid Consistency: Thin (IDDSI 0)         Discharge Activity:   Activity Instructions    Resume regular activity as tolerated             CODE STATUS:  Code Status and Medical Interventions:   Ordered at: 05/26/23 0339     Code Status (Patient has no pulse and is not breathing):    CPR (Attempt to Resuscitate)     Medical Interventions (Patient has pulse or is breathing):    Full Support         No future appointments.        Time spent on Discharge including face to face service:  35 minutes    Signature: Electronically signed by Lucila Santana MD, 05/27/23, 10:09 EDT.  [facility] Hospitalist Team

## 2023-05-27 NOTE — OUTREACH NOTE
Prep Survey    Flowsheet Row Responses   [facility] patient discharged from? [facility]   Is LACE score < 7 ? Yes   Eligibility [facility]   Date of Admission 05/25/23   Date of Discharge 05/27/23   Discharge Disposition Home-Health Care [facility]   Discharge diagnosis Right flank pain   Does the patient have one of the following disease processes/diagnoses(primary or secondary)? Other   Does the patient have Home health ordered? Yes   What is the Home health agency?  [facility]   Is there a DME ordered? No   Prep survey completed? Yes          Kasandra AYON - Registered Nurse

## 2023-05-27 NOTE — CASE MANAGEMENT/SOCIAL WORK
Discharge Planning Assessment  [facility]     Patient Name: Shannon Dyson  MRN: [id number]  Today's Date: 5/26/2023    Admit Date: 5/25/2023    Plan: DC Plan: From home with spouse.  Pending SNF choices (would need precert and PASRR[facility] OP [facility]   Discharge Needs Assessment     Row Name 05/26/23 1959       Living Environment    People in Home spouse    Name(s) of People in Home Spouse Chris currently hospitalized.    Current Living Arrangements home    Potentially Unsafe Housing Conditions none    Primary Care Provided by self    Provides Primary Care For no one    Family Caregiver if Needed none    Quality of Family Relationships helpful;involved;supportive    Able to Return to Prior Arrangements yes       Resource/Environmental Concerns    Resource/Environmental Concerns none    Transportation Concerns none       Transition Planning    Patient/Family Anticipates Transition to home    Patient/Family Anticipated Services at Transition none    Transportation Anticipated car, drives self;family or friend will provide       Discharge Needs Assessment    Readmission Within the Last 30 Days no previous admission in last 30 days    Equipment Currently Used at Home none    Concerns to be Addressed discharge planning    Anticipated Changes Related to Illness none    Equipment Needed After Discharge none               Discharge Plan     Row Name 05/26/23 1951       Plan    Plan DC Plan: From home with spouse.  Pending SNF choices (would need precert and PASRR) vs [facility] (referral sent) vs [facility] OP PT    Plan Comments Met with patient at bedside. Lives at home with spouse. Spouse is in [facility] at this time.  Discussed PT recc for SNF.  Patient states she can't go to SNF, she needs to be home to take care of her  when he gets home from the hospital.  She states he has OP PT @ [facility] and they could go together.   Later notified that spouse is  set up for [facility], referral sent.  SNF list given.   Patient states she will have to discuss it with her  and family.  Barriers to discharge: IV abx, IV fluids.  Plan OP lithotripsy next week.  R ovary cyst.  Pain control.              Continued Care and Services - Admitted Since 5/25/2023     Home Medical Care     Service Provider Request Status Selected Services Address Phone Fax Patient Preferred    [facility] Pending - Request Sent N/A [address] [facility] [address] [facility] [place] [address] [phone] --              Expected Discharge Date and Time     Expected Discharge Date Expected Discharge Time    May 29, 2023          Demographic Summary     Row Name 05/26/23 1958       General Information    Admission Type observation    Arrived From emergency department    Required Notices Provided Observation Status Notice    Referral Source admission list    Reason for Consult discharge planning    Preferred Language English       Contact Information    Permission Granted to Share Info With family/designee               Functional Status     Row Name 05/26/23 1959       Functional Status    Usual Activity Tolerance good    Current Activity Tolerance good       Functional Status, IADL    Medications independent    Meal Preparation independent    Housekeeping independent    Laundry independent    Shopping independent       Mental Status    General Appearance WDL WDL       Mental Status Summary    Recent Changes in Mental Status/Cognitive Functioning no changes            Met with patient in room.    Maintained distance greater than six feet and spent less than 15 minutes in the room.    Stephanie Maria RN     Office Phone [phone]  Office Cell [phone]

## 2023-05-27 NOTE — DISCHARGE PLACEMENT REQUEST
"Neida Dyson (72 y.o. Female)     Date of Birth   1950    Social Security Number       Address   [address] [place] [address]  [place] [facility]    Home Phone   [phone]    MRN   [id number]       Gnosticism   [employer]    Marital Status                               Admission Date   23    Admission Type   Emergency    Admitting Provider   Jacky Brito DO    Attending Provider   Lucila Santana MD    Department, Room/Bed   [address] MEDICAL INPATIENT, 246/       Discharge Date       Discharge Disposition   Home or Self Care    Discharge Destination                               Attending Provider: Lucila Santana MD    Allergies: Codeine, Penicillin G, Amlodipine    Isolation: None   Infection: None   Code Status: CPR    Ht: 162.6 cm (64\")   Wt: 76.1 kg (167 lb 12.3 oz)    Admission Cmt: None   Principal Problem: Right flank pain [R10.9]                 Active Insurance as of 2023     Primary Coverage     Payor Plan Insurance Group Employer/Plan Group    HUMANA MEDICARE REPLACEMENT HUMANA MEDICARE REPLACEMENT [id number]     Payor Plan Address Payor Plan Phone Number Payor Plan Fax Number Effective Dates    [address] [address] [phone]  2023 - None Entered    Formerly [facility] [id number]       Subscriber Name Subscriber Birth Date Member ID       NEIDA DYSON 1950 [id number]                 Emergency Contacts      (Rel.) Home Phone Work Phone Mobile Phone    irasema dyson (Spouse) -- -- [phone]    Leonila Padilla (Daughter) -- -- [phone]    Janelle Champion (Grandchild) -- -- [phone]               Physician Progress Notes (last 24 hours)      Lucila Santana MD at 23 [facility]              [facility] Medicine Services   Daily Progress Note    Patient Name: Neida Dyson  : 1950  MRN: [id number]  Primary Care Physician:  Ana Paula Braun APRN  Date of admission: 2023  Date and Time of Service: 23 at " 8:15am      Subjective       Chief Complaint: right flank pain    Patient was seen and examined this am. In chair awake and alert. Tells me about her  in ICU. Starting to have Rt flank pain. Awaiting gynecology eval    ROS   Per HPI      Objective       Vitals:   Temp:  [98 °F (36.7 °C)-99.1 °F (37.3 °C)] 98 °F (36.7 °C)  Heart Rate:  [68-84] 80  Resp:  [16-18] 18  BP: (107-146)/(62-81) 129/62  Flow (L/min):  [2-3] 2    Physical Exam   Constitutional:       Appearance: She is obese.      Comments: Awake and alert, conversational, concerned about how  is doing in ICU  HENT:      Head: Normocephalic and atraumatic.      Nose: Nose normal. No congestion.      Mouth/Throat:      Pharynx: Oropharynx is clear. No oropharyngeal exudate.   Eyes:      General: No scleral icterus.  Cardiovascular:      Rate and Rhythm: Normal rate and regular rhythm.      Heart sounds: No murmur heard.    No friction rub. No gallop.   Pulmonary:      Effort: No respiratory distress.      Breath sounds: No wheezing or rales.   Abdominal:      General: There is no distension.      Tenderness: There is mild Rt flank tenderness. There is no guarding.   Musculoskeletal:         General: No swelling or deformity.      Cervical back: Normal range of motion. No rigidity.      Right lower leg: No edema.      Left lower leg: No edema.   Skin:     Coloration: Skin is not jaundiced.      Findings: No bruising or lesion.   Neurological:      General: No focal deficit present.      Mental Status: She is awake and alert     Motor: Weakness present.      Result Review    Result Review:  I have personally reviewed the results from the time of this admission to 5/26/2023 10:13 EDT and agree with these findings:  [x]  Laboratory  []  Microbiology  [x]  Radiology  []  EKG/Telemetry   []  Cardiology/Vascular   []  Pathology  []  Old records  [x]  Other:  Most notable findings include: UTI  CTAP- bilateral non-obstructing renal stones complicated by  bibasilar opacities that may represent atelectasis or infiltrated along with 5.5cm right ovarian cystic lesion.  Pelvic US shows cystic mass in the right ovary measuring up to 5.8cm with no evidence of right ovarian torsion.        Assessment & Plan      Brief Patient Summary:  Shannon Dyson is a 72 y.o. female with Hx HTN, Depression kidney stones who presents with Rt flank pain. No fever, chills   UA shows possible UTI.  CT a/p shows bilateral non-obstructing renal stones complicated by bibasilar opacities that may represent atelectasis or infiltrated along with 5.5cm right ovarian cystic lesion.  Pelvic US shows cystic mass in the right ovary measuring up to 5.8cm with no evidence of right ovarian torsion.  She is scheduled for lithotripsy next week. Awaiting gyne consult      ARIPiprazole, 10 mg, Oral, Daily  atenolol, 50 mg, Oral, Daily  buPROPion XL, 150 mg, Oral, Daily  [START ON 5/27/2023] cefTRIAXone, 1 g, Intravenous, Q24H  donepezil, 10 mg, Oral, Nightly  levothyroxine, 112 mcg, Oral, Q AM  senna-docusate sodium, 2 tablet, Oral, BID  sodium chloride, 10 mL, Intravenous, Q12H       lactated ringers, 75 mL/hr, Last Rate: 75 mL/hr (05/26/23 0351)         Active Hospital Problems:  Active Hospital Problems    Diagnosis    • **Right flank pain      Plan:   #Right Ovarian Cyst  #Bilateral non-obstructing renal stone    - CT a/p shows bilateral non-obstructing renal stones complicated by bibasilar opacities that may represent atelectasis or infiltrated along with 5.5cm right ovarian cystic lesion.      -Pelvic US shows cystic mass in the right ovary measuring up to 5.8cm with no evidence of right ovarian torsion.    - Awaiting gynecology eval    - As kidney stones non-obstructing, will refrain from urology consult at this time, reportedly has otpt urology appointment for lithotripsy next week Wednesday    - Avoid opioids due to lethargy    - Toradol PRN pain    - Patient without clinical signs of pneumonia,  CT chest from 2018 shows bibasilar atelectasis, suspect this is what showed up on CT a/p    - lipase 85, no evidence of pancreatic inflammation on CT     -resume diet     #UTI    - UA concerning for UTI, could also be inflammation related to kidney stones    - cont rocephin    - Urine culture     #Mild cognitive disorder    - follows with Dr. Seipel    - cont Aricept    - awake and alert    - patient's spouse is hospitalized, reportedly she is unable to care for self, may need placement    - pt/ot     #GERD    - PPI     #Anxiety  #Depression    - cont Abilify    - cont Wellbutrin and Lexapro    - cont Klonopin PRN     #Hypothyroid    - cont synthroid     #HTN    - BP stable hold home losartan    - cont atenolol     #HLD    - resume home Gemfibrozil if brings in, not on formular    DVT prophylaxis:  Mechanical DVT prophylaxis orders are present.    CODE STATUS:    Code Status (Patient has no pulse and is not breathing): CPR (Attempt to Resuscitate)  Medical Interventions (Patient has pulse or is breathing): Full Support      Disposition:  I expect patient to be discharged later today or in am      Electronically signed by Lucila Santana MD, 05/26/23, 10:13 EDT.  [facility] Hospitalist Team             Electronically signed by Lucila Santana MD at 05/26/23 1023          Physical Therapy Notes (last 24 hours)      Prachi Pino, PT at 05/26/23 1339  Version 1 of 1       Goal Outcome Evaluation:  Plan of Care Reviewed With: patient           Outcome Evaluation: Patient is a 73 y/o F who was admitted 5/25/23 with c/o R flank pain. Pt found to have R ovarian cyst, bilateral non obstruction renal stones, and UTI. PMHx: GERD, anxiety, depression, HTN, HLD, hypothyroidism. Pt is scheduled to have lithotripsy procedure next Wed on outpatient basis.  Pt reports she lives in a one story home with one step to enter with . Pt's  is in [facility] and just underwent open heart surgery. Pt reports being independent with ADLs,  IADLs, transfers, mobility, and driving. Pt owns no AD. Pt completed bed mobility with min A x1 for supine to sit and sit to supine.  Required max A x2 to be scooted up in bed this date due to weakness and pain in R sided low back.  Once sitting EOB pt c/o dizziness and headache.  BP taken reading 99/61.  Pt also had c/o getting too hot.  Pt was assisted back to supine and assisted up in bed.  Nursing notified of pt's symptoms.  Pt unable to assist with scooting up in bed due to weakness and low back pain.  BP taken 2 more times (supine position) readin/57 and ~5 minutes later reading 102/50.  Pt presents with deficits in functional mobility, strength, activity tolerance, and slight cognitive deficits present. At this time PT recommends SNF pending her progress in acute setting.  If pt's mobility/symptoms/cognitiion improves, then she could hopefully d/c home at that time with home health.  In current state, based on findings today, SNF recommended.           Electronically signed by Prachi Pino, PT at 23 1339     Prachi Pino PT at 23 1342  Version 1 of 1         Patient Name: Shannon Dyson  : 1950    MRN: [id number]                              Today's Date: 2023       Admit Date: 2023    Visit Dx:     ICD-10-CM ICD-9-CM   1. Right flank pain  R10.9 789.09   2. Ovarian mass, right  N83.8 620.8   3. Intractable pain  R52 780.96     Patient Active Problem List   Diagnosis   • Allergic rhinitis   • ALBA (generalized anxiety disorder)   • Bunion   • Carpal tunnel syndrome   • Degenerative joint disease   • Dependent edema   • Dermatitis   • Elevated antinuclear antibody (COMPA) level   • Encounter for immunization   • Encounter for screening for osteoporosis   • Fibrocystic breast disease   • Gastroesophageal reflux disease   • Hyperlipidemia   • Hypertension   • Hypokalemia   • Hypovitaminosis D   • Hypothyroidism   • Low back pain   • Myalgia   • Neurodermatitis   •  Osteoporosis   • Other long term (current) drug therapy   • Mouth pain   • Elevated serum creatinine   • Mild cognitive disorder   • Loss of appetite   • Pain in gums   • Itching   • Panic disorder with agoraphobia   • Moderate episode of recurrent major depressive disorder   • Pharyngitis   • Sore throat   • Cough   • Nail fungus   • Bilateral posterior capsular opacification   • Corneal endothelial dystrophy   • Dry eyes   • Right flank pain     Past Medical History:   Diagnosis Date   • Allergic As a teenager   • Anxiety    • Arthritis ??   • Breast cyst    • Cataract About 10 years ago   • Depression Dont know   • Fibrocystic breast    • GERD (gastroesophageal reflux disease)    • HL (hearing loss) ??   • Hyperlipidemia    • Hypertension    • Hypothyroidism    • Panic disorder      Past Surgical History:   Procedure Laterality Date   • APPENDECTOMY     • BREAST BIOPSY     • BREAST SURGERY     • COLONOSCOPY  3 years ago   • HYSTERECTOMY        General Information     Row Name 05/26/23 1329          Physical Therapy Time and Intention    Document Type evaluation  -EJ     Mode of Treatment physical therapy  -EJ     Row Name 05/26/23 1329          General Information    Patient Profile Reviewed yes  -EJ     Prior Level of Function independent:;ADL's;all household mobility;community mobility;driving;home management  -EJ     Existing Precautions/Restrictions fall;orthostatic hypotension  -EJ     Barriers to Rehab none identified  -EJ     Row Name 05/26/23 1329          Living Environment    People in Home spouse  -EJ     Name(s) of People in Home Chris (); however, he is currently at hospital also due to heart issues.  -EJ     Row Name 05/26/23 1329          Home Main Entrance    Number of Stairs, Main Entrance one  -EJ     Stair Railings, Main Entrance none  -EJ     Row Name 05/26/23 1329          Stairs Within Home, Primary    Number of Stairs, Within Home, Primary none  -EJ     Row Name 05/26/23 132           Cognition    Orientation Status (Cognition) oriented x 3  -EJ     Row Name 05/26/23 1329          Safety Issues, Functional Mobility    Impairments Affecting Function (Mobility) balance;endurance/activity tolerance;pain;strength  -EJ           User Key  (r) = Recorded By, (t) = Taken By, (c) = Cosigned By    Initials Name Provider Type    Prachi Santana, PT Physical Therapist               Mobility     Row Name 05/26/23 1330          Bed Mobility    Bed Mobility bed mobility (all) activities;supine-sit;sit-supine;scooting/bridging  -EJ     Scooting/Bridging [place] (Bed Mobility) maximum assist (25% patient effort);2 person assist  -EJ     Supine-Sit [place] (Bed Mobility) minimum assist (75% patient effort)  -EJ     Sit-Supine [place] (Bed Mobility) minimum assist (75% patient effort)  -EJ     Assistive Device (Bed Mobility) bed rails;draw sheet  -EJ           User Key  (r) = Recorded By, (t) = Taken By, (c) = Cosigned By    Initials Name Provider Type    Prachi Santana, PT Physical Therapist               Obj/Interventions     Row Name 05/26/23 1330          Range of Motion Comprehensive    General Range of Motion bilateral lower extremity ROM WFL  -EJ     Row Name 05/26/23 1330          Strength Comprehensive (MMT)    Comment, General Manual Muscle Testing (MMT) Assessment BLE strength grossly 4+/5 with exception of R hip flex and abd 3-/5 (painful).  -EJ     Row Name 05/26/23 1330          Balance    Balance Assessment sitting static balance;sitting dynamic balance  -EJ     Static Sitting Balance modified independence  -EJ     Dynamic Sitting Balance standby assist  -EJ     Position, Sitting Balance sitting edge of bed  -EJ     Balance Interventions sitting;static;dynamic;minimal challenge  -EJ     Row Name 05/26/23 1330          Sensory Assessment (Somatosensory)    Sensory Assessment (Somatosensory) other (see comments)  Pt reported diminished sensation during dermatomal testing  (light touch) L4 dermatomal distribution RLE.  -EJ           User Key  (r) = Recorded By, (t) = Taken By, (c) = Cosigned By    Initials Name Provider Type    Prachi Santana, PT Physical Therapist               Goals/Plan     Row Name 05/26/23 1337          Bed Mobility Goal 1 (PT)    Activity/Assistive Device (Bed Mobility Goal 1, PT) bed mobility activities, all  -EJ     [place] Level/Cues Needed (Bed Mobility Goal 1, PT) modified independence  -EJ     Time Frame (Bed Mobility Goal 1, PT) long term goal (LTG);2 weeks  -EJ     Row Name 05/26/23 1337          Transfer Goal 1 (PT)    Activity/Assistive Device (Transfer Goal 1, PT) transfers, all;walker, rolling  -EJ     [place] Level/Cues Needed (Transfer Goal 1, PT) supervision required  -EJ     Time Frame (Transfer Goal 1, PT) long term goal (LTG);2 weeks  -     Row Name 05/26/23 1337          Gait Training Goal 1 (PT)    Activity/Assistive Device (Gait Training Goal 1, PT) gait (walking locomotion);walker, rolling  -EJ     [place] Level (Gait Training Goal 1, PT) standby assist  -EJ     Distance (Gait Training Goal 1, PT) 75  -EJ     Time Frame (Gait Training Goal 1, PT) long term goal (LTG);2 weeks  -     Row Name 05/26/23 1337          Therapy Assessment/Plan (PT)    Planned Therapy Interventions (PT) balance training;bed mobility training;gait training;patient/family education;neuromuscular re-education;strengthening;transfer training  -EJ           User Key  (r) = Recorded By, (t) = Taken By, (c) = Cosigned By    Initials Name Provider Type    Prachi Santana, PT Physical Therapist               Clinical Impression     Row Name 05/26/23 1332          Pain    Pretreatment Pain Rating 2/10  -EJ     Posttreatment Pain Rating 6/10  -EJ     Pain Location - Side/Orientation Right  -EJ     Pain Location - back  -EJ     Pain Intervention(s) Repositioned;Therapeutic presence  -     Row Name 05/26/23 1332          Plan of Care Review    Plan  of Care Reviewed With patient  -EJ     Outcome Evaluation Patient is a 71 y/o F who was admitted 23 with c/o R flank pain. Pt found to have R ovarian cyst, bilateral non obstruction renal stones, and UTI. PMHx: GERD, anxiety, depression, HTN, HLD, hypothyroidism. Pt is scheduled to have lithotripsy procedure next Wed on outpatient basis.  Pt reports she lives in a one story home with one step to enter with . Pt's  is in [facility] and just underwent open heart surgery. Pt reports being independent with ADLs, IADLs, transfers, mobility, and driving. Pt owns no AD. She reports she is having R sided LBP that also refers down her R leg to her calf.  Has been sleeping in recliner in hospital with spouse as he is currently in hospital. Pt completed bed mobility with min A x1 for supine to sit and sit to supine.  Required max A x2 to be scooted up in bed this date due to weakness and pain in R sided low back.  Once sitting EOB pt c/o dizziness and headache.  BP taken reading 99/61.  Pt also had c/o getting too hot.  Pt was assisted back to supine and assisted up in bed.  Nursing notified of pt's symptoms.  Pt unable to assist with scooting up in bed due to weakness and low back pain.  BP taken 2 more times (supine position) readin/57 and ~5 minutes later reading 102/50.  Pt presents with deficits in functional mobility, strength, activity tolerance, and slight cognitive deficits present. At this time PT recommends SNF pending her progress in acute setting.  If pt's mobility/symptoms/cognitiion improves, then she could hopefully d/c home at that time with home health.  In current state, based on findings today, SNF recommended.  -EJ     Row Name 23 0105          Therapy Assessment/Plan (PT)    Criteria for Skilled Interventions Met (PT) yes;skilled treatment is necessary  -EJ     Therapy Frequency (PT) 5 times/wk  -EJ     Predicted Duration of Therapy Intervention (PT) until discharge  -EJ     Row  Name 05/26/23 1332          Positioning and Restraints    Pre-Treatment Position in bed  -EJ     Post Treatment Position bed  -EJ     In Bed notified nsg;exit alarm on;patient within staff view;with family/caregiver;with nsg;encouraged to call for assist  -EJ           User Key  (r) = Recorded By, (t) = Taken By, (c) = Cosigned By    Initials Name Provider Type    Prachi Santana, PT Physical Therapist               Outcome Measures     Row Name 05/26/23 1338 05/26/23 0800       How much help from another person do you currently need...    Turning from your back to your side while in flat bed without using bedrails? 3  -EJ 3  -EH    Moving from lying on back to sitting on the side of a flat bed without bedrails? 3  -EJ 3  -EH    Moving to and from a bed to a chair (including a wheelchair)? 3  -EJ 3  -EH    Standing up from a chair using your arms (e.g., wheelchair, bedside chair)? 3  -EJ 3  -EH    Climbing 3-5 steps with a railing? 2  -EJ 3  -EH    To walk in hospital room? 2  -EJ 3  -EH    AM-PAC 6 Clicks Score (PT) 16  -EJ 18  -EH    Highest level of mobility 5 --> Static standing  -EJ 6 --> Walked 10 steps or more  -EH    Row Name 05/26/23 0325          How much help from another person do you currently need...    Turning from your back to your side while in flat bed without using bedrails? 4  -TS     Moving from lying on back to sitting on the side of a flat bed without bedrails? 3  -TS     Moving to and from a bed to a chair (including a wheelchair)? 2  -TS     Standing up from a chair using your arms (e.g., wheelchair, bedside chair)? 3  -TS     Climbing 3-5 steps with a railing? 2  -TS     To walk in hospital room? 2  -TS     AM-PAC 6 Clicks Score (PT) 16  -TS     Highest level of mobility 5 --> Static standing  -TS     Row Name 05/26/23 1338 05/26/23 1003       Functional Assessment    Outcome Measure Options AM-PAC 6 Clicks Basic Mobility (PT)  -EJ AM-PAC 6 Clicks Daily Activity (OT)  -MK          User  Key  (r) = Recorded By, (t) = Taken By, (c) = Cosigned By    Initials Name Provider Type    EJ Prachi Pino, PT Physical Therapist    Prachi Leger, RN Registered Nurse    Agueda Gore RN Registered Nurse    Dylan Castrejon, OT Occupational Therapist                             Physical Therapy Education     Title: PT OT SLP Therapies (In Progress)     Topic: Physical Therapy (Done)     Point: Mobility training (Done)     Learning Progress Summary           Patient Acceptance, E, VU,NR by  at 5/26/2023 1339                   Point: Body mechanics (Done)     Learning Progress Summary           Patient Acceptance, E, VU,NR by  at 5/26/2023 1339                   Point: Precautions (Done)     Learning Progress Summary           Patient Acceptance, E, VU,NR by  at 5/26/2023 1339                               User Key     Initials Effective Dates Name Provider Type [facility] 06/16/21 -  Prachi Pino, PT Physical Therapist PT              PT Recommendation and Plan  Planned Therapy Interventions (PT): balance training, bed mobility training, gait training, patient/family education, neuromuscular re-education, strengthening, transfer training  Plan of Care Reviewed With: patient  Outcome Evaluation: Patient is a 71 y/o F who was admitted 5/25/23 with c/o R flank pain. Pt found to have R ovarian cyst, bilateral non obstruction renal stones, and UTI. PMHx: GERD, anxiety, depression, HTN, HLD, hypothyroidism. Pt is scheduled to have lithotripsy procedure next Wed on outpatient basis.  Pt reports she lives in a one story home with one step to enter with . Pt's  is in [facility] and just underwent open heart surgery. Pt reports being independent with ADLs, IADLs, transfers, mobility, and driving. Pt owns no AD. She reports she is having R sided LBP that also refers down her R leg to her calf.  Has been sleeping in recliner in hospital with spouse as he is currently in hospital. Pt  completed bed mobility with min A x1 for supine to sit and sit to supine.  Required max A x2 to be scooted up in bed this date due to weakness and pain in R sided low back.  Once sitting EOB pt c/o dizziness and headache.  BP taken reading 99/61.  Pt also had c/o getting too hot.  Pt was assisted back to supine and assisted up in bed.  Nursing notified of pt's symptoms.  Pt unable to assist with scooting up in bed due to weakness and low back pain.  BP taken 2 more times (supine position) readin/57 and ~5 minutes later reading 102/50.  Pt presents with deficits in functional mobility, strength, activity tolerance, and slight cognitive deficits present. At this time PT recommends SNF pending her progress in acute setting.  If pt's mobility/symptoms/cognitiion improves, then she could hopefully d/c home at that time with home health.  In current state, based on findings today, SNF recommended.     Time Calculation:    PT Charges     Row Name 23 1341             Time Calculation    Start Time 1010  -EJ      Stop Time 1036  -EJ      Time Calculation (min) 26 min  -EJ      PT Received On 23  -EJ      PT - Next Appointment 23  -EJ      PT Goal Re-Cert Due Date 23  -EJ         Time Calculation- PT    Total Timed Code Minutes- PT 0 minute(s)  -EJ            User Key  (r) = Recorded By, (t) = Taken By, (c) = Cosigned By    Initials Name Provider Type    EJ Prachi Pino, PT Physical Therapist              Therapy Charges for Today     Code Description Service Date Service Provider Modifiers Qty    [id number]  PT EVAL MOD COMPLEXITY 4 2023 Prachi Pino, PT GP 1          PT G-Codes  Outcome Measure Options: AM-PAC 6 Clicks Basic Mobility (PT)  AM-PAC 6 Clicks Score (PT): 16  AM-PAC 6 Clicks Score (OT): 17  PT Discharge Summary  Anticipated Discharge Disposition (PT): skilled nursing facility    Prachi Pino, ANNA  2023      Electronically signed by Prachi Pino, PT at  05/26/23 1342       Occupational Therapy Notes (last 24 hours)  Notes from 05/26/23 1011 through 05/27/23 1011   No notes exist for this encounter.

## 2023-05-30 ENCOUNTER — TRANSITIONAL CARE MANAGEMENT TELEPHONE ENCOUNTER (OUTPATIENT)
Dept: CALL CENTER | Facility: HOSPITAL | Age: 73
End: 2023-05-30

## 2023-05-30 DIAGNOSIS — D50.9 IRON DEFICIENCY ANEMIA, UNSPECIFIED IRON DEFICIENCY ANEMIA TYPE: Primary | ICD-10-CM

## 2023-05-30 NOTE — CASE MANAGEMENT/SOCIAL WORK
Case Management Discharge Note      Final Note: [facility]         Selected Continued Care - Discharged on 5/27/2023 Admission date: 5/25/2023 - Discharge disposition: Home or Self Care        Home Medical Care Coordination complete.    Service Provider Selected Services Address Phone Fax Patient Preferred    [facility] [address] [place] [address] [phone] --                  Transportation Services  Private: Car    Final Discharge Disposition Code: 06 - home with home health care

## 2023-05-30 NOTE — OUTREACH NOTE
Call Center TCM Note    Flowsheet Row Responses   [facility] patient discharged from? [facility]   Does the patient have one of the following disease processes/diagnoses(primary or secondary)? Other   TCM attempt successful? Yes  [ verbal release]   Call start time 145   Call end time 145   Discharge diagnosis Right flank pain   Meds reviewed with patient/caregiver? Yes   Is the patient having any side effects they believe may be caused by any medication additions or changes? No   Does the patient have all medications ordered at discharge? Yes   Is the patient taking all medications as directed (includes completed medication regime)? Yes   Comments Pt reports she is following up with urology and that she has a different PCP that is not in [facility] provider.   Does the patient have an appointment with their PCP within 7 days of discharge? Other   Nursing Interventions Routed TCM call to PCP office, Patient desires to follow up with specialty only   What is the Home health agency?  [facility]   Has home health visited the patient within 72 hours of discharge? Yes   Psychosocial issues? No   Did the patient receive a copy of their discharge instructions? Yes   What is the patient's perception of their health status since discharge? Same   Is the patient/caregiver able to teach back signs and symptoms related to disease process for when to call PCP? Yes   Is the patient/caregiver able to teach back signs and symptoms related to disease process for when to call 911? Yes   Is the patient/caregiver able to teach back the hierarchy of who to call/visit for symptoms/problems? PCP, Specialist, Home health nurse, Urgent Care, ED, 911 Yes   TCM call completed? Yes   Wrap up additional comments Pt reports branden reveles be following up with urology and her PCP is no longer the same and is not in  group   Call end time 145          Keyanna Neely RN    2023, 14:57 EDT

## 2023-05-31 LAB
BACTERIA SPEC AEROBE CULT: NORMAL
BACTERIA SPEC AEROBE CULT: NORMAL

## 2023-12-01 ENCOUNTER — TRANSCRIBE ORDERS (OUTPATIENT)
Dept: ADMINISTRATIVE | Facility: HOSPITAL | Age: 73
End: 2023-12-01
Payer: MEDICARE

## 2023-12-01 DIAGNOSIS — Z13.6 ENCOUNTER FOR SCREENING FOR VASCULAR DISEASE: Primary | ICD-10-CM

## 2023-12-30 NOTE — TELEPHONE ENCOUNTER
Caller: Shannon Dyson    Relationship to patient: Self    Best call back number: [phone]    Patient is needing: YOU GAVE HER SOME MEDICATION A WHILE BACK AND SHE IS NOT SURE WHAT IT IS USE FOR.  IT IS CALLED dicyclomine (Bentyl) 10 MG capsule.  SHE WOULD LIKE TO KNOW WHAT SHE IS TAKING IT FOR  
SPOKE WITH ALISHA PARMAR  
No

## 2024-01-11 ENCOUNTER — HOSPITAL ENCOUNTER (OUTPATIENT)
Dept: CT IMAGING | Facility: HOSPITAL | Age: 74
Discharge: HOME | End: 2024-01-11

## 2024-01-11 ENCOUNTER — HOSPITAL ENCOUNTER (OUTPATIENT)
Dept: CARDIOLOGY | Facility: HOSPITAL | Age: 74
Discharge: HOME | End: 2024-01-11

## 2024-01-11 DIAGNOSIS — Z13.6 ENCOUNTER FOR SCREENING FOR VASCULAR DISEASE: ICD-10-CM

## 2024-01-11 PROCEDURE — 75571 CT HRT W/O DYE W/CA TEST: CPT

## 2024-01-11 PROCEDURE — 93799 UNLISTED CV SVC/PROCEDURE: CPT

## 2024-01-12 LAB
BH CV VAS SCREENING CAROTID CCA LEFT: 62 CM/SEC
BH CV VAS SCREENING CAROTID CCA RIGHT: 79 CM/SEC
BH CV VAS SCREENING CAROTID ICA LEFT: 85 CM/SEC
BH CV VAS SCREENING CAROTID ICA RIGHT: 77 CM/SEC
BH CV XLRA MEAS - MID AO DIAM: 1.4 CM
BH CV XLRA MEAS - PAD LEFT ABI PT: 1.17
BH CV XLRA MEAS - PAD LEFT ARM: 98 MMHG
BH CV XLRA MEAS - PAD LEFT LEG PT: 121 MMHG
BH CV XLRA MEAS - PAD RIGHT ABI PT: 1.14
BH CV XLRA MEAS - PAD RIGHT ARM: 103 MMHG
BH CV XLRA MEAS - PAD RIGHT LEG PT: 117 MMHG
BH CV XLRA MEAS LEFT DIST CCA EDV: 13.2 CM/SEC
BH CV XLRA MEAS LEFT DIST CCA PSV: 61.9 CM/SEC
BH CV XLRA MEAS LEFT ICA/CCA RATIO: 1.4
BH CV XLRA MEAS LEFT PROX ICA EDV: -28.1 CM/SEC
BH CV XLRA MEAS LEFT PROX ICA PSV: -84.7 CM/SEC
BH CV XLRA MEAS RIGHT DIST CCA EDV: 14.3 CM/SEC
BH CV XLRA MEAS RIGHT DIST CCA PSV: 79.1 CM/SEC
BH CV XLRA MEAS RIGHT ICA/CCA RATIO: 1
BH CV XLRA MEAS RIGHT PROX ICA EDV: -17.7 CM/SEC
BH CV XLRA MEAS RIGHT PROX ICA PSV: -76.7 CM/SEC

## 2024-02-15 RX ORDER — ATENOLOL 50 MG/1
TABLET ORAL
Qty: 90 TABLET | Refills: 3 | OUTPATIENT
Start: 2024-02-15